# Patient Record
Sex: MALE | Race: WHITE | NOT HISPANIC OR LATINO | Employment: OTHER | URBAN - NONMETROPOLITAN AREA
[De-identification: names, ages, dates, MRNs, and addresses within clinical notes are randomized per-mention and may not be internally consistent; named-entity substitution may affect disease eponyms.]

---

## 2022-01-01 ENCOUNTER — APPOINTMENT (OUTPATIENT)
Dept: GENERAL RADIOLOGY | Facility: HOSPITAL | Age: 57
End: 2022-01-01

## 2022-01-01 ENCOUNTER — HOSPITAL ENCOUNTER (INPATIENT)
Facility: HOSPITAL | Age: 57
LOS: 11 days | End: 2022-01-16
Attending: STUDENT IN AN ORGANIZED HEALTH CARE EDUCATION/TRAINING PROGRAM | Admitting: HOSPITALIST

## 2022-01-01 ENCOUNTER — APPOINTMENT (OUTPATIENT)
Dept: CT IMAGING | Facility: HOSPITAL | Age: 57
End: 2022-01-01

## 2022-01-01 ENCOUNTER — APPOINTMENT (OUTPATIENT)
Dept: ULTRASOUND IMAGING | Facility: HOSPITAL | Age: 57
End: 2022-01-01

## 2022-01-01 ENCOUNTER — APPOINTMENT (OUTPATIENT)
Dept: CARDIOLOGY | Facility: HOSPITAL | Age: 57
End: 2022-01-01

## 2022-01-01 VITALS
SYSTOLIC BLOOD PRESSURE: 56 MMHG | WEIGHT: 175 LBS | HEIGHT: 75 IN | TEMPERATURE: 98.3 F | HEART RATE: 71 BPM | BODY MASS INDEX: 21.76 KG/M2 | RESPIRATION RATE: 26 BRPM | OXYGEN SATURATION: 54 % | DIASTOLIC BLOOD PRESSURE: 32 MMHG

## 2022-01-01 DIAGNOSIS — F10.931 ALCOHOL WITHDRAWAL DELIRIUM: ICD-10-CM

## 2022-01-01 DIAGNOSIS — S00.83XA CONTUSION OF FACE, INITIAL ENCOUNTER: Primary | ICD-10-CM

## 2022-01-01 LAB
A-A DO2: 113.3 MMHG (ref 0–300)
A-A DO2: 286.4 MMHG (ref 0–300)
A-A DO2: 32.5 MMHG (ref 0–300)
A-A DO2: 323.7 MMHG (ref 0–300)
A-A DO2: 39.6 MMHG (ref 0–300)
A-A DO2: 52.6 MMHG (ref 0–300)
A-A DO2: 61.1 MMHG (ref 0–300)
A-A DO2: 64.5 MMHG (ref 0–300)
A-A DO2: 69.9 MMHG (ref 0–300)
ALBUMIN SERPL-MCNC: 1.14 G/DL (ref 3.5–5.2)
ALBUMIN SERPL-MCNC: 2.4 G/DL (ref 3.5–5.2)
ALBUMIN SERPL-MCNC: 3.52 G/DL (ref 3.5–5.2)
ALBUMIN/GLOB SERPL: 0.3 G/DL
ALBUMIN/GLOB SERPL: 0.6 G/DL
ALBUMIN/GLOB SERPL: 0.7 G/DL
ALP SERPL-CCNC: 198 U/L (ref 39–117)
ALP SERPL-CCNC: 288 U/L (ref 39–117)
ALP SERPL-CCNC: 599 U/L (ref 39–117)
ALT SERPL W P-5'-P-CCNC: 102 U/L (ref 1–41)
ALT SERPL W P-5'-P-CCNC: 23 U/L (ref 1–41)
ALT SERPL W P-5'-P-CCNC: 66 U/L (ref 1–41)
AMMONIA BLD-SCNC: 28 UMOL/L (ref 16–60)
AMPHET+METHAMPHET UR QL: NEGATIVE
AMPHETAMINES UR QL: NEGATIVE
ANION GAP SERPL CALCULATED.3IONS-SCNC: 10.2 MMOL/L (ref 5–15)
ANION GAP SERPL CALCULATED.3IONS-SCNC: 11.1 MMOL/L (ref 5–15)
ANION GAP SERPL CALCULATED.3IONS-SCNC: 11.3 MMOL/L (ref 5–15)
ANION GAP SERPL CALCULATED.3IONS-SCNC: 11.3 MMOL/L (ref 5–15)
ANION GAP SERPL CALCULATED.3IONS-SCNC: 11.4 MMOL/L (ref 5–15)
ANION GAP SERPL CALCULATED.3IONS-SCNC: 11.6 MMOL/L (ref 5–15)
ANION GAP SERPL CALCULATED.3IONS-SCNC: 11.9 MMOL/L (ref 5–15)
ANION GAP SERPL CALCULATED.3IONS-SCNC: 12.1 MMOL/L (ref 5–15)
ANION GAP SERPL CALCULATED.3IONS-SCNC: 12.1 MMOL/L (ref 5–15)
ANION GAP SERPL CALCULATED.3IONS-SCNC: 12.4 MMOL/L (ref 5–15)
ANION GAP SERPL CALCULATED.3IONS-SCNC: 12.5 MMOL/L (ref 5–15)
ANION GAP SERPL CALCULATED.3IONS-SCNC: 12.6 MMOL/L (ref 5–15)
ANION GAP SERPL CALCULATED.3IONS-SCNC: 12.8 MMOL/L (ref 5–15)
ANION GAP SERPL CALCULATED.3IONS-SCNC: 12.9 MMOL/L (ref 5–15)
ANION GAP SERPL CALCULATED.3IONS-SCNC: 13.3 MMOL/L (ref 5–15)
ANION GAP SERPL CALCULATED.3IONS-SCNC: 13.3 MMOL/L (ref 5–15)
ANION GAP SERPL CALCULATED.3IONS-SCNC: 13.4 MMOL/L (ref 5–15)
ANION GAP SERPL CALCULATED.3IONS-SCNC: 13.6 MMOL/L (ref 5–15)
ANION GAP SERPL CALCULATED.3IONS-SCNC: 13.7 MMOL/L (ref 5–15)
ANION GAP SERPL CALCULATED.3IONS-SCNC: 13.7 MMOL/L (ref 5–15)
ANION GAP SERPL CALCULATED.3IONS-SCNC: 13.8 MMOL/L (ref 5–15)
ANION GAP SERPL CALCULATED.3IONS-SCNC: 14 MMOL/L (ref 5–15)
ANION GAP SERPL CALCULATED.3IONS-SCNC: 14.2 MMOL/L (ref 5–15)
ANION GAP SERPL CALCULATED.3IONS-SCNC: 14.6 MMOL/L (ref 5–15)
ANION GAP SERPL CALCULATED.3IONS-SCNC: 16.6 MMOL/L (ref 5–15)
ANION GAP SERPL CALCULATED.3IONS-SCNC: 22.7 MMOL/L (ref 5–15)
ANION GAP SERPL CALCULATED.3IONS-SCNC: 26.1 MMOL/L (ref 5–15)
ANION GAP SERPL CALCULATED.3IONS-SCNC: 28.7 MMOL/L (ref 5–15)
ANION GAP SERPL CALCULATED.3IONS-SCNC: 33 MMOL/L (ref 5–15)
ANISOCYTOSIS BLD QL: ABNORMAL
APAP SERPL-MCNC: <5 MCG/ML (ref 0–30)
APTT PPP: 28.5 SECONDS (ref 25.5–35.4)
ARTERIAL PATENCY WRIST A: ABNORMAL
ARTERIAL PATENCY WRIST A: POSITIVE
ARTERIAL PATENCY WRIST A: POSITIVE
AST SERPL-CCNC: 147 U/L (ref 1–40)
AST SERPL-CCNC: 48 U/L (ref 1–40)
AST SERPL-CCNC: 85 U/L (ref 1–40)
ATMOSPHERIC PRESS: 725 MMHG
ATMOSPHERIC PRESS: 725 MMHG
ATMOSPHERIC PRESS: 726 MMHG
ATMOSPHERIC PRESS: 728 MMHG
ATMOSPHERIC PRESS: 731 MMHG
ATMOSPHERIC PRESS: 733 MMHG
ATMOSPHERIC PRESS: 734 MMHG
ATMOSPHERIC PRESS: 737 MMHG
ATMOSPHERIC PRESS: 738 MMHG
BACTERIA BLD CULT: ABNORMAL
BACTERIA BLD CULT: ABNORMAL
BACTERIA ID TEST ISLT QL CULT: ABNORMAL
BACTERIA ID TEST ISLT QL CULT: ABNORMAL
BACTERIA SPEC AEROBE CULT: ABNORMAL
BACTERIA SPEC AEROBE CULT: NORMAL
BACTERIA SPEC AEROBE CULT: NORMAL
BACTERIA SPEC RESP CULT: NO GROWTH
BACTERIA UR QL AUTO: ABNORMAL /HPF
BACTERIA UR QL AUTO: ABNORMAL /HPF
BARBITURATES UR QL SCN: NEGATIVE
BASE EXCESS BLDA CALC-SCNC: -0.2 MMOL/L (ref 0–2)
BASE EXCESS BLDA CALC-SCNC: -2.9 MMOL/L (ref 0–2)
BASE EXCESS BLDA CALC-SCNC: -5.9 MMOL/L (ref 0–2)
BASE EXCESS BLDA CALC-SCNC: -6.4 MMOL/L (ref 0–2)
BASE EXCESS BLDA CALC-SCNC: -6.9 MMOL/L (ref 0–2)
BASE EXCESS BLDA CALC-SCNC: -7 MMOL/L (ref 0–2)
BASE EXCESS BLDA CALC-SCNC: -7.2 MMOL/L (ref 0–2)
BASE EXCESS BLDA CALC-SCNC: -8.1 MMOL/L (ref 0–2)
BASE EXCESS BLDA CALC-SCNC: -9.9 MMOL/L (ref 0–2)
BASOPHILS # BLD AUTO: 0.03 10*3/MM3 (ref 0–0.2)
BASOPHILS # BLD AUTO: 0.03 10*3/MM3 (ref 0–0.2)
BASOPHILS # BLD AUTO: 0.05 10*3/MM3 (ref 0–0.2)
BASOPHILS # BLD AUTO: 0.08 10*3/MM3 (ref 0–0.2)
BASOPHILS # BLD AUTO: 0.08 10*3/MM3 (ref 0–0.2)
BASOPHILS # BLD AUTO: 0.1 10*3/MM3 (ref 0–0.2)
BASOPHILS # BLD MANUAL: 0.11 10*3/MM3 (ref 0–0.2)
BASOPHILS # BLD MANUAL: 0.11 10*3/MM3 (ref 0–0.2)
BASOPHILS NFR BLD AUTO: 0.3 % (ref 0–1.5)
BASOPHILS NFR BLD AUTO: 0.3 % (ref 0–1.5)
BASOPHILS NFR BLD AUTO: 0.6 % (ref 0–1.5)
BASOPHILS NFR BLD AUTO: 0.6 % (ref 0–1.5)
BASOPHILS NFR BLD AUTO: 0.7 % (ref 0–1.5)
BASOPHILS NFR BLD AUTO: 0.8 % (ref 0–1.5)
BASOPHILS NFR BLD MANUAL: 1 % (ref 0–1.5)
BASOPHILS NFR BLD MANUAL: 1 % (ref 0–1.5)
BDY SITE: ABNORMAL
BENZODIAZ UR QL SCN: NEGATIVE
BH CV ECHO MEAS - % IVS THICK: 1.3 %
BH CV ECHO MEAS - % LVPW THICK: 6.2 %
BH CV ECHO MEAS - ACS: 1.9 CM
BH CV ECHO MEAS - AO MAX PG: 7.7 MMHG
BH CV ECHO MEAS - AO MEAN PG: 4 MMHG
BH CV ECHO MEAS - AO ROOT AREA (BSA CORRECTED): 1.6
BH CV ECHO MEAS - AO ROOT AREA: 8.6 CM^2
BH CV ECHO MEAS - AO ROOT DIAM: 3.3 CM
BH CV ECHO MEAS - AO V2 MAX: 139 CM/SEC
BH CV ECHO MEAS - AO V2 MEAN: 91.7 CM/SEC
BH CV ECHO MEAS - AO V2 VTI: 28.6 CM
BH CV ECHO MEAS - BSA(HAYCOCK): 2 M^2
BH CV ECHO MEAS - BSA: 2.1 M^2
BH CV ECHO MEAS - BZI_BMI: 21.4 KILOGRAMS/M^2
BH CV ECHO MEAS - BZI_METRIC_HEIGHT: 190.5 CM
BH CV ECHO MEAS - BZI_METRIC_WEIGHT: 77.6 KG
BH CV ECHO MEAS - EDV(CUBED): 76.8 ML
BH CV ECHO MEAS - EDV(MOD-SP4): 66.8 ML
BH CV ECHO MEAS - EDV(TEICH): 80.8 ML
BH CV ECHO MEAS - EF(CUBED): 76.8 %
BH CV ECHO MEAS - EF(MOD-SP4): 64.4 %
BH CV ECHO MEAS - EF(TEICH): 69.3 %
BH CV ECHO MEAS - ESV(CUBED): 17.8 ML
BH CV ECHO MEAS - ESV(MOD-SP4): 23.8 ML
BH CV ECHO MEAS - ESV(TEICH): 24.8 ML
BH CV ECHO MEAS - FS: 38.6 %
BH CV ECHO MEAS - IVS/LVPW: 1
BH CV ECHO MEAS - IVSD: 1.1 CM
BH CV ECHO MEAS - IVSS: 1.2 CM
BH CV ECHO MEAS - LA DIMENSION: 3.5 CM
BH CV ECHO MEAS - LA/AO: 1
BH CV ECHO MEAS - LV DIASTOLIC VOL/BSA (35-75): 32.5 ML/M^2
BH CV ECHO MEAS - LV MASS(C)D: 165.7 GRAMS
BH CV ECHO MEAS - LV MASS(C)DI: 80.7 GRAMS/M^2
BH CV ECHO MEAS - LV MASS(C)S: 86.7 GRAMS
BH CV ECHO MEAS - LV MASS(C)SI: 42.2 GRAMS/M^2
BH CV ECHO MEAS - LV SYSTOLIC VOL/BSA (12-30): 11.6 ML/M^2
BH CV ECHO MEAS - LVIDD: 4.3 CM
BH CV ECHO MEAS - LVIDS: 2.6 CM
BH CV ECHO MEAS - LVLD AP4: 6.2 CM
BH CV ECHO MEAS - LVLS AP4: 5 CM
BH CV ECHO MEAS - LVOT AREA (M): 3.1 CM^2
BH CV ECHO MEAS - LVOT AREA: 3.1 CM^2
BH CV ECHO MEAS - LVOT DIAM: 2 CM
BH CV ECHO MEAS - LVPWD: 1.1 CM
BH CV ECHO MEAS - LVPWS: 1.2 CM
BH CV ECHO MEAS - MV A MAX VEL: 121 CM/SEC
BH CV ECHO MEAS - MV E MAX VEL: 82 CM/SEC
BH CV ECHO MEAS - MV E/A: 0.68
BH CV ECHO MEAS - PA ACC TIME: 0.14 SEC
BH CV ECHO MEAS - PA PR(ACCEL): 17.4 MMHG
BH CV ECHO MEAS - RAP SYSTOLE: 10 MMHG
BH CV ECHO MEAS - RVSP: 42.5 MMHG
BH CV ECHO MEAS - SI(AO): 119.1 ML/M^2
BH CV ECHO MEAS - SI(CUBED): 28.7 ML/M^2
BH CV ECHO MEAS - SI(MOD-SP4): 20.9 ML/M^2
BH CV ECHO MEAS - SI(TEICH): 27.3 ML/M^2
BH CV ECHO MEAS - SV(AO): 244.6 ML
BH CV ECHO MEAS - SV(CUBED): 59 ML
BH CV ECHO MEAS - SV(MOD-SP4): 43 ML
BH CV ECHO MEAS - SV(TEICH): 56 ML
BH CV ECHO MEAS - TR MAX VEL: 285 CM/SEC
BILIRUB SERPL-MCNC: 0.3 MG/DL (ref 0–1.2)
BILIRUB SERPL-MCNC: 0.5 MG/DL (ref 0–1.2)
BILIRUB SERPL-MCNC: 0.8 MG/DL (ref 0–1.2)
BILIRUB UR QL STRIP: NEGATIVE
BILIRUB UR QL STRIP: NEGATIVE
BLOOD CULTURE ID, PCR 3: ABNORMAL
BLOOD CULTURE ID, PCR 4: ABNORMAL
BODY TEMPERATURE: 0 C
BOTTLE TYPE: ABNORMAL
BUN SERPL-MCNC: 105 MG/DL (ref 6–20)
BUN SERPL-MCNC: 119 MG/DL (ref 6–20)
BUN SERPL-MCNC: 22 MG/DL (ref 6–20)
BUN SERPL-MCNC: 25 MG/DL (ref 6–20)
BUN SERPL-MCNC: 27 MG/DL (ref 6–20)
BUN SERPL-MCNC: 29 MG/DL (ref 6–20)
BUN SERPL-MCNC: 30 MG/DL (ref 6–20)
BUN SERPL-MCNC: 33 MG/DL (ref 6–20)
BUN SERPL-MCNC: 34 MG/DL (ref 6–20)
BUN SERPL-MCNC: 37 MG/DL (ref 6–20)
BUN SERPL-MCNC: 40 MG/DL (ref 6–20)
BUN SERPL-MCNC: 42 MG/DL (ref 6–20)
BUN SERPL-MCNC: 47 MG/DL (ref 6–20)
BUN SERPL-MCNC: 50 MG/DL (ref 6–20)
BUN SERPL-MCNC: 54 MG/DL (ref 6–20)
BUN SERPL-MCNC: 55 MG/DL (ref 6–20)
BUN SERPL-MCNC: 59 MG/DL (ref 6–20)
BUN SERPL-MCNC: 65 MG/DL (ref 6–20)
BUN SERPL-MCNC: 67 MG/DL (ref 6–20)
BUN SERPL-MCNC: 70 MG/DL (ref 6–20)
BUN SERPL-MCNC: 73 MG/DL (ref 6–20)
BUN SERPL-MCNC: 78 MG/DL (ref 6–20)
BUN SERPL-MCNC: 80 MG/DL (ref 6–20)
BUN SERPL-MCNC: 83 MG/DL (ref 6–20)
BUN SERPL-MCNC: 83 MG/DL (ref 6–20)
BUN SERPL-MCNC: 89 MG/DL (ref 6–20)
BUN SERPL-MCNC: 91 MG/DL (ref 6–20)
BUN/CREAT SERPL: 10.1 (ref 7–25)
BUN/CREAT SERPL: 10.2 (ref 7–25)
BUN/CREAT SERPL: 10.3 (ref 7–25)
BUN/CREAT SERPL: 10.5 (ref 7–25)
BUN/CREAT SERPL: 10.5 (ref 7–25)
BUN/CREAT SERPL: 10.7 (ref 7–25)
BUN/CREAT SERPL: 11 (ref 7–25)
BUN/CREAT SERPL: 11.5 (ref 7–25)
BUN/CREAT SERPL: 11.6 (ref 7–25)
BUN/CREAT SERPL: 11.8 (ref 7–25)
BUN/CREAT SERPL: 11.8 (ref 7–25)
BUN/CREAT SERPL: 11.9 (ref 7–25)
BUN/CREAT SERPL: 12 (ref 7–25)
BUN/CREAT SERPL: 12.3 (ref 7–25)
BUN/CREAT SERPL: 12.6 (ref 7–25)
BUN/CREAT SERPL: 12.7 (ref 7–25)
BUN/CREAT SERPL: 12.8 (ref 7–25)
BUN/CREAT SERPL: 13.6 (ref 7–25)
BUN/CREAT SERPL: 14.1 (ref 7–25)
BUN/CREAT SERPL: 15.2 (ref 7–25)
BUN/CREAT SERPL: 18 (ref 7–25)
BUN/CREAT SERPL: 20 (ref 7–25)
BUN/CREAT SERPL: 20.9 (ref 7–25)
BUN/CREAT SERPL: 23.7 (ref 7–25)
BUN/CREAT SERPL: 24.1 (ref 7–25)
BUPRENORPHINE SERPL-MCNC: NEGATIVE NG/ML
CALCIUM SPEC-SCNC: 6.8 MG/DL (ref 8.6–10.5)
CALCIUM SPEC-SCNC: 6.9 MG/DL (ref 8.6–10.5)
CALCIUM SPEC-SCNC: 7 MG/DL (ref 8.6–10.5)
CALCIUM SPEC-SCNC: 7.1 MG/DL (ref 8.6–10.5)
CALCIUM SPEC-SCNC: 7.2 MG/DL (ref 8.6–10.5)
CALCIUM SPEC-SCNC: 7.2 MG/DL (ref 8.6–10.5)
CALCIUM SPEC-SCNC: 7.3 MG/DL (ref 8.6–10.5)
CALCIUM SPEC-SCNC: 7.4 MG/DL (ref 8.6–10.5)
CALCIUM SPEC-SCNC: 7.6 MG/DL (ref 8.6–10.5)
CALCIUM SPEC-SCNC: 7.7 MG/DL (ref 8.6–10.5)
CALCIUM SPEC-SCNC: 7.8 MG/DL (ref 8.6–10.5)
CALCIUM SPEC-SCNC: 7.9 MG/DL (ref 8.6–10.5)
CALCIUM SPEC-SCNC: 8 MG/DL (ref 8.6–10.5)
CALCIUM SPEC-SCNC: 8.4 MG/DL (ref 8.6–10.5)
CALCIUM SPEC-SCNC: 8.6 MG/DL (ref 8.6–10.5)
CALCIUM SPEC-SCNC: 8.6 MG/DL (ref 8.6–10.5)
CALCIUM SPEC-SCNC: 9.8 MG/DL (ref 8.6–10.5)
CANNABINOIDS SERPL QL: NEGATIVE
CHLORIDE SERPL-SCNC: 107 MMOL/L (ref 98–107)
CHLORIDE SERPL-SCNC: 112 MMOL/L (ref 98–107)
CHLORIDE SERPL-SCNC: 112 MMOL/L (ref 98–107)
CHLORIDE SERPL-SCNC: 113 MMOL/L (ref 98–107)
CHLORIDE SERPL-SCNC: 114 MMOL/L (ref 98–107)
CHLORIDE SERPL-SCNC: 114 MMOL/L (ref 98–107)
CHLORIDE SERPL-SCNC: 115 MMOL/L (ref 98–107)
CHLORIDE SERPL-SCNC: 115 MMOL/L (ref 98–107)
CHLORIDE SERPL-SCNC: 116 MMOL/L (ref 98–107)
CHLORIDE SERPL-SCNC: 118 MMOL/L (ref 98–107)
CHLORIDE SERPL-SCNC: 119 MMOL/L (ref 98–107)
CHLORIDE SERPL-SCNC: 122 MMOL/L (ref 98–107)
CHLORIDE SERPL-SCNC: 123 MMOL/L (ref 98–107)
CHLORIDE SERPL-SCNC: 123 MMOL/L (ref 98–107)
CHLORIDE SERPL-SCNC: 124 MMOL/L (ref 98–107)
CHLORIDE SERPL-SCNC: 124 MMOL/L (ref 98–107)
CHLORIDE SERPL-SCNC: 125 MMOL/L (ref 98–107)
CHLORIDE SERPL-SCNC: 128 MMOL/L (ref 98–107)
CHLORIDE SERPL-SCNC: 129 MMOL/L (ref 98–107)
CHLORIDE SERPL-SCNC: 130 MMOL/L (ref 98–107)
CHLORIDE SERPL-SCNC: 131 MMOL/L (ref 98–107)
CHLORIDE SERPL-SCNC: 85 MMOL/L (ref 98–107)
CHLORIDE SERPL-SCNC: 96 MMOL/L (ref 98–107)
CK SERPL-CCNC: 1269 U/L (ref 20–200)
CK SERPL-CCNC: 131 U/L (ref 20–200)
CK SERPL-CCNC: 548 U/L (ref 20–200)
CK SERPL-CCNC: 994 U/L (ref 20–200)
CLARITY UR: ABNORMAL
CLARITY UR: CLEAR
CO2 BLDA-SCNC: 15.3 MMOL/L (ref 22–33)
CO2 BLDA-SCNC: 17.5 MMOL/L (ref 22–33)
CO2 BLDA-SCNC: 17.9 MMOL/L (ref 22–33)
CO2 BLDA-SCNC: 18.7 MMOL/L (ref 22–33)
CO2 BLDA-SCNC: 19.2 MMOL/L (ref 22–33)
CO2 BLDA-SCNC: 19.2 MMOL/L (ref 22–33)
CO2 BLDA-SCNC: 19.8 MMOL/L (ref 22–33)
CO2 BLDA-SCNC: 23.3 MMOL/L (ref 22–33)
CO2 BLDA-SCNC: 23.5 MMOL/L (ref 22–33)
CO2 SERPL-SCNC: 12.3 MMOL/L (ref 22–29)
CO2 SERPL-SCNC: 12.3 MMOL/L (ref 22–29)
CO2 SERPL-SCNC: 12.9 MMOL/L (ref 22–29)
CO2 SERPL-SCNC: 13.4 MMOL/L (ref 22–29)
CO2 SERPL-SCNC: 14.3 MMOL/L (ref 22–29)
CO2 SERPL-SCNC: 14.4 MMOL/L (ref 22–29)
CO2 SERPL-SCNC: 14.8 MMOL/L (ref 22–29)
CO2 SERPL-SCNC: 15.5 MMOL/L (ref 22–29)
CO2 SERPL-SCNC: 15.6 MMOL/L (ref 22–29)
CO2 SERPL-SCNC: 15.7 MMOL/L (ref 22–29)
CO2 SERPL-SCNC: 15.7 MMOL/L (ref 22–29)
CO2 SERPL-SCNC: 16 MMOL/L (ref 22–29)
CO2 SERPL-SCNC: 16.1 MMOL/L (ref 22–29)
CO2 SERPL-SCNC: 16.2 MMOL/L (ref 22–29)
CO2 SERPL-SCNC: 16.3 MMOL/L (ref 22–29)
CO2 SERPL-SCNC: 16.4 MMOL/L (ref 22–29)
CO2 SERPL-SCNC: 16.6 MMOL/L (ref 22–29)
CO2 SERPL-SCNC: 16.7 MMOL/L (ref 22–29)
CO2 SERPL-SCNC: 17.4 MMOL/L (ref 22–29)
CO2 SERPL-SCNC: 17.6 MMOL/L (ref 22–29)
CO2 SERPL-SCNC: 17.8 MMOL/L (ref 22–29)
CO2 SERPL-SCNC: 18 MMOL/L (ref 22–29)
CO2 SERPL-SCNC: 18.9 MMOL/L (ref 22–29)
CO2 SERPL-SCNC: 20.1 MMOL/L (ref 22–29)
CO2 SERPL-SCNC: 20.2 MMOL/L (ref 22–29)
CO2 SERPL-SCNC: 20.7 MMOL/L (ref 22–29)
CO2 SERPL-SCNC: 20.9 MMOL/L (ref 22–29)
CO2 SERPL-SCNC: 21.4 MMOL/L (ref 22–29)
CO2 SERPL-SCNC: 22.9 MMOL/L (ref 22–29)
COCAINE UR QL: NEGATIVE
COHGB MFR BLD: 0.9 % (ref 0–5)
COHGB MFR BLD: 1 % (ref 0–5)
COHGB MFR BLD: 1 % (ref 0–5)
COHGB MFR BLD: 1.1 % (ref 0–5)
COHGB MFR BLD: 1.1 % (ref 0–5)
COHGB MFR BLD: 1.2 % (ref 0–5)
COHGB MFR BLD: 1.3 % (ref 0–5)
COLOR UR: YELLOW
COLOR UR: YELLOW
CREAT SERPL-MCNC: 1.14 MG/DL (ref 0.76–1.27)
CREAT SERPL-MCNC: 1.22 MG/DL (ref 0.76–1.27)
CREAT SERPL-MCNC: 1.35 MG/DL (ref 0.76–1.27)
CREAT SERPL-MCNC: 1.37 MG/DL (ref 0.76–1.27)
CREAT SERPL-MCNC: 1.39 MG/DL (ref 0.76–1.27)
CREAT SERPL-MCNC: 1.64 MG/DL (ref 0.76–1.27)
CREAT SERPL-MCNC: 1.92 MG/DL (ref 0.76–1.27)
CREAT SERPL-MCNC: 2.2 MG/DL (ref 0.76–1.27)
CREAT SERPL-MCNC: 2.69 MG/DL (ref 0.76–1.27)
CREAT SERPL-MCNC: 3.01 MG/DL (ref 0.76–1.27)
CREAT SERPL-MCNC: 3.62 MG/DL (ref 0.76–1.27)
CREAT SERPL-MCNC: 4.01 MG/DL (ref 0.76–1.27)
CREAT SERPL-MCNC: 4.41 MG/DL (ref 0.76–1.27)
CREAT SERPL-MCNC: 4.96 MG/DL (ref 0.76–1.27)
CREAT SERPL-MCNC: 5.22 MG/DL (ref 0.76–1.27)
CREAT SERPL-MCNC: 5.37 MG/DL (ref 0.76–1.27)
CREAT SERPL-MCNC: 5.64 MG/DL (ref 0.76–1.27)
CREAT SERPL-MCNC: 5.92 MG/DL (ref 0.76–1.27)
CREAT SERPL-MCNC: 5.92 MG/DL (ref 0.76–1.27)
CREAT SERPL-MCNC: 6.09 MG/DL (ref 0.76–1.27)
CREAT SERPL-MCNC: 6.36 MG/DL (ref 0.76–1.27)
CREAT SERPL-MCNC: 6.7 MG/DL (ref 0.76–1.27)
CREAT SERPL-MCNC: 6.8 MG/DL (ref 0.76–1.27)
CREAT SERPL-MCNC: 7.05 MG/DL (ref 0.76–1.27)
CREAT SERPL-MCNC: 7.23 MG/DL (ref 0.76–1.27)
CREAT SERPL-MCNC: 7.56 MG/DL (ref 0.76–1.27)
CREAT SERPL-MCNC: 7.7 MG/DL (ref 0.76–1.27)
CREAT SERPL-MCNC: 8.3 MG/DL (ref 0.76–1.27)
CREAT SERPL-MCNC: 9.33 MG/DL (ref 0.76–1.27)
CRP SERPL-MCNC: 25.26 MG/DL (ref 0–0.5)
CRP SERPL-MCNC: 44.76 MG/DL (ref 0–0.5)
CRP SERPL-MCNC: 5.3 MG/DL (ref 0–0.5)
CRP SERPL-MCNC: 54.03 MG/DL (ref 0–0.5)
CRP SERPL-MCNC: 55.78 MG/DL (ref 0–0.5)
D-LACTATE SERPL-SCNC: 1.7 MMOL/L (ref 0.5–2)
D-LACTATE SERPL-SCNC: 2.2 MMOL/L (ref 0.5–2)
D-LACTATE SERPL-SCNC: 2.8 MMOL/L (ref 0.5–2)
DEPRECATED RDW RBC AUTO: 47.7 FL (ref 37–54)
DEPRECATED RDW RBC AUTO: 48.4 FL (ref 37–54)
DEPRECATED RDW RBC AUTO: 49.2 FL (ref 37–54)
DEPRECATED RDW RBC AUTO: 51.3 FL (ref 37–54)
DEPRECATED RDW RBC AUTO: 52.1 FL (ref 37–54)
DEPRECATED RDW RBC AUTO: 53.2 FL (ref 37–54)
DEPRECATED RDW RBC AUTO: 55.2 FL (ref 37–54)
DEPRECATED RDW RBC AUTO: 55.3 FL (ref 37–54)
DEPRECATED RDW RBC AUTO: 57.1 FL (ref 37–54)
DEPRECATED RDW RBC AUTO: 58.3 FL (ref 37–54)
DEPRECATED RDW RBC AUTO: 58.9 FL (ref 37–54)
DEPRECATED RDW RBC AUTO: 61.1 FL (ref 37–54)
DEPRECATED RDW RBC AUTO: 62.1 FL (ref 37–54)
DOHLE BODIES: PRESENT
DOHLE BODIES: PRESENT
EOSINOPHIL # BLD AUTO: 0 10*3/MM3 (ref 0–0.4)
EOSINOPHIL # BLD AUTO: 0 10*3/MM3 (ref 0–0.4)
EOSINOPHIL # BLD AUTO: 0.1 10*3/MM3 (ref 0–0.4)
EOSINOPHIL # BLD AUTO: 0.11 10*3/MM3 (ref 0–0.4)
EOSINOPHIL # BLD AUTO: 0.12 10*3/MM3 (ref 0–0.4)
EOSINOPHIL # BLD AUTO: 0.17 10*3/MM3 (ref 0–0.4)
EOSINOPHIL # BLD MANUAL: 0.13 10*3/MM3 (ref 0–0.4)
EOSINOPHIL # BLD MANUAL: 0.17 10*3/MM3 (ref 0–0.4)
EOSINOPHIL # BLD MANUAL: 0.3 10*3/MM3 (ref 0–0.4)
EOSINOPHIL # BLD MANUAL: 0.32 10*3/MM3 (ref 0–0.4)
EOSINOPHIL # BLD MANUAL: 0.34 10*3/MM3 (ref 0–0.4)
EOSINOPHIL # BLD MANUAL: 0.37 10*3/MM3 (ref 0–0.4)
EOSINOPHIL NFR BLD AUTO: 0 % (ref 0.3–6.2)
EOSINOPHIL NFR BLD AUTO: 0 % (ref 0.3–6.2)
EOSINOPHIL NFR BLD AUTO: 0.9 % (ref 0.3–6.2)
EOSINOPHIL NFR BLD AUTO: 0.9 % (ref 0.3–6.2)
EOSINOPHIL NFR BLD AUTO: 1.3 % (ref 0.3–6.2)
EOSINOPHIL NFR BLD AUTO: 1.3 % (ref 0.3–6.2)
EOSINOPHIL NFR BLD MANUAL: 2 % (ref 0.3–6.2)
EOSINOPHIL NFR BLD MANUAL: 2 % (ref 0.3–6.2)
EOSINOPHIL NFR BLD MANUAL: 3 % (ref 0.3–6.2)
EOSINOPHIL NFR BLD MANUAL: 4 % (ref 0.3–6.2)
ERYTHROCYTE [DISTWIDTH] IN BLOOD BY AUTOMATED COUNT: 13.4 % (ref 12.3–15.4)
ERYTHROCYTE [DISTWIDTH] IN BLOOD BY AUTOMATED COUNT: 13.6 % (ref 12.3–15.4)
ERYTHROCYTE [DISTWIDTH] IN BLOOD BY AUTOMATED COUNT: 13.8 % (ref 12.3–15.4)
ERYTHROCYTE [DISTWIDTH] IN BLOOD BY AUTOMATED COUNT: 14.2 % (ref 12.3–15.4)
ERYTHROCYTE [DISTWIDTH] IN BLOOD BY AUTOMATED COUNT: 14.6 % (ref 12.3–15.4)
ERYTHROCYTE [DISTWIDTH] IN BLOOD BY AUTOMATED COUNT: 14.8 % (ref 12.3–15.4)
ERYTHROCYTE [DISTWIDTH] IN BLOOD BY AUTOMATED COUNT: 15.4 % (ref 12.3–15.4)
ERYTHROCYTE [DISTWIDTH] IN BLOOD BY AUTOMATED COUNT: 15.7 % (ref 12.3–15.4)
ERYTHROCYTE [DISTWIDTH] IN BLOOD BY AUTOMATED COUNT: 15.7 % (ref 12.3–15.4)
ERYTHROCYTE [DISTWIDTH] IN BLOOD BY AUTOMATED COUNT: 15.9 % (ref 12.3–15.4)
ERYTHROCYTE [DISTWIDTH] IN BLOOD BY AUTOMATED COUNT: 15.9 % (ref 12.3–15.4)
ERYTHROCYTE [DISTWIDTH] IN BLOOD BY AUTOMATED COUNT: 16.1 % (ref 12.3–15.4)
ERYTHROCYTE [DISTWIDTH] IN BLOOD BY AUTOMATED COUNT: 16.5 % (ref 12.3–15.4)
ETHANOL BLD-MCNC: <10 MG/DL (ref 0–10)
ETHANOL UR QL: <0.01 %
FLUAV SUBTYP SPEC NAA+PROBE: NOT DETECTED
FLUBV RNA ISLT QL NAA+PROBE: NOT DETECTED
GFR SERPL CREATININE-BSD FRML MDRD: 10 ML/MIN/1.73
GFR SERPL CREATININE-BSD FRML MDRD: 11 ML/MIN/1.73
GFR SERPL CREATININE-BSD FRML MDRD: 11 ML/MIN/1.73
GFR SERPL CREATININE-BSD FRML MDRD: 12 ML/MIN/1.73
GFR SERPL CREATININE-BSD FRML MDRD: 14 ML/MIN/1.73
GFR SERPL CREATININE-BSD FRML MDRD: 15 ML/MIN/1.73
GFR SERPL CREATININE-BSD FRML MDRD: 17 ML/MIN/1.73
GFR SERPL CREATININE-BSD FRML MDRD: 22 ML/MIN/1.73
GFR SERPL CREATININE-BSD FRML MDRD: 24 ML/MIN/1.73
GFR SERPL CREATININE-BSD FRML MDRD: 31 ML/MIN/1.73
GFR SERPL CREATININE-BSD FRML MDRD: 36 ML/MIN/1.73
GFR SERPL CREATININE-BSD FRML MDRD: 43 ML/MIN/1.73
GFR SERPL CREATININE-BSD FRML MDRD: 52 ML/MIN/1.73
GFR SERPL CREATININE-BSD FRML MDRD: 53 ML/MIN/1.73
GFR SERPL CREATININE-BSD FRML MDRD: 54 ML/MIN/1.73
GFR SERPL CREATININE-BSD FRML MDRD: 6 ML/MIN/1.73
GFR SERPL CREATININE-BSD FRML MDRD: 61 ML/MIN/1.73
GFR SERPL CREATININE-BSD FRML MDRD: 66 ML/MIN/1.73
GFR SERPL CREATININE-BSD FRML MDRD: 7 ML/MIN/1.73
GFR SERPL CREATININE-BSD FRML MDRD: 8 ML/MIN/1.73
GFR SERPL CREATININE-BSD FRML MDRD: 9 ML/MIN/1.73
GFR SERPL CREATININE-BSD FRML MDRD: 9 ML/MIN/1.73
GFR SERPL CREATININE-BSD FRML MDRD: ABNORMAL ML/MIN/{1.73_M2}
GIANT PLATELETS: ABNORMAL
GLOBULIN UR ELPH-MCNC: 4.3 GM/DL
GLOBULIN UR ELPH-MCNC: 4.5 GM/DL
GLOBULIN UR ELPH-MCNC: 4.9 GM/DL
GLUCOSE BLDC GLUCOMTR-MCNC: 101 MG/DL (ref 70–130)
GLUCOSE BLDC GLUCOMTR-MCNC: 103 MG/DL (ref 70–130)
GLUCOSE BLDC GLUCOMTR-MCNC: 121 MG/DL (ref 70–130)
GLUCOSE BLDC GLUCOMTR-MCNC: 126 MG/DL (ref 70–130)
GLUCOSE BLDC GLUCOMTR-MCNC: 127 MG/DL (ref 70–130)
GLUCOSE BLDC GLUCOMTR-MCNC: 127 MG/DL (ref 70–130)
GLUCOSE BLDC GLUCOMTR-MCNC: 133 MG/DL (ref 70–130)
GLUCOSE BLDC GLUCOMTR-MCNC: 144 MG/DL (ref 70–130)
GLUCOSE BLDC GLUCOMTR-MCNC: 145 MG/DL (ref 70–130)
GLUCOSE BLDC GLUCOMTR-MCNC: 147 MG/DL (ref 70–130)
GLUCOSE BLDC GLUCOMTR-MCNC: 149 MG/DL (ref 70–130)
GLUCOSE BLDC GLUCOMTR-MCNC: 154 MG/DL (ref 70–130)
GLUCOSE BLDC GLUCOMTR-MCNC: 156 MG/DL (ref 70–130)
GLUCOSE BLDC GLUCOMTR-MCNC: 159 MG/DL (ref 70–130)
GLUCOSE BLDC GLUCOMTR-MCNC: 164 MG/DL (ref 70–130)
GLUCOSE BLDC GLUCOMTR-MCNC: 165 MG/DL (ref 70–130)
GLUCOSE BLDC GLUCOMTR-MCNC: 174 MG/DL (ref 70–130)
GLUCOSE BLDC GLUCOMTR-MCNC: 184 MG/DL (ref 70–130)
GLUCOSE BLDC GLUCOMTR-MCNC: 185 MG/DL (ref 70–130)
GLUCOSE BLDC GLUCOMTR-MCNC: 190 MG/DL (ref 70–130)
GLUCOSE BLDC GLUCOMTR-MCNC: 194 MG/DL (ref 70–130)
GLUCOSE BLDC GLUCOMTR-MCNC: 195 MG/DL (ref 70–130)
GLUCOSE BLDC GLUCOMTR-MCNC: 197 MG/DL (ref 70–130)
GLUCOSE BLDC GLUCOMTR-MCNC: 197 MG/DL (ref 70–130)
GLUCOSE BLDC GLUCOMTR-MCNC: 198 MG/DL (ref 70–130)
GLUCOSE BLDC GLUCOMTR-MCNC: 200 MG/DL (ref 70–130)
GLUCOSE BLDC GLUCOMTR-MCNC: 202 MG/DL (ref 70–130)
GLUCOSE BLDC GLUCOMTR-MCNC: 208 MG/DL (ref 70–130)
GLUCOSE BLDC GLUCOMTR-MCNC: 211 MG/DL (ref 70–130)
GLUCOSE BLDC GLUCOMTR-MCNC: 211 MG/DL (ref 70–130)
GLUCOSE BLDC GLUCOMTR-MCNC: 213 MG/DL (ref 70–130)
GLUCOSE BLDC GLUCOMTR-MCNC: 217 MG/DL (ref 70–130)
GLUCOSE BLDC GLUCOMTR-MCNC: 220 MG/DL (ref 70–130)
GLUCOSE BLDC GLUCOMTR-MCNC: 225 MG/DL (ref 70–130)
GLUCOSE BLDC GLUCOMTR-MCNC: 230 MG/DL (ref 70–130)
GLUCOSE BLDC GLUCOMTR-MCNC: 231 MG/DL (ref 70–130)
GLUCOSE BLDC GLUCOMTR-MCNC: 232 MG/DL (ref 70–130)
GLUCOSE BLDC GLUCOMTR-MCNC: 233 MG/DL (ref 70–130)
GLUCOSE BLDC GLUCOMTR-MCNC: 234 MG/DL (ref 70–130)
GLUCOSE BLDC GLUCOMTR-MCNC: 242 MG/DL (ref 70–130)
GLUCOSE BLDC GLUCOMTR-MCNC: 246 MG/DL (ref 70–130)
GLUCOSE BLDC GLUCOMTR-MCNC: 248 MG/DL (ref 70–130)
GLUCOSE BLDC GLUCOMTR-MCNC: 256 MG/DL (ref 70–130)
GLUCOSE BLDC GLUCOMTR-MCNC: 268 MG/DL (ref 70–130)
GLUCOSE BLDC GLUCOMTR-MCNC: 268 MG/DL (ref 70–130)
GLUCOSE BLDC GLUCOMTR-MCNC: 310 MG/DL (ref 70–130)
GLUCOSE BLDC GLUCOMTR-MCNC: 334 MG/DL (ref 70–130)
GLUCOSE SERPL-MCNC: 107 MG/DL (ref 65–99)
GLUCOSE SERPL-MCNC: 109 MG/DL (ref 65–99)
GLUCOSE SERPL-MCNC: 118 MG/DL (ref 65–99)
GLUCOSE SERPL-MCNC: 138 MG/DL (ref 65–99)
GLUCOSE SERPL-MCNC: 138 MG/DL (ref 65–99)
GLUCOSE SERPL-MCNC: 139 MG/DL (ref 65–99)
GLUCOSE SERPL-MCNC: 143 MG/DL (ref 65–99)
GLUCOSE SERPL-MCNC: 148 MG/DL (ref 65–99)
GLUCOSE SERPL-MCNC: 149 MG/DL (ref 65–99)
GLUCOSE SERPL-MCNC: 153 MG/DL (ref 65–99)
GLUCOSE SERPL-MCNC: 163 MG/DL (ref 65–99)
GLUCOSE SERPL-MCNC: 164 MG/DL (ref 65–99)
GLUCOSE SERPL-MCNC: 169 MG/DL (ref 65–99)
GLUCOSE SERPL-MCNC: 193 MG/DL (ref 65–99)
GLUCOSE SERPL-MCNC: 197 MG/DL (ref 65–99)
GLUCOSE SERPL-MCNC: 200 MG/DL (ref 65–99)
GLUCOSE SERPL-MCNC: 202 MG/DL (ref 65–99)
GLUCOSE SERPL-MCNC: 210 MG/DL (ref 65–99)
GLUCOSE SERPL-MCNC: 215 MG/DL (ref 65–99)
GLUCOSE SERPL-MCNC: 221 MG/DL (ref 65–99)
GLUCOSE SERPL-MCNC: 223 MG/DL (ref 65–99)
GLUCOSE SERPL-MCNC: 232 MG/DL (ref 65–99)
GLUCOSE SERPL-MCNC: 241 MG/DL (ref 65–99)
GLUCOSE SERPL-MCNC: 257 MG/DL (ref 65–99)
GLUCOSE SERPL-MCNC: 262 MG/DL (ref 65–99)
GLUCOSE SERPL-MCNC: 265 MG/DL (ref 65–99)
GLUCOSE SERPL-MCNC: 292 MG/DL (ref 65–99)
GLUCOSE SERPL-MCNC: 313 MG/DL (ref 65–99)
GLUCOSE SERPL-MCNC: 337 MG/DL (ref 65–99)
GLUCOSE UR STRIP-MCNC: ABNORMAL MG/DL
GLUCOSE UR STRIP-MCNC: NEGATIVE MG/DL
GRAM STN SPEC: ABNORMAL
GRAM STN SPEC: NORMAL
GRAN CASTS URNS QL MICRO: ABNORMAL /LPF
HAV IGM SERPL QL IA: NORMAL
HBA1C MFR BLD: 7.6 % (ref 4.8–5.6)
HBV CORE IGM SERPL QL IA: NORMAL
HBV SURFACE AG SERPL QL IA: NORMAL
HCO3 BLDA-SCNC: 14.6 MMOL/L (ref 20–26)
HCO3 BLDA-SCNC: 16.7 MMOL/L (ref 20–26)
HCO3 BLDA-SCNC: 16.7 MMOL/L (ref 20–26)
HCO3 BLDA-SCNC: 17.8 MMOL/L (ref 20–26)
HCO3 BLDA-SCNC: 18.1 MMOL/L (ref 20–26)
HCO3 BLDA-SCNC: 18.3 MMOL/L (ref 20–26)
HCO3 BLDA-SCNC: 18.6 MMOL/L (ref 20–26)
HCO3 BLDA-SCNC: 22.1 MMOL/L (ref 20–26)
HCO3 BLDA-SCNC: 22.6 MMOL/L (ref 20–26)
HCT VFR BLD AUTO: 24.4 % (ref 37.5–51)
HCT VFR BLD AUTO: 27.2 % (ref 37.5–51)
HCT VFR BLD AUTO: 28 % (ref 37.5–51)
HCT VFR BLD AUTO: 28.1 % (ref 37.5–51)
HCT VFR BLD AUTO: 28.3 % (ref 37.5–51)
HCT VFR BLD AUTO: 29.2 % (ref 37.5–51)
HCT VFR BLD AUTO: 31 % (ref 37.5–51)
HCT VFR BLD AUTO: 31.4 % (ref 37.5–51)
HCT VFR BLD AUTO: 31.5 % (ref 37.5–51)
HCT VFR BLD AUTO: 32.2 % (ref 37.5–51)
HCT VFR BLD AUTO: 32.8 % (ref 37.5–51)
HCT VFR BLD AUTO: 33.4 % (ref 37.5–51)
HCT VFR BLD AUTO: 38.3 % (ref 37.5–51)
HCT VFR BLD CALC: 25.1 % (ref 38–51)
HCT VFR BLD CALC: 26.8 % (ref 38–51)
HCT VFR BLD CALC: 28.4 % (ref 38–51)
HCT VFR BLD CALC: 28.8 % (ref 38–51)
HCT VFR BLD CALC: 29.9 % (ref 38–51)
HCT VFR BLD CALC: 30.1 % (ref 38–51)
HCT VFR BLD CALC: 31.6 % (ref 38–51)
HCT VFR BLD CALC: 31.9 % (ref 38–51)
HCT VFR BLD CALC: 33.9 % (ref 38–51)
HCV AB SER DONR QL: NORMAL
HGB BLD-MCNC: 10 G/DL (ref 13–17.7)
HGB BLD-MCNC: 10.1 G/DL (ref 13–17.7)
HGB BLD-MCNC: 10.5 G/DL (ref 13–17.7)
HGB BLD-MCNC: 10.6 G/DL (ref 13–17.7)
HGB BLD-MCNC: 10.7 G/DL (ref 13–17.7)
HGB BLD-MCNC: 12.7 G/DL (ref 13–17.7)
HGB BLD-MCNC: 7.8 G/DL (ref 13–17.7)
HGB BLD-MCNC: 8.6 G/DL (ref 13–17.7)
HGB BLD-MCNC: 8.6 G/DL (ref 13–17.7)
HGB BLD-MCNC: 8.8 G/DL (ref 13–17.7)
HGB BLD-MCNC: 8.9 G/DL (ref 13–17.7)
HGB BLD-MCNC: 9 G/DL (ref 13–17.7)
HGB BLD-MCNC: 9.3 G/DL (ref 13–17.7)
HGB BLDA-MCNC: 10.3 G/DL (ref 14–18)
HGB BLDA-MCNC: 10.4 G/DL (ref 14–18)
HGB BLDA-MCNC: 11.1 G/DL (ref 14–18)
HGB BLDA-MCNC: 8.2 G/DL (ref 14–18)
HGB BLDA-MCNC: 8.8 G/DL (ref 14–18)
HGB BLDA-MCNC: 9.3 G/DL (ref 14–18)
HGB BLDA-MCNC: 9.4 G/DL (ref 14–18)
HGB BLDA-MCNC: 9.8 G/DL (ref 14–18)
HGB BLDA-MCNC: 9.8 G/DL (ref 14–18)
HGB UR QL STRIP.AUTO: ABNORMAL
HGB UR QL STRIP.AUTO: ABNORMAL
HYALINE CASTS UR QL AUTO: ABNORMAL /LPF
HYALINE CASTS UR QL AUTO: ABNORMAL /LPF
HYPOCHROMIA BLD QL: ABNORMAL
IMM GRANULOCYTES # BLD AUTO: 0.09 10*3/MM3 (ref 0–0.05)
IMM GRANULOCYTES # BLD AUTO: 0.18 10*3/MM3 (ref 0–0.05)
IMM GRANULOCYTES # BLD AUTO: 0.23 10*3/MM3 (ref 0–0.05)
IMM GRANULOCYTES # BLD AUTO: 0.32 10*3/MM3 (ref 0–0.05)
IMM GRANULOCYTES # BLD AUTO: 0.38 10*3/MM3 (ref 0–0.05)
IMM GRANULOCYTES # BLD AUTO: 0.44 10*3/MM3 (ref 0–0.05)
IMM GRANULOCYTES NFR BLD AUTO: 0.8 % (ref 0–0.5)
IMM GRANULOCYTES NFR BLD AUTO: 2 % (ref 0–0.5)
IMM GRANULOCYTES NFR BLD AUTO: 2.3 % (ref 0–0.5)
IMM GRANULOCYTES NFR BLD AUTO: 2.5 % (ref 0–0.5)
IMM GRANULOCYTES NFR BLD AUTO: 3.2 % (ref 0–0.5)
IMM GRANULOCYTES NFR BLD AUTO: 3.8 % (ref 0–0.5)
INHALED O2 CONCENTRATION: 21 %
INHALED O2 CONCENTRATION: 28 %
INHALED O2 CONCENTRATION: 28 %
INHALED O2 CONCENTRATION: 30 %
INHALED O2 CONCENTRATION: 30 %
INHALED O2 CONCENTRATION: 35 %
INHALED O2 CONCENTRATION: 35 %
INHALED O2 CONCENTRATION: 60 %
INHALED O2 CONCENTRATION: 60 %
INR PPP: 0.96 (ref 0.9–1.1)
ISOLATED FROM: ABNORMAL
KETONES UR QL STRIP: ABNORMAL
KETONES UR QL STRIP: NEGATIVE
LEUKOCYTE ESTERASE UR QL STRIP.AUTO: NEGATIVE
LEUKOCYTE ESTERASE UR QL STRIP.AUTO: NEGATIVE
LIPASE SERPL-CCNC: 30 U/L (ref 13–60)
LV EF 2D ECHO EST: 60 %
LYMPHOCYTES # BLD AUTO: 0.43 10*3/MM3 (ref 0.7–3.1)
LYMPHOCYTES # BLD AUTO: 0.69 10*3/MM3 (ref 0.7–3.1)
LYMPHOCYTES # BLD AUTO: 1.04 10*3/MM3 (ref 0.7–3.1)
LYMPHOCYTES # BLD AUTO: 1.39 10*3/MM3 (ref 0.7–3.1)
LYMPHOCYTES # BLD AUTO: 1.4 10*3/MM3 (ref 0.7–3.1)
LYMPHOCYTES # BLD AUTO: 1.92 10*3/MM3 (ref 0.7–3.1)
LYMPHOCYTES # BLD MANUAL: 0.57 10*3/MM3 (ref 0.7–3.1)
LYMPHOCYTES # BLD MANUAL: 0.66 10*3/MM3 (ref 0.7–3.1)
LYMPHOCYTES # BLD MANUAL: 0.68 10*3/MM3 (ref 0.7–3.1)
LYMPHOCYTES # BLD MANUAL: 1.29 10*3/MM3 (ref 0.7–3.1)
LYMPHOCYTES # BLD MANUAL: 1.31 10*3/MM3 (ref 0.7–3.1)
LYMPHOCYTES # BLD MANUAL: 1.35 10*3/MM3 (ref 0.7–3.1)
LYMPHOCYTES # BLD MANUAL: 1.82 10*3/MM3 (ref 0.7–3.1)
LYMPHOCYTES NFR BLD AUTO: 11.5 % (ref 19.6–45.3)
LYMPHOCYTES NFR BLD AUTO: 11.7 % (ref 19.6–45.3)
LYMPHOCYTES NFR BLD AUTO: 11.9 % (ref 19.6–45.3)
LYMPHOCYTES NFR BLD AUTO: 14.9 % (ref 19.6–45.3)
LYMPHOCYTES NFR BLD AUTO: 4 % (ref 19.6–45.3)
LYMPHOCYTES NFR BLD AUTO: 7 % (ref 19.6–45.3)
LYMPHOCYTES NFR BLD MANUAL: 1 % (ref 5–12)
LYMPHOCYTES NFR BLD MANUAL: 13 % (ref 5–12)
LYMPHOCYTES NFR BLD MANUAL: 13 % (ref 5–12)
LYMPHOCYTES NFR BLD MANUAL: 6 % (ref 5–12)
LYMPHOCYTES NFR BLD MANUAL: 7 % (ref 5–12)
LYMPHOCYTES NFR BLD MANUAL: 7 % (ref 5–12)
LYMPHOCYTES NFR BLD MANUAL: 8 % (ref 5–12)
Lab: ABNORMAL
MACROCYTES BLD QL SMEAR: ABNORMAL
MACROCYTES BLD QL SMEAR: NORMAL
MAGNESIUM SERPL-MCNC: 1.7 MG/DL (ref 1.6–2.6)
MAGNESIUM SERPL-MCNC: 1.8 MG/DL (ref 1.6–2.6)
MAGNESIUM SERPL-MCNC: 1.9 MG/DL (ref 1.6–2.6)
MAGNESIUM SERPL-MCNC: 1.9 MG/DL (ref 1.6–2.6)
MAGNESIUM SERPL-MCNC: 2.1 MG/DL (ref 1.6–2.6)
MAGNESIUM SERPL-MCNC: 2.3 MG/DL (ref 1.6–2.6)
MAGNESIUM SERPL-MCNC: 2.3 MG/DL (ref 1.6–2.6)
MAGNESIUM SERPL-MCNC: 2.4 MG/DL (ref 1.6–2.6)
MAGNESIUM SERPL-MCNC: 2.6 MG/DL (ref 1.6–2.6)
MAGNESIUM SERPL-MCNC: 2.8 MG/DL (ref 1.6–2.6)
MCH RBC QN AUTO: 30.6 PG (ref 26.6–33)
MCH RBC QN AUTO: 30.8 PG (ref 26.6–33)
MCH RBC QN AUTO: 31 PG (ref 26.6–33)
MCH RBC QN AUTO: 31 PG (ref 26.6–33)
MCH RBC QN AUTO: 31.1 PG (ref 26.6–33)
MCH RBC QN AUTO: 31.2 PG (ref 26.6–33)
MCH RBC QN AUTO: 31.2 PG (ref 26.6–33)
MCH RBC QN AUTO: 31.3 PG (ref 26.6–33)
MCH RBC QN AUTO: 31.3 PG (ref 26.6–33)
MCH RBC QN AUTO: 31.4 PG (ref 26.6–33)
MCH RBC QN AUTO: 31.4 PG (ref 26.6–33)
MCH RBC QN AUTO: 31.7 PG (ref 26.6–33)
MCH RBC QN AUTO: 31.8 PG (ref 26.6–33)
MCHC RBC AUTO-ENTMCNC: 30 G/DL (ref 31.5–35.7)
MCHC RBC AUTO-ENTMCNC: 30.6 G/DL (ref 31.5–35.7)
MCHC RBC AUTO-ENTMCNC: 30.8 G/DL (ref 31.5–35.7)
MCHC RBC AUTO-ENTMCNC: 31.4 G/DL (ref 31.5–35.7)
MCHC RBC AUTO-ENTMCNC: 31.4 G/DL (ref 31.5–35.7)
MCHC RBC AUTO-ENTMCNC: 31.6 G/DL (ref 31.5–35.7)
MCHC RBC AUTO-ENTMCNC: 31.8 G/DL (ref 31.5–35.7)
MCHC RBC AUTO-ENTMCNC: 32 G/DL (ref 31.5–35.7)
MCHC RBC AUTO-ENTMCNC: 32.1 G/DL (ref 31.5–35.7)
MCHC RBC AUTO-ENTMCNC: 32.9 G/DL (ref 31.5–35.7)
MCHC RBC AUTO-ENTMCNC: 33.2 G/DL (ref 31.5–35.7)
MCV RBC AUTO: 101.4 FL (ref 79–97)
MCV RBC AUTO: 102.6 FL (ref 79–97)
MCV RBC AUTO: 103.3 FL (ref 79–97)
MCV RBC AUTO: 95 FL (ref 79–97)
MCV RBC AUTO: 95.7 FL (ref 79–97)
MCV RBC AUTO: 96 FL (ref 79–97)
MCV RBC AUTO: 96.8 FL (ref 79–97)
MCV RBC AUTO: 97.3 FL (ref 79–97)
MCV RBC AUTO: 97.5 FL (ref 79–97)
MCV RBC AUTO: 98.7 FL (ref 79–97)
MCV RBC AUTO: 98.7 FL (ref 79–97)
MCV RBC AUTO: 98.9 FL (ref 79–97)
MCV RBC AUTO: 99.3 FL (ref 79–97)
METAMYELOCYTES NFR BLD MANUAL: 1 % (ref 0–0)
METAMYELOCYTES NFR BLD MANUAL: 2 % (ref 0–0)
METAMYELOCYTES NFR BLD MANUAL: 3 % (ref 0–0)
METHADONE UR QL SCN: NEGATIVE
METHGB BLD QL: 0.1 % (ref 0–3)
METHGB BLD QL: 0.2 % (ref 0–3)
METHGB BLD QL: 0.3 % (ref 0–3)
METHGB BLD QL: 0.4 % (ref 0–3)
METHGB BLD QL: 0.5 % (ref 0–3)
METHGB BLD QL: 0.6 % (ref 0–3)
METHGB BLD QL: <-0.1 % (ref 0–3)
MODALITY: ABNORMAL
MONOCYTES # BLD AUTO: 0.7 10*3/MM3 (ref 0.1–0.9)
MONOCYTES # BLD AUTO: 0.71 10*3/MM3 (ref 0.1–0.9)
MONOCYTES # BLD AUTO: 0.71 10*3/MM3 (ref 0.1–0.9)
MONOCYTES # BLD AUTO: 0.78 10*3/MM3 (ref 0.1–0.9)
MONOCYTES # BLD AUTO: 0.84 10*3/MM3 (ref 0.1–0.9)
MONOCYTES # BLD AUTO: 1.04 10*3/MM3 (ref 0.1–0.9)
MONOCYTES # BLD: 0.11 10*3/MM3 (ref 0.1–0.9)
MONOCYTES # BLD: 0.39 10*3/MM3 (ref 0.1–0.9)
MONOCYTES # BLD: 0.65 10*3/MM3 (ref 0.1–0.9)
MONOCYTES # BLD: 0.79 10*3/MM3 (ref 0.1–0.9)
MONOCYTES # BLD: 0.81 10*3/MM3 (ref 0.1–0.9)
MONOCYTES # BLD: 0.82 10*3/MM3 (ref 0.1–0.9)
MONOCYTES # BLD: 1.1 10*3/MM3 (ref 0.1–0.9)
MONOCYTES NFR BLD AUTO: 10.5 % (ref 5–12)
MONOCYTES NFR BLD AUTO: 5.5 % (ref 5–12)
MONOCYTES NFR BLD AUTO: 5.9 % (ref 5–12)
MONOCYTES NFR BLD AUTO: 6.7 % (ref 5–12)
MONOCYTES NFR BLD AUTO: 7.7 % (ref 5–12)
MONOCYTES NFR BLD AUTO: 7.9 % (ref 5–12)
MRSA DNA SPEC QL NAA+PROBE: NEGATIVE
NEUTROPHILS # BLD AUTO: 4.77 10*3/MM3 (ref 1.7–7)
NEUTROPHILS # BLD AUTO: 5.53 10*3/MM3 (ref 1.7–7)
NEUTROPHILS # BLD AUTO: 6.51 10*3/MM3 (ref 1.7–7)
NEUTROPHILS # BLD AUTO: 6.92 10*3/MM3 (ref 1.7–7)
NEUTROPHILS # BLD AUTO: 7.46 10*3/MM3 (ref 1.7–7)
NEUTROPHILS # BLD AUTO: 8.24 10*3/MM3 (ref 1.7–7)
NEUTROPHILS # BLD AUTO: 8.33 10*3/MM3 (ref 1.7–7)
NEUTROPHILS NFR BLD AUTO: 6.98 10*3/MM3 (ref 1.7–7)
NEUTROPHILS NFR BLD AUTO: 7.93 10*3/MM3 (ref 1.7–7)
NEUTROPHILS NFR BLD AUTO: 75.2 % (ref 42.7–76)
NEUTROPHILS NFR BLD AUTO: 76 % (ref 42.7–76)
NEUTROPHILS NFR BLD AUTO: 76.9 % (ref 42.7–76)
NEUTROPHILS NFR BLD AUTO: 77.5 % (ref 42.7–76)
NEUTROPHILS NFR BLD AUTO: 79.9 % (ref 42.7–76)
NEUTROPHILS NFR BLD AUTO: 8.89 10*3/MM3 (ref 1.7–7)
NEUTROPHILS NFR BLD AUTO: 87 % (ref 42.7–76)
NEUTROPHILS NFR BLD AUTO: 9.26 10*3/MM3 (ref 1.7–7)
NEUTROPHILS NFR BLD AUTO: 9.27 10*3/MM3 (ref 1.7–7)
NEUTROPHILS NFR BLD AUTO: 9.68 10*3/MM3 (ref 1.7–7)
NEUTROPHILS NFR BLD MANUAL: 58 % (ref 42.7–76)
NEUTROPHILS NFR BLD MANUAL: 61 % (ref 42.7–76)
NEUTROPHILS NFR BLD MANUAL: 62 % (ref 42.7–76)
NEUTROPHILS NFR BLD MANUAL: 64 % (ref 42.7–76)
NEUTROPHILS NFR BLD MANUAL: 65 % (ref 42.7–76)
NEUTROPHILS NFR BLD MANUAL: 65 % (ref 42.7–76)
NEUTROPHILS NFR BLD MANUAL: 73 % (ref 42.7–76)
NEUTS BAND NFR BLD MANUAL: 10 % (ref 0–5)
NEUTS BAND NFR BLD MANUAL: 11 % (ref 0–5)
NEUTS BAND NFR BLD MANUAL: 12 % (ref 0–5)
NEUTS BAND NFR BLD MANUAL: 13 % (ref 0–5)
NEUTS BAND NFR BLD MANUAL: 15 % (ref 0–5)
NEUTS BAND NFR BLD MANUAL: 19 % (ref 0–5)
NEUTS BAND NFR BLD MANUAL: 9 % (ref 0–5)
NEUTS VAC BLD QL SMEAR: ABNORMAL
NEUTS VAC BLD QL SMEAR: ABNORMAL
NITRITE UR QL STRIP: NEGATIVE
NITRITE UR QL STRIP: NEGATIVE
NOTE: ABNORMAL
NOTIFIED BY: ABNORMAL
NOTIFIED WHO: ABNORMAL
NRBC BLD AUTO-RTO: 0 /100 WBC (ref 0–0.2)
NRBC BLD AUTO-RTO: 0.3 /100 WBC (ref 0–0.2)
NRBC BLD AUTO-RTO: 0.4 /100 WBC (ref 0–0.2)
OPIATES UR QL: NEGATIVE
OSMOLALITY SERPL: 347 MOSM/KG (ref 275–300)
OXYCODONE UR QL SCN: NEGATIVE
OXYHGB MFR BLDV: 89.9 % (ref 94–99)
OXYHGB MFR BLDV: 94.9 % (ref 94–99)
OXYHGB MFR BLDV: 95.1 % (ref 94–99)
OXYHGB MFR BLDV: 96 % (ref 94–99)
OXYHGB MFR BLDV: 96 % (ref 94–99)
OXYHGB MFR BLDV: 96.9 % (ref 94–99)
OXYHGB MFR BLDV: 97.4 % (ref 94–99)
OXYHGB MFR BLDV: 97.6 % (ref 94–99)
OXYHGB MFR BLDV: 98.1 % (ref 94–99)
PCO2 BLDA: 22.1 MM HG (ref 35–45)
PCO2 BLDA: 26 MM HG (ref 35–45)
PCO2 BLDA: 29.6 MM HG (ref 35–45)
PCO2 BLDA: 29.9 MM HG (ref 35–45)
PCO2 BLDA: 30.6 MM HG (ref 35–45)
PCO2 BLDA: 34 MM HG (ref 35–45)
PCO2 BLDA: 37.9 MM HG (ref 35–45)
PCO2 BLDA: 38.4 MM HG (ref 35–45)
PCO2 BLDA: 39.1 MM HG (ref 35–45)
PCO2 TEMP ADJ BLD: ABNORMAL MM[HG]
PCP UR QL SCN: NEGATIVE
PEEP RESPIRATORY: 5 CM[H2O]
PEEP RESPIRATORY: 7.5 CM[H2O]
PEEP RESPIRATORY: 7.5 CM[H2O]
PH BLDA: 7.24 PH UNITS (ref 7.35–7.45)
PH BLDA: 7.3 PH UNITS (ref 7.35–7.45)
PH BLDA: 7.33 PH UNITS (ref 7.35–7.45)
PH BLDA: 7.37 PH UNITS (ref 7.35–7.45)
PH BLDA: 7.38 PH UNITS (ref 7.35–7.45)
PH BLDA: 7.39 PH UNITS (ref 7.35–7.45)
PH BLDA: 7.42 PH UNITS (ref 7.35–7.45)
PH BLDA: 7.43 PH UNITS (ref 7.35–7.45)
PH BLDA: 7.49 PH UNITS (ref 7.35–7.45)
PH UR STRIP.AUTO: 5.5 [PH] (ref 5–8)
PH UR STRIP.AUTO: 5.5 [PH] (ref 5–8)
PH, TEMP CORRECTED: ABNORMAL
PHOSPHATE SERPL-MCNC: 0.7 MG/DL (ref 2.5–4.5)
PHOSPHATE SERPL-MCNC: 1.1 MG/DL (ref 2.5–4.5)
PHOSPHATE SERPL-MCNC: 1.7 MG/DL (ref 2.5–4.5)
PHOSPHATE SERPL-MCNC: 2.9 MG/DL (ref 2.5–4.5)
PHOSPHATE SERPL-MCNC: 3.2 MG/DL (ref 2.5–4.5)
PHOSPHATE SERPL-MCNC: 4.2 MG/DL (ref 2.5–4.5)
PHOSPHATE SERPL-MCNC: 4.6 MG/DL (ref 2.5–4.5)
PHOSPHATE SERPL-MCNC: 4.7 MG/DL (ref 2.5–4.5)
PHOSPHATE SERPL-MCNC: 4.9 MG/DL (ref 2.5–4.5)
PHOSPHATE SERPL-MCNC: 5.1 MG/DL (ref 2.5–4.5)
PHOSPHATE SERPL-MCNC: 5.4 MG/DL (ref 2.5–4.5)
PHOSPHATE SERPL-MCNC: 5.5 MG/DL (ref 2.5–4.5)
PLAT MORPH BLD: NORMAL
PLATELET # BLD AUTO: 108 10*3/MM3 (ref 140–450)
PLATELET # BLD AUTO: 133 10*3/MM3 (ref 140–450)
PLATELET # BLD AUTO: 135 10*3/MM3 (ref 140–450)
PLATELET # BLD AUTO: 148 10*3/MM3 (ref 140–450)
PLATELET # BLD AUTO: 176 10*3/MM3 (ref 140–450)
PLATELET # BLD AUTO: 184 10*3/MM3 (ref 140–450)
PLATELET # BLD AUTO: 224 10*3/MM3 (ref 140–450)
PLATELET # BLD AUTO: 258 10*3/MM3 (ref 140–450)
PLATELET # BLD AUTO: 274 10*3/MM3 (ref 140–450)
PLATELET # BLD AUTO: 82 10*3/MM3 (ref 140–450)
PLATELET # BLD AUTO: 86 10*3/MM3 (ref 140–450)
PLATELET # BLD AUTO: 86 10*3/MM3 (ref 140–450)
PLATELET # BLD AUTO: 91 10*3/MM3 (ref 140–450)
PMV BLD AUTO: 10 FL (ref 6–12)
PMV BLD AUTO: 10.1 FL (ref 6–12)
PMV BLD AUTO: 9.1 FL (ref 6–12)
PMV BLD AUTO: 9.2 FL (ref 6–12)
PMV BLD AUTO: 9.3 FL (ref 6–12)
PMV BLD AUTO: 9.4 FL (ref 6–12)
PMV BLD AUTO: 9.6 FL (ref 6–12)
PMV BLD AUTO: 9.7 FL (ref 6–12)
PMV BLD AUTO: 9.8 FL (ref 6–12)
PMV BLD AUTO: 9.9 FL (ref 6–12)
PMV BLD AUTO: 9.9 FL (ref 6–12)
PO2 BLDA: 108 MM HG (ref 83–108)
PO2 BLDA: 115 MM HG (ref 83–108)
PO2 BLDA: 129 MM HG (ref 83–108)
PO2 BLDA: 134 MM HG (ref 83–108)
PO2 BLDA: 56.6 MM HG (ref 83–108)
PO2 BLDA: 77.9 MM HG (ref 83–108)
PO2 BLDA: 83.2 MM HG (ref 83–108)
PO2 BLDA: 86.3 MM HG (ref 83–108)
PO2 BLDA: 86.7 MM HG (ref 83–108)
PO2 TEMP ADJ BLD: ABNORMAL MM[HG]
POTASSIUM SERPL-SCNC: 2.9 MMOL/L (ref 3.5–5.2)
POTASSIUM SERPL-SCNC: 3.1 MMOL/L (ref 3.5–5.2)
POTASSIUM SERPL-SCNC: 3.1 MMOL/L (ref 3.5–5.2)
POTASSIUM SERPL-SCNC: 3.2 MMOL/L (ref 3.5–5.2)
POTASSIUM SERPL-SCNC: 3.2 MMOL/L (ref 3.5–5.2)
POTASSIUM SERPL-SCNC: 3.3 MMOL/L (ref 3.5–5.2)
POTASSIUM SERPL-SCNC: 3.4 MMOL/L (ref 3.5–5.2)
POTASSIUM SERPL-SCNC: 3.5 MMOL/L (ref 3.5–5.2)
POTASSIUM SERPL-SCNC: 3.5 MMOL/L (ref 3.5–5.2)
POTASSIUM SERPL-SCNC: 3.6 MMOL/L (ref 3.5–5.2)
POTASSIUM SERPL-SCNC: 3.7 MMOL/L (ref 3.5–5.2)
POTASSIUM SERPL-SCNC: 3.7 MMOL/L (ref 3.5–5.2)
POTASSIUM SERPL-SCNC: 3.8 MMOL/L (ref 3.5–5.2)
POTASSIUM SERPL-SCNC: 4.4 MMOL/L (ref 3.5–5.2)
POTASSIUM SERPL-SCNC: 4.5 MMOL/L (ref 3.5–5.2)
POTASSIUM SERPL-SCNC: 4.6 MMOL/L (ref 3.5–5.2)
POTASSIUM SERPL-SCNC: 4.7 MMOL/L (ref 3.5–5.2)
POTASSIUM SERPL-SCNC: 4.7 MMOL/L (ref 3.5–5.2)
POTASSIUM SERPL-SCNC: 4.8 MMOL/L (ref 3.5–5.2)
POTASSIUM SERPL-SCNC: 4.9 MMOL/L (ref 3.5–5.2)
POTASSIUM SERPL-SCNC: 4.9 MMOL/L (ref 3.5–5.2)
POTASSIUM SERPL-SCNC: 5 MMOL/L (ref 3.5–5.2)
POTASSIUM SERPL-SCNC: 5 MMOL/L (ref 3.5–5.2)
PROPOXYPH UR QL: NEGATIVE
PROT SERPL-MCNC: 5.6 G/DL (ref 6–8.5)
PROT SERPL-MCNC: 6.7 G/DL (ref 6–8.5)
PROT SERPL-MCNC: 8.4 G/DL (ref 6–8.5)
PROT UR QL STRIP: ABNORMAL
PROT UR QL STRIP: ABNORMAL
PROTHROMBIN TIME: 13.1 SECONDS (ref 12.8–14.5)
PSV: 10 CMH2O
PSV: 10 CMH2O
QT INTERVAL: 358 MS
QT INTERVAL: 368 MS
QTC INTERVAL: 479 MS
QTC INTERVAL: 517 MS
RBC # BLD AUTO: 2.55 10*6/MM3 (ref 4.14–5.8)
RBC # BLD AUTO: 2.74 10*6/MM3 (ref 4.14–5.8)
RBC # BLD AUTO: 2.79 10*6/MM3 (ref 4.14–5.8)
RBC # BLD AUTO: 2.83 10*6/MM3 (ref 4.14–5.8)
RBC # BLD AUTO: 2.85 10*6/MM3 (ref 4.14–5.8)
RBC # BLD AUTO: 2.88 10*6/MM3 (ref 4.14–5.8)
RBC # BLD AUTO: 3 10*6/MM3 (ref 4.14–5.8)
RBC # BLD AUTO: 3.18 10*6/MM3 (ref 4.14–5.8)
RBC # BLD AUTO: 3.19 10*6/MM3 (ref 4.14–5.8)
RBC # BLD AUTO: 3.37 10*6/MM3 (ref 4.14–5.8)
RBC # BLD AUTO: 3.39 10*6/MM3 (ref 4.14–5.8)
RBC # BLD AUTO: 3.45 10*6/MM3 (ref 4.14–5.8)
RBC # BLD AUTO: 3.99 10*6/MM3 (ref 4.14–5.8)
RBC # UR STRIP: ABNORMAL /HPF
RBC # UR STRIP: ABNORMAL /HPF
RBC MORPH BLD: NORMAL
RBC MORPH BLD: NORMAL
REF LAB TEST METHOD: ABNORMAL
REF LAB TEST METHOD: ABNORMAL
S AUREUS DNA SPEC QL NAA+PROBE: NEGATIVE
SALICYLATES SERPL-MCNC: <0.3 MG/DL
SAO2 % BLDCOA: 91.5 % (ref 94–99)
SAO2 % BLDCOA: 96.4 % (ref 94–99)
SAO2 % BLDCOA: 96.5 % (ref 94–99)
SAO2 % BLDCOA: 96.9 % (ref 94–99)
SAO2 % BLDCOA: 97.4 % (ref 94–99)
SAO2 % BLDCOA: 98.7 % (ref 94–99)
SAO2 % BLDCOA: 98.9 % (ref 94–99)
SAO2 % BLDCOA: 99.2 % (ref 94–99)
SAO2 % BLDCOA: >99.2 % (ref 94–99)
SARS-COV-2 RNA PNL SPEC NAA+PROBE: NOT DETECTED
SCAN SLIDE: NORMAL
SET MECH RESP RATE: 18
SET MECH RESP RATE: 20
SMALL PLATELETS BLD QL SMEAR: ABNORMAL
SMALL PLATELETS BLD QL SMEAR: ABNORMAL
SODIUM SERPL-SCNC: 136 MMOL/L (ref 136–145)
SODIUM SERPL-SCNC: 137 MMOL/L (ref 136–145)
SODIUM SERPL-SCNC: 141 MMOL/L (ref 136–145)
SODIUM SERPL-SCNC: 142 MMOL/L (ref 136–145)
SODIUM SERPL-SCNC: 142 MMOL/L (ref 136–145)
SODIUM SERPL-SCNC: 143 MMOL/L (ref 136–145)
SODIUM SERPL-SCNC: 144 MMOL/L (ref 136–145)
SODIUM SERPL-SCNC: 145 MMOL/L (ref 136–145)
SODIUM SERPL-SCNC: 145 MMOL/L (ref 136–145)
SODIUM SERPL-SCNC: 146 MMOL/L (ref 136–145)
SODIUM SERPL-SCNC: 147 MMOL/L (ref 136–145)
SODIUM SERPL-SCNC: 147 MMOL/L (ref 136–145)
SODIUM SERPL-SCNC: 151 MMOL/L (ref 136–145)
SODIUM SERPL-SCNC: 151 MMOL/L (ref 136–145)
SODIUM SERPL-SCNC: 153 MMOL/L (ref 136–145)
SODIUM SERPL-SCNC: 154 MMOL/L (ref 136–145)
SODIUM SERPL-SCNC: 155 MMOL/L (ref 136–145)
SODIUM SERPL-SCNC: 155 MMOL/L (ref 136–145)
SODIUM SERPL-SCNC: 156 MMOL/L (ref 136–145)
SODIUM SERPL-SCNC: 160 MMOL/L (ref 136–145)
SODIUM SERPL-SCNC: 163 MMOL/L (ref 136–145)
SODIUM SERPL-SCNC: 164 MMOL/L (ref 136–145)
SODIUM SERPL-SCNC: 166 MMOL/L (ref 136–145)
SP GR UR STRIP: 1.01 (ref 1–1.03)
SP GR UR STRIP: 1.02 (ref 1–1.03)
SQUAMOUS #/AREA URNS HPF: ABNORMAL /HPF
SQUAMOUS #/AREA URNS HPF: ABNORMAL /HPF
TOXIC GRANULATION: ABNORMAL
TRICYCLICS UR QL SCN: NEGATIVE
TROPONIN T SERPL-MCNC: <0.01 NG/ML (ref 0–0.03)
UROBILINOGEN UR QL STRIP: ABNORMAL
UROBILINOGEN UR QL STRIP: ABNORMAL
VANCOMYCIN SERPL-MCNC: 20.2 MCG/ML (ref 5–40)
VANCOMYCIN SERPL-MCNC: 20.5 MCG/ML (ref 5–40)
VANCOMYCIN SERPL-MCNC: 20.7 MCG/ML (ref 5–40)
VANCOMYCIN SERPL-MCNC: 21.6 MCG/ML (ref 5–40)
VANCOMYCIN SERPL-MCNC: 22.1 MCG/ML (ref 5–40)
VANCOMYCIN SERPL-MCNC: 27.2 MCG/ML (ref 5–40)
VANCOMYCIN TROUGH SERPL-MCNC: 11.8 MCG/ML (ref 5–20)
VANCOMYCIN TROUGH SERPL-MCNC: 30.7 MCG/ML (ref 5–20)
VARIANT LYMPHS NFR BLD MANUAL: 10 % (ref 19.6–45.3)
VARIANT LYMPHS NFR BLD MANUAL: 12 % (ref 19.6–45.3)
VARIANT LYMPHS NFR BLD MANUAL: 13 % (ref 19.6–45.3)
VARIANT LYMPHS NFR BLD MANUAL: 14 % (ref 19.6–45.3)
VARIANT LYMPHS NFR BLD MANUAL: 17 % (ref 19.6–45.3)
VARIANT LYMPHS NFR BLD MANUAL: 8 % (ref 19.6–45.3)
VARIANT LYMPHS NFR BLD MANUAL: 9 % (ref 19.6–45.3)
VENTILATOR MODE: ABNORMAL
VT ON VENT VENT: 510 ML
VT ON VENT VENT: 550 ML
WBC # UR STRIP: ABNORMAL /HPF
WBC # UR STRIP: ABNORMAL /HPF
WBC NRBC COR # BLD: 10.08 10*3/MM3 (ref 3.4–10.8)
WBC NRBC COR # BLD: 10.65 10*3/MM3 (ref 3.4–10.8)
WBC NRBC COR # BLD: 10.7 10*3/MM3 (ref 3.4–10.8)
WBC NRBC COR # BLD: 11.26 10*3/MM3 (ref 3.4–10.8)
WBC NRBC COR # BLD: 11.68 10*3/MM3 (ref 3.4–10.8)
WBC NRBC COR # BLD: 11.97 10*3/MM3 (ref 3.4–10.8)
WBC NRBC COR # BLD: 12.88 10*3/MM3 (ref 3.4–10.8)
WBC NRBC COR # BLD: 6.28 10*3/MM3 (ref 3.4–10.8)
WBC NRBC COR # BLD: 6.58 10*3/MM3 (ref 3.4–10.8)
WBC NRBC COR # BLD: 8.46 10*3/MM3 (ref 3.4–10.8)
WBC NRBC COR # BLD: 9.07 10*3/MM3 (ref 3.4–10.8)
WBC NRBC COR # BLD: 9.23 10*3/MM3 (ref 3.4–10.8)
WBC NRBC COR # BLD: 9.92 10*3/MM3 (ref 3.4–10.8)

## 2022-01-01 PROCEDURE — 80202 ASSAY OF VANCOMYCIN: CPT | Performed by: INTERNAL MEDICINE

## 2022-01-01 PROCEDURE — 83735 ASSAY OF MAGNESIUM: CPT | Performed by: HOSPITALIST

## 2022-01-01 PROCEDURE — 85025 COMPLETE CBC W/AUTO DIFF WBC: CPT | Performed by: NURSE PRACTITIONER

## 2022-01-01 PROCEDURE — 80048 BASIC METABOLIC PNL TOTAL CA: CPT | Performed by: HOSPITALIST

## 2022-01-01 PROCEDURE — 71045 X-RAY EXAM CHEST 1 VIEW: CPT

## 2022-01-01 PROCEDURE — 99223 1ST HOSP IP/OBS HIGH 75: CPT | Performed by: HOSPITALIST

## 2022-01-01 PROCEDURE — 82805 BLOOD GASES W/O2 SATURATION: CPT

## 2022-01-01 PROCEDURE — 70450 CT HEAD/BRAIN W/O DYE: CPT | Performed by: RADIOLOGY

## 2022-01-01 PROCEDURE — 94799 UNLISTED PULMONARY SVC/PX: CPT

## 2022-01-01 PROCEDURE — 84100 ASSAY OF PHOSPHORUS: CPT | Performed by: HOSPITALIST

## 2022-01-01 PROCEDURE — 87040 BLOOD CULTURE FOR BACTERIA: CPT | Performed by: HOSPITALIST

## 2022-01-01 PROCEDURE — 71045 X-RAY EXAM CHEST 1 VIEW: CPT | Performed by: RADIOLOGY

## 2022-01-01 PROCEDURE — 25010000002 LORAZEPAM PER 2 MG: Performed by: INTERNAL MEDICINE

## 2022-01-01 PROCEDURE — 25010000002 CEFTRIAXONE PER 250 MG: Performed by: NURSE PRACTITIONER

## 2022-01-01 PROCEDURE — 82962 GLUCOSE BLOOD TEST: CPT

## 2022-01-01 PROCEDURE — 25010000002 HYDRALAZINE PER 20 MG: Performed by: INTERNAL MEDICINE

## 2022-01-01 PROCEDURE — 63710000001 INSULIN DETEMIR PER 5 UNITS: Performed by: HOSPITALIST

## 2022-01-01 PROCEDURE — 25010000002 PROPOFOL 10 MG/ML EMULSION: Performed by: HOSPITALIST

## 2022-01-01 PROCEDURE — 99291 CRITICAL CARE FIRST HOUR: CPT | Performed by: INTERNAL MEDICINE

## 2022-01-01 PROCEDURE — 84132 ASSAY OF SERUM POTASSIUM: CPT | Performed by: HOSPITALIST

## 2022-01-01 PROCEDURE — 82375 ASSAY CARBOXYHB QUANT: CPT

## 2022-01-01 PROCEDURE — 82550 ASSAY OF CK (CPK): CPT | Performed by: HOSPITALIST

## 2022-01-01 PROCEDURE — 80048 BASIC METABOLIC PNL TOTAL CA: CPT | Performed by: INTERNAL MEDICINE

## 2022-01-01 PROCEDURE — 93010 ELECTROCARDIOGRAM REPORT: CPT | Performed by: INTERNAL MEDICINE

## 2022-01-01 PROCEDURE — 70450 CT HEAD/BRAIN W/O DYE: CPT

## 2022-01-01 PROCEDURE — 83050 HGB METHEMOGLOBIN QUAN: CPT

## 2022-01-01 PROCEDURE — 25010000002 FENTANYL CITRATE (PF) 1000 MCG/20ML SOLUTION: Performed by: HOSPITALIST

## 2022-01-01 PROCEDURE — 87636 SARSCOV2 & INF A&B AMP PRB: CPT | Performed by: EMERGENCY MEDICINE

## 2022-01-01 PROCEDURE — 85007 BL SMEAR W/DIFF WBC COUNT: CPT | Performed by: HOSPITALIST

## 2022-01-01 PROCEDURE — 0BC38ZZ EXTIRPATION OF MATTER FROM RIGHT MAIN BRONCHUS, VIA NATURAL OR ARTIFICIAL OPENING ENDOSCOPIC: ICD-10-PCS | Performed by: INTERNAL MEDICINE

## 2022-01-01 PROCEDURE — 82077 ASSAY SPEC XCP UR&BREATH IA: CPT | Performed by: NURSE PRACTITIONER

## 2022-01-01 PROCEDURE — 25010000002 VANCOMYCIN 5 G RECONSTITUTED SOLUTION: Performed by: HOSPITALIST

## 2022-01-01 PROCEDURE — 87150 DNA/RNA AMPLIFIED PROBE: CPT | Performed by: NURSE PRACTITIONER

## 2022-01-01 PROCEDURE — 87077 CULTURE AEROBIC IDENTIFY: CPT | Performed by: NURSE PRACTITIONER

## 2022-01-01 PROCEDURE — 25010000002 CEFTRIAXONE PER 250 MG: Performed by: PHYSICIAN ASSISTANT

## 2022-01-01 PROCEDURE — 85025 COMPLETE CBC W/AUTO DIFF WBC: CPT | Performed by: HOSPITALIST

## 2022-01-01 PROCEDURE — 87205 SMEAR GRAM STAIN: CPT | Performed by: INTERNAL MEDICINE

## 2022-01-01 PROCEDURE — 25010000002 ENOXAPARIN PER 10 MG: Performed by: HOSPITALIST

## 2022-01-01 PROCEDURE — 25010000002 CEFTRIAXONE PER 250 MG: Performed by: INTERNAL MEDICINE

## 2022-01-01 PROCEDURE — 99233 SBSQ HOSP IP/OBS HIGH 50: CPT | Performed by: INTERNAL MEDICINE

## 2022-01-01 PROCEDURE — 25010000002 THIAMINE PER 100 MG: Performed by: HOSPITALIST

## 2022-01-01 PROCEDURE — 99291 CRITICAL CARE FIRST HOUR: CPT | Performed by: HOSPITALIST

## 2022-01-01 PROCEDURE — 36600 WITHDRAWAL OF ARTERIAL BLOOD: CPT

## 2022-01-01 PROCEDURE — 25010000002 PIPERACILLIN SOD-TAZOBACTAM PER 1 G: Performed by: HOSPITALIST

## 2022-01-01 PROCEDURE — 94002 VENT MGMT INPAT INIT DAY: CPT

## 2022-01-01 PROCEDURE — 80306 DRUG TEST PRSMV INSTRMNT: CPT | Performed by: NURSE PRACTITIONER

## 2022-01-01 PROCEDURE — 87040 BLOOD CULTURE FOR BACTERIA: CPT | Performed by: NURSE PRACTITIONER

## 2022-01-01 PROCEDURE — 63710000001 INSULIN ASPART PER 5 UNITS: Performed by: HOSPITALIST

## 2022-01-01 PROCEDURE — 94003 VENT MGMT INPAT SUBQ DAY: CPT

## 2022-01-01 PROCEDURE — 25010000002 THIAMINE PER 100 MG: Performed by: NURSE PRACTITIONER

## 2022-01-01 PROCEDURE — 25010000002 ENOXAPARIN PER 10 MG: Performed by: INTERNAL MEDICINE

## 2022-01-01 PROCEDURE — 31645 BRNCHSC W/THER ASPIR 1ST: CPT | Performed by: INTERNAL MEDICINE

## 2022-01-01 PROCEDURE — 80053 COMPREHEN METABOLIC PANEL: CPT | Performed by: HOSPITALIST

## 2022-01-01 PROCEDURE — 5A12012 PERFORMANCE OF CARDIAC OUTPUT, SINGLE, MANUAL: ICD-10-PCS | Performed by: SURGERY

## 2022-01-01 PROCEDURE — 76775 US EXAM ABDO BACK WALL LIM: CPT | Performed by: RADIOLOGY

## 2022-01-01 PROCEDURE — 86140 C-REACTIVE PROTEIN: CPT | Performed by: HOSPITALIST

## 2022-01-01 PROCEDURE — 83605 ASSAY OF LACTIC ACID: CPT | Performed by: PHYSICIAN ASSISTANT

## 2022-01-01 PROCEDURE — 93306 TTE W/DOPPLER COMPLETE: CPT | Performed by: INTERNAL MEDICINE

## 2022-01-01 PROCEDURE — 87186 SC STD MICRODIL/AGAR DIL: CPT | Performed by: NURSE PRACTITIONER

## 2022-01-01 PROCEDURE — 80202 ASSAY OF VANCOMYCIN: CPT | Performed by: HOSPITALIST

## 2022-01-01 PROCEDURE — 80179 DRUG ASSAY SALICYLATE: CPT | Performed by: NURSE PRACTITIONER

## 2022-01-01 PROCEDURE — 0 MAGNESIUM SULFATE 4 GM/100ML SOLUTION: Performed by: HOSPITALIST

## 2022-01-01 PROCEDURE — 83036 HEMOGLOBIN GLYCOSYLATED A1C: CPT | Performed by: HOSPITALIST

## 2022-01-01 PROCEDURE — 76775 US EXAM ABDO BACK WALL LIM: CPT

## 2022-01-01 PROCEDURE — 25010000002 LORAZEPAM PER 2 MG: Performed by: HOSPITALIST

## 2022-01-01 PROCEDURE — 93005 ELECTROCARDIOGRAM TRACING: CPT | Performed by: HOSPITALIST

## 2022-01-01 PROCEDURE — 85730 THROMBOPLASTIN TIME PARTIAL: CPT | Performed by: NURSE PRACTITIONER

## 2022-01-01 PROCEDURE — 74176 CT ABD & PELVIS W/O CONTRAST: CPT

## 2022-01-01 PROCEDURE — 25010000002 POTASSIUM CHLORIDE PER 2 MEQ OF POTASSIUM: Performed by: HOSPITALIST

## 2022-01-01 PROCEDURE — 86140 C-REACTIVE PROTEIN: CPT | Performed by: NURSE PRACTITIONER

## 2022-01-01 PROCEDURE — 99233 SBSQ HOSP IP/OBS HIGH 50: CPT | Performed by: HOSPITALIST

## 2022-01-01 PROCEDURE — 85610 PROTHROMBIN TIME: CPT | Performed by: NURSE PRACTITIONER

## 2022-01-01 PROCEDURE — 25010000002 LORAZEPAM PER 2 MG: Performed by: EMERGENCY MEDICINE

## 2022-01-01 PROCEDURE — 36556 INSERT NON-TUNNEL CV CATH: CPT | Performed by: SURGERY

## 2022-01-01 PROCEDURE — 99239 HOSP IP/OBS DSCHRG MGMT >30: CPT | Performed by: INTERNAL MEDICINE

## 2022-01-01 PROCEDURE — 83930 ASSAY OF BLOOD OSMOLALITY: CPT | Performed by: INTERNAL MEDICINE

## 2022-01-01 PROCEDURE — 93306 TTE W/DOPPLER COMPLETE: CPT

## 2022-01-01 PROCEDURE — 83735 ASSAY OF MAGNESIUM: CPT | Performed by: NURSE PRACTITIONER

## 2022-01-01 PROCEDURE — 82140 ASSAY OF AMMONIA: CPT | Performed by: INTERNAL MEDICINE

## 2022-01-01 PROCEDURE — 84484 ASSAY OF TROPONIN QUANT: CPT | Performed by: HOSPITALIST

## 2022-01-01 PROCEDURE — 99232 SBSQ HOSP IP/OBS MODERATE 35: CPT | Performed by: SURGERY

## 2022-01-01 PROCEDURE — 87186 SC STD MICRODIL/AGAR DIL: CPT | Performed by: HOSPITALIST

## 2022-01-01 PROCEDURE — 25010000002 FENTANYL CITRATE (PF) 2500 MCG/50ML SOLUTION: Performed by: HOSPITALIST

## 2022-01-01 PROCEDURE — 80202 ASSAY OF VANCOMYCIN: CPT

## 2022-01-01 PROCEDURE — 80074 ACUTE HEPATITIS PANEL: CPT | Performed by: INTERNAL MEDICINE

## 2022-01-01 PROCEDURE — 86140 C-REACTIVE PROTEIN: CPT | Performed by: INTERNAL MEDICINE

## 2022-01-01 PROCEDURE — 5A1955Z RESPIRATORY VENTILATION, GREATER THAN 96 CONSECUTIVE HOURS: ICD-10-PCS | Performed by: EMERGENCY MEDICINE

## 2022-01-01 PROCEDURE — 87147 CULTURE TYPE IMMUNOLOGIC: CPT | Performed by: HOSPITALIST

## 2022-01-01 PROCEDURE — 70486 CT MAXILLOFACIAL W/O DYE: CPT

## 2022-01-01 PROCEDURE — 93005 ELECTROCARDIOGRAM TRACING: CPT | Performed by: NURSE PRACTITIONER

## 2022-01-01 PROCEDURE — 0BC78ZZ EXTIRPATION OF MATTER FROM LEFT MAIN BRONCHUS, VIA NATURAL OR ARTIFICIAL OPENING ENDOSCOPIC: ICD-10-PCS | Performed by: INTERNAL MEDICINE

## 2022-01-01 PROCEDURE — 87070 CULTURE OTHR SPECIMN AEROBIC: CPT | Performed by: INTERNAL MEDICINE

## 2022-01-01 PROCEDURE — 0 POTASSIUM CHLORIDE 10 MEQ/100ML SOLUTION: Performed by: HOSPITALIST

## 2022-01-01 PROCEDURE — 99285 EMERGENCY DEPT VISIT HI MDM: CPT

## 2022-01-01 PROCEDURE — 81001 URINALYSIS AUTO W/SCOPE: CPT | Performed by: INTERNAL MEDICINE

## 2022-01-01 PROCEDURE — 87641 MR-STAPH DNA AMP PROBE: CPT | Performed by: HOSPITALIST

## 2022-01-01 PROCEDURE — 71250 CT THORAX DX C-: CPT

## 2022-01-01 PROCEDURE — 0BH17EZ INSERTION OF ENDOTRACHEAL AIRWAY INTO TRACHEA, VIA NATURAL OR ARTIFICIAL OPENING: ICD-10-PCS | Performed by: EMERGENCY MEDICINE

## 2022-01-01 PROCEDURE — 36415 COLL VENOUS BLD VENIPUNCTURE: CPT

## 2022-01-01 PROCEDURE — 0B9F7ZX DRAINAGE OF RIGHT LOWER LUNG LOBE, VIA NATURAL OR ARTIFICIAL OPENING, DIAGNOSTIC: ICD-10-PCS | Performed by: INTERNAL MEDICINE

## 2022-01-01 PROCEDURE — 81001 URINALYSIS AUTO W/SCOPE: CPT | Performed by: NURSE PRACTITIONER

## 2022-01-01 PROCEDURE — 80053 COMPREHEN METABOLIC PANEL: CPT | Performed by: NURSE PRACTITIONER

## 2022-01-01 PROCEDURE — 80143 DRUG ASSAY ACETAMINOPHEN: CPT | Performed by: NURSE PRACTITIONER

## 2022-01-01 PROCEDURE — 31624 DX BRONCHOSCOPE/LAVAGE: CPT | Performed by: INTERNAL MEDICINE

## 2022-01-01 PROCEDURE — 87640 STAPH A DNA AMP PROBE: CPT | Performed by: HOSPITALIST

## 2022-01-01 PROCEDURE — 87147 CULTURE TYPE IMMUNOLOGIC: CPT | Performed by: NURSE PRACTITIONER

## 2022-01-01 PROCEDURE — 83605 ASSAY OF LACTIC ACID: CPT | Performed by: NURSE PRACTITIONER

## 2022-01-01 PROCEDURE — 83690 ASSAY OF LIPASE: CPT | Performed by: NURSE PRACTITIONER

## 2022-01-01 PROCEDURE — 74018 RADEX ABDOMEN 1 VIEW: CPT

## 2022-01-01 PROCEDURE — 02HV33Z INSERTION OF INFUSION DEVICE INTO SUPERIOR VENA CAVA, PERCUTANEOUS APPROACH: ICD-10-PCS | Performed by: SURGERY

## 2022-01-01 RX ORDER — ACETAMINOPHEN 650 MG/1
650 SUPPOSITORY RECTAL EVERY 4 HOURS PRN
Status: DISCONTINUED | OUTPATIENT
Start: 2022-01-01 | End: 2022-01-01 | Stop reason: HOSPADM

## 2022-01-01 RX ORDER — THIAMINE HYDROCHLORIDE 100 MG/ML
200 INJECTION, SOLUTION INTRAMUSCULAR; INTRAVENOUS DAILY
Status: DISCONTINUED | OUTPATIENT
Start: 2022-01-01 | End: 2022-01-01

## 2022-01-01 RX ORDER — SODIUM CHLORIDE 0.9 % (FLUSH) 0.9 %
10 SYRINGE (ML) INJECTION AS NEEDED
Status: DISCONTINUED | OUTPATIENT
Start: 2022-01-01 | End: 2022-01-01 | Stop reason: HOSPADM

## 2022-01-01 RX ORDER — LORAZEPAM 2 MG/1
4 TABLET ORAL
Status: DISCONTINUED | OUTPATIENT
Start: 2022-01-01 | End: 2022-01-01

## 2022-01-01 RX ORDER — CHLORDIAZEPOXIDE HYDROCHLORIDE 25 MG/1
50 CAPSULE, GELATIN COATED ORAL ONCE
Status: COMPLETED | OUTPATIENT
Start: 2022-01-01 | End: 2022-01-01

## 2022-01-01 RX ORDER — LORAZEPAM 2 MG/1
2 TABLET ORAL
Status: DISCONTINUED | OUTPATIENT
Start: 2022-01-01 | End: 2022-01-01

## 2022-01-01 RX ORDER — LACTULOSE 10 G/15ML
30 SOLUTION ORAL DAILY
Status: DISPENSED | OUTPATIENT
Start: 2022-01-01 | End: 2022-01-01

## 2022-01-01 RX ORDER — HYDROCODONE BITARTRATE AND ACETAMINOPHEN 5; 325 MG/1; MG/1
1 TABLET ORAL EVERY 6 HOURS PRN
Status: DISCONTINUED | OUTPATIENT
Start: 2022-01-01 | End: 2022-01-01

## 2022-01-01 RX ORDER — CLINDAMYCIN PHOSPHATE 600 MG/50ML
600 INJECTION INTRAVENOUS ONCE
Status: COMPLETED | OUTPATIENT
Start: 2022-01-01 | End: 2022-01-01

## 2022-01-01 RX ORDER — DEXTROSE MONOHYDRATE 50 MG/ML
100 INJECTION, SOLUTION INTRAVENOUS CONTINUOUS
Status: DISCONTINUED | OUTPATIENT
Start: 2022-01-01 | End: 2022-01-01

## 2022-01-01 RX ORDER — POTASSIUM CHLORIDE 20 MEQ/1
40 TABLET, EXTENDED RELEASE ORAL ONCE
Status: DISCONTINUED | OUTPATIENT
Start: 2022-01-01 | End: 2022-01-01

## 2022-01-01 RX ORDER — SODIUM CHLORIDE 0.9 % (FLUSH) 0.9 %
10 SYRINGE (ML) INJECTION EVERY 12 HOURS SCHEDULED
Status: DISCONTINUED | OUTPATIENT
Start: 2022-01-01 | End: 2022-01-01

## 2022-01-01 RX ORDER — POTASSIUM CHLORIDE 7.45 MG/ML
10 INJECTION INTRAVENOUS
Status: DISCONTINUED | OUTPATIENT
Start: 2022-01-01 | End: 2022-01-01

## 2022-01-01 RX ORDER — FENTANYL CITRATE 50 UG/ML
50 INJECTION, SOLUTION INTRAMUSCULAR; INTRAVENOUS
Status: DISCONTINUED | OUTPATIENT
Start: 2022-01-01 | End: 2022-01-01

## 2022-01-01 RX ORDER — NICOTINE POLACRILEX 4 MG
15 LOZENGE BUCCAL
Status: DISCONTINUED | OUTPATIENT
Start: 2022-01-01 | End: 2022-01-01 | Stop reason: SDUPTHER

## 2022-01-01 RX ORDER — NOREPINEPHRINE BIT/0.9 % NACL 8 MG/250ML
.02-.3 INFUSION BOTTLE (ML) INTRAVENOUS
Status: DISCONTINUED | OUTPATIENT
Start: 2022-01-01 | End: 2022-01-01

## 2022-01-01 RX ORDER — MAGNESIUM SULFATE HEPTAHYDRATE 40 MG/ML
4 INJECTION, SOLUTION INTRAVENOUS ONCE
Status: COMPLETED | OUTPATIENT
Start: 2022-01-01 | End: 2022-01-01

## 2022-01-01 RX ORDER — PROPOFOL 10 MG/ML
VIAL (ML) INTRAVENOUS
Status: DISCONTINUED
Start: 2022-01-01 | End: 2022-01-01 | Stop reason: HOSPADM

## 2022-01-01 RX ORDER — LACTULOSE 10 G/15ML
30 SOLUTION ORAL DAILY
Status: COMPLETED | OUTPATIENT
Start: 2022-01-01 | End: 2022-01-01

## 2022-01-01 RX ORDER — LORAZEPAM 2 MG/ML
2 INJECTION INTRAMUSCULAR ONCE
Status: COMPLETED | OUTPATIENT
Start: 2022-01-01 | End: 2022-01-01

## 2022-01-01 RX ORDER — POLYETHYLENE GLYCOL 3350 17 G/17G
17 POWDER, FOR SOLUTION ORAL DAILY
Status: DISCONTINUED | OUTPATIENT
Start: 2022-01-01 | End: 2022-01-01

## 2022-01-01 RX ORDER — MAGNESIUM SULFATE HEPTAHYDRATE 40 MG/ML
4 INJECTION, SOLUTION INTRAVENOUS AS NEEDED
Status: DISCONTINUED | OUTPATIENT
Start: 2022-01-01 | End: 2022-01-01

## 2022-01-01 RX ORDER — BISACODYL 10 MG
10 SUPPOSITORY, RECTAL RECTAL DAILY
Status: DISCONTINUED | OUTPATIENT
Start: 2022-01-01 | End: 2022-01-01

## 2022-01-01 RX ORDER — POTASSIUM CHLORIDE 7.45 MG/ML
10 INJECTION INTRAVENOUS
Status: COMPLETED | OUTPATIENT
Start: 2022-01-01 | End: 2022-01-01

## 2022-01-01 RX ORDER — POTASSIUM CHLORIDE 1.5 G/1.77G
40 POWDER, FOR SOLUTION ORAL ONCE
Status: DISCONTINUED | OUTPATIENT
Start: 2022-01-01 | End: 2022-01-01

## 2022-01-01 RX ORDER — BISACODYL 10 MG
10 SUPPOSITORY, RECTAL RECTAL ONCE
Status: COMPLETED | OUTPATIENT
Start: 2022-01-01 | End: 2022-01-01

## 2022-01-01 RX ORDER — DEXTROSE MONOHYDRATE 25 G/50ML
25 INJECTION, SOLUTION INTRAVENOUS
Status: DISCONTINUED | OUTPATIENT
Start: 2022-01-01 | End: 2022-01-01 | Stop reason: HOSPADM

## 2022-01-01 RX ORDER — LORAZEPAM 2 MG/ML
2 INJECTION INTRAMUSCULAR
Status: DISCONTINUED | OUTPATIENT
Start: 2022-01-01 | End: 2022-01-01

## 2022-01-01 RX ORDER — SODIUM CHLORIDE 0.9 % (FLUSH) 0.9 %
10 SYRINGE (ML) INJECTION AS NEEDED
Status: DISCONTINUED | OUTPATIENT
Start: 2022-01-01 | End: 2022-01-01

## 2022-01-01 RX ORDER — LORAZEPAM 1 MG/1
1 TABLET ORAL EVERY 8 HOURS
Status: DISCONTINUED | OUTPATIENT
Start: 2022-01-01 | End: 2022-01-01

## 2022-01-01 RX ORDER — LORAZEPAM 2 MG/ML
1 INJECTION INTRAMUSCULAR
Status: DISCONTINUED | OUTPATIENT
Start: 2022-01-01 | End: 2022-01-01

## 2022-01-01 RX ORDER — SODIUM CHLORIDE 9 MG/ML
125 INJECTION, SOLUTION INTRAVENOUS CONTINUOUS
Status: DISCONTINUED | OUTPATIENT
Start: 2022-01-01 | End: 2022-01-01

## 2022-01-01 RX ORDER — DEXTROSE, SODIUM CHLORIDE, AND POTASSIUM CHLORIDE 5; .2; .15 G/100ML; G/100ML; G/100ML
100 INJECTION INTRAVENOUS CONTINUOUS
Status: DISCONTINUED | OUTPATIENT
Start: 2022-01-01 | End: 2022-01-01

## 2022-01-01 RX ORDER — PANTOPRAZOLE SODIUM 40 MG/10ML
40 INJECTION, POWDER, LYOPHILIZED, FOR SOLUTION INTRAVENOUS
Status: DISCONTINUED | OUTPATIENT
Start: 2022-01-01 | End: 2022-01-01

## 2022-01-01 RX ORDER — MIDODRINE HYDROCHLORIDE 2.5 MG/1
10 TABLET ORAL
Status: DISCONTINUED | OUTPATIENT
Start: 2022-01-01 | End: 2022-01-01

## 2022-01-01 RX ORDER — POTASSIUM CHLORIDE 1.5 G/1.77G
40 POWDER, FOR SOLUTION ORAL ONCE
Status: COMPLETED | OUTPATIENT
Start: 2022-01-01 | End: 2022-01-01

## 2022-01-01 RX ORDER — LORAZEPAM 2 MG/ML
1 INJECTION INTRAMUSCULAR EVERY 4 HOURS
Status: DISCONTINUED | OUTPATIENT
Start: 2022-01-01 | End: 2022-01-01 | Stop reason: HOSPADM

## 2022-01-01 RX ORDER — LORAZEPAM 2 MG/ML
4 INJECTION INTRAMUSCULAR
Status: DISCONTINUED | OUTPATIENT
Start: 2022-01-01 | End: 2022-01-01

## 2022-01-01 RX ORDER — LORAZEPAM 1 MG/1
1 TABLET ORAL
Status: DISCONTINUED | OUTPATIENT
Start: 2022-01-01 | End: 2022-01-01

## 2022-01-01 RX ORDER — DIPHENOXYLATE HYDROCHLORIDE AND ATROPINE SULFATE 2.5; .025 MG/1; MG/1
1 TABLET ORAL DAILY
Status: DISCONTINUED | OUTPATIENT
Start: 2022-01-01 | End: 2022-01-01

## 2022-01-01 RX ORDER — DEXTROSE MONOHYDRATE 25 G/50ML
25 INJECTION, SOLUTION INTRAVENOUS
Status: DISCONTINUED | OUTPATIENT
Start: 2022-01-01 | End: 2022-01-01 | Stop reason: SDUPTHER

## 2022-01-01 RX ORDER — POTASSIUM CHLORIDE 20 MEQ/1
40 TABLET, EXTENDED RELEASE ORAL AS NEEDED
Status: DISCONTINUED | OUTPATIENT
Start: 2022-01-01 | End: 2022-01-01

## 2022-01-01 RX ORDER — MAGNESIUM SULFATE HEPTAHYDRATE 40 MG/ML
2 INJECTION, SOLUTION INTRAVENOUS AS NEEDED
Status: DISCONTINUED | OUTPATIENT
Start: 2022-01-01 | End: 2022-01-01

## 2022-01-01 RX ORDER — FENTANYL CITRATE/PF 100MCG/2ML
SYRINGE (ML) INTRAVENOUS
Status: DISCONTINUED | OUTPATIENT
Start: 2022-01-01 | End: 2022-01-01

## 2022-01-01 RX ORDER — CHLORHEXIDINE GLUCONATE 0.12 MG/ML
15 RINSE ORAL EVERY 12 HOURS SCHEDULED
Status: DISCONTINUED | OUTPATIENT
Start: 2022-01-01 | End: 2022-01-01 | Stop reason: HOSPADM

## 2022-01-01 RX ORDER — HYDRALAZINE HYDROCHLORIDE 20 MG/ML
10 INJECTION INTRAMUSCULAR; INTRAVENOUS EVERY 6 HOURS PRN
Status: DISCONTINUED | OUTPATIENT
Start: 2022-01-01 | End: 2022-01-01

## 2022-01-01 RX ORDER — POTASSIUM CHLORIDE 1.5 G/1.77G
40 POWDER, FOR SOLUTION ORAL EVERY 4 HOURS
Status: COMPLETED | OUTPATIENT
Start: 2022-01-01 | End: 2022-01-01

## 2022-01-01 RX ORDER — LORAZEPAM 2 MG/ML
1 INJECTION INTRAMUSCULAR ONCE
Status: COMPLETED | OUTPATIENT
Start: 2022-01-01 | End: 2022-01-01

## 2022-01-01 RX ORDER — NICOTINE POLACRILEX 4 MG
15 LOZENGE BUCCAL
Status: DISCONTINUED | OUTPATIENT
Start: 2022-01-01 | End: 2022-01-01 | Stop reason: HOSPADM

## 2022-01-01 RX ORDER — POTASSIUM CHLORIDE 1.5 G/1.77G
40 POWDER, FOR SOLUTION ORAL AS NEEDED
Status: DISCONTINUED | OUTPATIENT
Start: 2022-01-01 | End: 2022-01-01

## 2022-01-01 RX ORDER — FOLIC ACID 1 MG/1
1 TABLET ORAL DAILY
Status: DISCONTINUED | OUTPATIENT
Start: 2022-01-01 | End: 2022-01-01

## 2022-01-01 RX ADMIN — LORAZEPAM 2 MG: 2 INJECTION INTRAMUSCULAR; INTRAVENOUS at 20:49

## 2022-01-01 RX ADMIN — INSULIN DETEMIR 15 UNITS: 100 INJECTION, SOLUTION SUBCUTANEOUS at 14:09

## 2022-01-01 RX ADMIN — INSULIN ASPART 3 UNITS: 100 INJECTION, SOLUTION INTRAVENOUS; SUBCUTANEOUS at 07:29

## 2022-01-01 RX ADMIN — POTASSIUM CHLORIDE 10 MEQ: 7.46 INJECTION, SOLUTION INTRAVENOUS at 11:39

## 2022-01-01 RX ADMIN — INSULIN DETEMIR 15 UNITS: 100 INJECTION, SOLUTION SUBCUTANEOUS at 08:51

## 2022-01-01 RX ADMIN — SODIUM CHLORIDE, PRESERVATIVE FREE 10 ML: 5 INJECTION INTRAVENOUS at 08:40

## 2022-01-01 RX ADMIN — POTASSIUM PHOSPHATE, MONOBASIC AND POTASSIUM PHOSPHATE, DIBASIC 45 MMOL: 224; 236 INJECTION, SOLUTION, CONCENTRATE INTRAVENOUS at 03:11

## 2022-01-01 RX ADMIN — SODIUM CHLORIDE, PRESERVATIVE FREE 10 ML: 5 INJECTION INTRAVENOUS at 20:00

## 2022-01-01 RX ADMIN — Medication 1 TABLET: at 08:40

## 2022-01-01 RX ADMIN — VANCOMYCIN HYDROCHLORIDE 1500 MG: 5 INJECTION, POWDER, LYOPHILIZED, FOR SOLUTION INTRAVENOUS at 14:17

## 2022-01-01 RX ADMIN — SODIUM BICARBONATE 75 ML/HR: 84 INJECTION, SOLUTION INTRAVENOUS at 14:54

## 2022-01-01 RX ADMIN — PIPERACILLIN SODIUM AND TAZOBACTAM SODIUM 3.38 G: 3; .375 INJECTION, POWDER, LYOPHILIZED, FOR SOLUTION INTRAVENOUS at 13:36

## 2022-01-01 RX ADMIN — Medication 1 TABLET: at 09:27

## 2022-01-01 RX ADMIN — FOLIC ACID 1 MG: 5 INJECTION, SOLUTION INTRAMUSCULAR; INTRAVENOUS; SUBCUTANEOUS at 08:31

## 2022-01-01 RX ADMIN — SODIUM CHLORIDE, PRESERVATIVE FREE 10 ML: 5 INJECTION INTRAVENOUS at 09:03

## 2022-01-01 RX ADMIN — FENTANYL CITRATE: 50 INJECTION, SOLUTION INTRAMUSCULAR; INTRAVENOUS at 18:08

## 2022-01-01 RX ADMIN — SODIUM CHLORIDE, PRESERVATIVE FREE 10 ML: 5 INJECTION INTRAVENOUS at 20:45

## 2022-01-01 RX ADMIN — LORAZEPAM 2 MG: 2 INJECTION INTRAMUSCULAR; INTRAVENOUS at 13:01

## 2022-01-01 RX ADMIN — POTASSIUM CHLORIDE 40 MEQ: 1.5 POWDER, FOR SOLUTION ORAL at 05:30

## 2022-01-01 RX ADMIN — LORAZEPAM 2 MG: 2 INJECTION INTRAMUSCULAR; INTRAVENOUS at 22:23

## 2022-01-01 RX ADMIN — METRONIDAZOLE 500 MG: 500 INJECTION, SOLUTION INTRAVENOUS at 22:30

## 2022-01-01 RX ADMIN — VANCOMYCIN HYDROCHLORIDE 1500 MG: 5 INJECTION, POWDER, LYOPHILIZED, FOR SOLUTION INTRAVENOUS at 16:25

## 2022-01-01 RX ADMIN — PROPOFOL 25 MCG/KG/MIN: 10 INJECTION, EMULSION INTRAVENOUS at 22:41

## 2022-01-01 RX ADMIN — THIAMINE HYDROCHLORIDE 200 MG: 100 INJECTION, SOLUTION INTRAMUSCULAR; INTRAVENOUS at 08:13

## 2022-01-01 RX ADMIN — LORAZEPAM 2 MG: 2 INJECTION, SOLUTION INTRAMUSCULAR; INTRAVENOUS at 18:31

## 2022-01-01 RX ADMIN — ASCORBIC ACID, VITAMIN A PALMITATE, CHOLECALCIFEROL, THIAMINE HYDROCHLORIDE, RIBOFLAVIN-5 PHOSPHATE SODIUM, PYRIDOXINE HYDROCHLORIDE, NIACINAMIDE, DEXPANTHENOL, ALPHA-TOCOPHEROL ACETATE, VITAMIN K1, FOLIC ACID, BIOTIN, CYANOCOBALAMIN: 200; 3300; 200; 6; 3.6; 6; 40; 15; 10; 150; 600; 60; 5 INJECTION, SOLUTION INTRAVENOUS at 08:32

## 2022-01-01 RX ADMIN — NOREPINEPHRINE BITARTRATE 0.02 MCG/KG/MIN: 1 INJECTION, SOLUTION, CONCENTRATE INTRAVENOUS at 11:23

## 2022-01-01 RX ADMIN — ASCORBIC ACID, VITAMIN A PALMITATE, CHOLECALCIFEROL, THIAMINE HYDROCHLORIDE, RIBOFLAVIN-5 PHOSPHATE SODIUM, PYRIDOXINE HYDROCHLORIDE, NIACINAMIDE, DEXPANTHENOL, ALPHA-TOCOPHEROL ACETATE, VITAMIN K1, FOLIC ACID, BIOTIN, CYANOCOBALAMIN: 200; 3300; 200; 6; 3.6; 6; 40; 15; 10; 150; 600; 60; 5 INJECTION, SOLUTION INTRAVENOUS at 08:37

## 2022-01-01 RX ADMIN — THIAMINE HYDROCHLORIDE 200 MG: 100 INJECTION, SOLUTION INTRAMUSCULAR; INTRAVENOUS at 08:37

## 2022-01-01 RX ADMIN — SODIUM CHLORIDE, PRESERVATIVE FREE 10 ML: 5 INJECTION INTRAVENOUS at 08:05

## 2022-01-01 RX ADMIN — DEXTROSE MONOHYDRATE 100 ML/HR: 50 INJECTION, SOLUTION INTRAVENOUS at 11:06

## 2022-01-01 RX ADMIN — POTASSIUM CHLORIDE: 149 INJECTION, SOLUTION, CONCENTRATE INTRAVENOUS at 16:25

## 2022-01-01 RX ADMIN — POTASSIUM CHLORIDE 40 MEQ: 1.5 POWDER, FOR SOLUTION ORAL at 09:43

## 2022-01-01 RX ADMIN — SODIUM CHLORIDE, PRESERVATIVE FREE 10 ML: 5 INJECTION INTRAVENOUS at 08:53

## 2022-01-01 RX ADMIN — SODIUM CHLORIDE, PRESERVATIVE FREE 10 ML: 5 INJECTION INTRAVENOUS at 08:15

## 2022-01-01 RX ADMIN — POTASSIUM CHLORIDE 10 MEQ: 7.46 INJECTION, SOLUTION INTRAVENOUS at 06:29

## 2022-01-01 RX ADMIN — ENOXAPARIN SODIUM 40 MG: 40 INJECTION SUBCUTANEOUS at 18:00

## 2022-01-01 RX ADMIN — INSULIN ASPART 2 UNITS: 100 INJECTION, SOLUTION INTRAVENOUS; SUBCUTANEOUS at 17:53

## 2022-01-01 RX ADMIN — LORAZEPAM 2 MG: 2 INJECTION INTRAMUSCULAR; INTRAVENOUS at 22:31

## 2022-01-01 RX ADMIN — LORAZEPAM 2 MG: 2 INJECTION, SOLUTION INTRAMUSCULAR; INTRAVENOUS at 12:38

## 2022-01-01 RX ADMIN — SODIUM CHLORIDE, PRESERVATIVE FREE 10 ML: 5 INJECTION INTRAVENOUS at 08:25

## 2022-01-01 RX ADMIN — FOLIC ACID 1 MG: 5 INJECTION, SOLUTION INTRAMUSCULAR; INTRAVENOUS; SUBCUTANEOUS at 09:51

## 2022-01-01 RX ADMIN — INSULIN ASPART 3 UNITS: 100 INJECTION, SOLUTION INTRAVENOUS; SUBCUTANEOUS at 08:36

## 2022-01-01 RX ADMIN — CHLORHEXIDINE GLUCONATE 15 ML: 1.2 RINSE ORAL at 08:07

## 2022-01-01 RX ADMIN — FOLIC ACID 1 MG: 5 INJECTION, SOLUTION INTRAMUSCULAR; INTRAVENOUS; SUBCUTANEOUS at 09:56

## 2022-01-01 RX ADMIN — FOLIC ACID 1 MG: 1 TABLET ORAL at 08:55

## 2022-01-01 RX ADMIN — SODIUM CHLORIDE 125 ML/HR: 9 INJECTION, SOLUTION INTRAVENOUS at 02:34

## 2022-01-01 RX ADMIN — CHLORHEXIDINE GLUCONATE 15 ML: 1.2 RINSE ORAL at 09:43

## 2022-01-01 RX ADMIN — CHLORDIAZEPOXIDE HYDROCHLORIDE 50 MG: 25 CAPSULE ORAL at 22:32

## 2022-01-01 RX ADMIN — THIAMINE HYDROCHLORIDE 200 MG: 100 INJECTION, SOLUTION INTRAMUSCULAR; INTRAVENOUS at 09:38

## 2022-01-01 RX ADMIN — PIPERACILLIN SODIUM AND TAZOBACTAM SODIUM 3.38 G: 3; .375 INJECTION, POWDER, LYOPHILIZED, FOR SOLUTION INTRAVENOUS at 20:05

## 2022-01-01 RX ADMIN — BISACODYL 10 MG: 10 SUPPOSITORY RECTAL at 09:41

## 2022-01-01 RX ADMIN — FENTANYL CITRATE: 50 INJECTION, SOLUTION INTRAMUSCULAR; INTRAVENOUS at 03:38

## 2022-01-01 RX ADMIN — INSULIN DETEMIR 15 UNITS: 100 INJECTION, SOLUTION SUBCUTANEOUS at 09:44

## 2022-01-01 RX ADMIN — INSULIN ASPART 2 UNITS: 100 INJECTION, SOLUTION INTRAVENOUS; SUBCUTANEOUS at 08:39

## 2022-01-01 RX ADMIN — INSULIN DETEMIR 15 UNITS: 100 INJECTION, SOLUTION SUBCUTANEOUS at 08:55

## 2022-01-01 RX ADMIN — CHLORHEXIDINE GLUCONATE 15 ML: 1.2 RINSE ORAL at 09:29

## 2022-01-01 RX ADMIN — PROPOFOL 35 MCG/KG/MIN: 10 INJECTION, EMULSION INTRAVENOUS at 21:16

## 2022-01-01 RX ADMIN — LORAZEPAM 2 MG: 2 INJECTION, SOLUTION INTRAMUSCULAR; INTRAVENOUS at 01:52

## 2022-01-01 RX ADMIN — SODIUM CHLORIDE, PRESERVATIVE FREE 10 ML: 5 INJECTION INTRAVENOUS at 21:16

## 2022-01-01 RX ADMIN — SODIUM CHLORIDE, PRESERVATIVE FREE 10 ML: 5 INJECTION INTRAVENOUS at 08:36

## 2022-01-01 RX ADMIN — FENTANYL CITRATE: 50 INJECTION, SOLUTION INTRAMUSCULAR; INTRAVENOUS at 13:16

## 2022-01-01 RX ADMIN — LORAZEPAM 2 MG: 2 INJECTION, SOLUTION INTRAMUSCULAR; INTRAVENOUS at 16:38

## 2022-01-01 RX ADMIN — POTASSIUM CHLORIDE 10 MEQ: 7.46 INJECTION, SOLUTION INTRAVENOUS at 05:36

## 2022-01-01 RX ADMIN — FOLIC ACID 1 MG: 5 INJECTION, SOLUTION INTRAMUSCULAR; INTRAVENOUS; SUBCUTANEOUS at 08:00

## 2022-01-01 RX ADMIN — LORAZEPAM 2 MG: 2 INJECTION INTRAMUSCULAR; INTRAVENOUS at 23:20

## 2022-01-01 RX ADMIN — SODIUM CHLORIDE, PRESERVATIVE FREE 10 ML: 5 INJECTION INTRAVENOUS at 20:26

## 2022-01-01 RX ADMIN — POLYETHYLENE GLYCOL 3350 17 G: 17 POWDER, FOR SOLUTION ORAL at 09:42

## 2022-01-01 RX ADMIN — LORAZEPAM 2 MG: 2 INJECTION INTRAMUSCULAR; INTRAVENOUS at 07:30

## 2022-01-01 RX ADMIN — METRONIDAZOLE 500 MG: 500 INJECTION, SOLUTION INTRAVENOUS at 14:46

## 2022-01-01 RX ADMIN — Medication 100 MG: at 12:44

## 2022-01-01 RX ADMIN — Medication 100 MG: at 11:13

## 2022-01-01 RX ADMIN — SODIUM CHLORIDE, PRESERVATIVE FREE 10 ML: 5 INJECTION INTRAVENOUS at 09:43

## 2022-01-01 RX ADMIN — POTASSIUM CHLORIDE 40 MEQ: 1.5 POWDER, FOR SOLUTION ORAL at 20:25

## 2022-01-01 RX ADMIN — SODIUM CHLORIDE 2 G: 9 INJECTION, SOLUTION INTRAVENOUS at 14:26

## 2022-01-01 RX ADMIN — METRONIDAZOLE 500 MG: 500 INJECTION, SOLUTION INTRAVENOUS at 06:28

## 2022-01-01 RX ADMIN — SODIUM CHLORIDE, PRESERVATIVE FREE 10 ML: 5 INJECTION INTRAVENOUS at 20:56

## 2022-01-01 RX ADMIN — SODIUM CHLORIDE 2 G: 9 INJECTION, SOLUTION INTRAVENOUS at 15:23

## 2022-01-01 RX ADMIN — INSULIN ASPART 2 UNITS: 100 INJECTION, SOLUTION INTRAVENOUS; SUBCUTANEOUS at 17:13

## 2022-01-01 RX ADMIN — POTASSIUM CHLORIDE 10 MEQ: 7.46 INJECTION, SOLUTION INTRAVENOUS at 10:26

## 2022-01-01 RX ADMIN — LORAZEPAM 2 MG: 2 INJECTION INTRAMUSCULAR; INTRAVENOUS at 10:04

## 2022-01-01 RX ADMIN — POTASSIUM CHLORIDE 10 MEQ: 7.46 INJECTION, SOLUTION INTRAVENOUS at 12:43

## 2022-01-01 RX ADMIN — INSULIN DETEMIR 15 UNITS: 100 INJECTION, SOLUTION SUBCUTANEOUS at 09:27

## 2022-01-01 RX ADMIN — METRONIDAZOLE 500 MG: 500 INJECTION, SOLUTION INTRAVENOUS at 06:06

## 2022-01-01 RX ADMIN — SODIUM CHLORIDE, PRESERVATIVE FREE 10 ML: 5 INJECTION INTRAVENOUS at 20:39

## 2022-01-01 RX ADMIN — SODIUM CHLORIDE, PRESERVATIVE FREE 10 ML: 5 INJECTION INTRAVENOUS at 09:02

## 2022-01-01 RX ADMIN — SODIUM CHLORIDE, PRESERVATIVE FREE 10 ML: 5 INJECTION INTRAVENOUS at 20:44

## 2022-01-01 RX ADMIN — SODIUM CHLORIDE, PRESERVATIVE FREE 10 ML: 5 INJECTION INTRAVENOUS at 21:17

## 2022-01-01 RX ADMIN — ENOXAPARIN SODIUM 40 MG: 40 INJECTION SUBCUTANEOUS at 17:45

## 2022-01-01 RX ADMIN — INSULIN ASPART 3 UNITS: 100 INJECTION, SOLUTION INTRAVENOUS; SUBCUTANEOUS at 23:13

## 2022-01-01 RX ADMIN — PANTOPRAZOLE SODIUM 40 MG: 40 INJECTION, POWDER, FOR SOLUTION INTRAVENOUS at 06:08

## 2022-01-01 RX ADMIN — HYDRALAZINE HYDROCHLORIDE 10 MG: 20 INJECTION INTRAMUSCULAR; INTRAVENOUS at 21:26

## 2022-01-01 RX ADMIN — SODIUM CHLORIDE 2 G: 9 INJECTION, SOLUTION INTRAVENOUS at 12:59

## 2022-01-01 RX ADMIN — INSULIN ASPART 4 UNITS: 100 INJECTION, SOLUTION INTRAVENOUS; SUBCUTANEOUS at 17:59

## 2022-01-01 RX ADMIN — LORAZEPAM 1 MG: 2 INJECTION INTRAMUSCULAR; INTRAVENOUS at 06:24

## 2022-01-01 RX ADMIN — THIAMINE HYDROCHLORIDE 200 MG: 100 INJECTION, SOLUTION INTRAMUSCULAR; INTRAVENOUS at 09:52

## 2022-01-01 RX ADMIN — METRONIDAZOLE 500 MG: 500 INJECTION, SOLUTION INTRAVENOUS at 15:34

## 2022-01-01 RX ADMIN — INSULIN ASPART 4 UNITS: 100 INJECTION, SOLUTION INTRAVENOUS; SUBCUTANEOUS at 12:44

## 2022-01-01 RX ADMIN — ASCORBIC ACID, VITAMIN A PALMITATE, CHOLECALCIFEROL, THIAMINE HYDROCHLORIDE, RIBOFLAVIN-5 PHOSPHATE SODIUM, PYRIDOXINE HYDROCHLORIDE, NIACINAMIDE, DEXPANTHENOL, ALPHA-TOCOPHEROL ACETATE, VITAMIN K1, FOLIC ACID, BIOTIN, CYANOCOBALAMIN: 200; 3300; 200; 6; 3.6; 6; 40; 15; 10; 150; 600; 60; 5 INJECTION, SOLUTION INTRAVENOUS at 08:03

## 2022-01-01 RX ADMIN — POTASSIUM CHLORIDE 10 MEQ: 7.46 INJECTION, SOLUTION INTRAVENOUS at 14:42

## 2022-01-01 RX ADMIN — LORAZEPAM 1 MG: 2 INJECTION INTRAMUSCULAR; INTRAVENOUS at 00:12

## 2022-01-01 RX ADMIN — POTASSIUM CHLORIDE 10 MEQ: 7.46 INJECTION, SOLUTION INTRAVENOUS at 09:01

## 2022-01-01 RX ADMIN — METRONIDAZOLE 500 MG: 500 INJECTION, SOLUTION INTRAVENOUS at 06:14

## 2022-01-01 RX ADMIN — SODIUM CHLORIDE 2 G: 9 INJECTION, SOLUTION INTRAVENOUS at 14:33

## 2022-01-01 RX ADMIN — SODIUM CHLORIDE, PRESERVATIVE FREE 10 ML: 5 INJECTION INTRAVENOUS at 08:37

## 2022-01-01 RX ADMIN — LORAZEPAM 2 MG: 2 INJECTION INTRAMUSCULAR; INTRAVENOUS at 05:17

## 2022-01-01 RX ADMIN — CHLORHEXIDINE GLUCONATE 15 ML: 1.2 RINSE ORAL at 20:44

## 2022-01-01 RX ADMIN — CLINDAMYCIN IN 5 PERCENT DEXTROSE 600 MG: 12 INJECTION, SOLUTION INTRAVENOUS at 09:53

## 2022-01-01 RX ADMIN — FOLIC ACID 1 MG: 1 TABLET ORAL at 11:13

## 2022-01-01 RX ADMIN — POTASSIUM CHLORIDE 10 MEQ: 7.46 INJECTION, SOLUTION INTRAVENOUS at 07:30

## 2022-01-01 RX ADMIN — INSULIN ASPART 3 UNITS: 100 INJECTION, SOLUTION INTRAVENOUS; SUBCUTANEOUS at 18:08

## 2022-01-01 RX ADMIN — METRONIDAZOLE 500 MG: 500 INJECTION, SOLUTION INTRAVENOUS at 06:04

## 2022-01-01 RX ADMIN — PROPOFOL 20 MCG/KG/MIN: 10 INJECTION, EMULSION INTRAVENOUS at 15:01

## 2022-01-01 RX ADMIN — SODIUM CHLORIDE, PRESERVATIVE FREE 10 ML: 5 INJECTION INTRAVENOUS at 20:49

## 2022-01-01 RX ADMIN — LACTULOSE 30 G: 20 SOLUTION ORAL at 09:42

## 2022-01-01 RX ADMIN — POTASSIUM PHOSPHATE, MONOBASIC AND POTASSIUM PHOSPHATE, DIBASIC 45 MMOL: 224; 236 INJECTION, SOLUTION, CONCENTRATE INTRAVENOUS at 04:38

## 2022-01-01 RX ADMIN — LORAZEPAM 2 MG: 2 INJECTION, SOLUTION INTRAMUSCULAR; INTRAVENOUS at 01:57

## 2022-01-01 RX ADMIN — INSULIN ASPART 3 UNITS: 100 INJECTION, SOLUTION INTRAVENOUS; SUBCUTANEOUS at 00:21

## 2022-01-01 RX ADMIN — INSULIN DETEMIR 15 UNITS: 100 INJECTION, SOLUTION SUBCUTANEOUS at 08:42

## 2022-01-01 RX ADMIN — ENOXAPARIN SODIUM 40 MG: 40 INJECTION SUBCUTANEOUS at 19:29

## 2022-01-01 RX ADMIN — FOLIC ACID 1 MG: 1 TABLET ORAL at 09:27

## 2022-01-01 RX ADMIN — SODIUM CHLORIDE, PRESERVATIVE FREE 10 ML: 5 INJECTION INTRAVENOUS at 21:10

## 2022-01-01 RX ADMIN — METRONIDAZOLE 500 MG: 500 INJECTION, SOLUTION INTRAVENOUS at 22:40

## 2022-01-01 RX ADMIN — PIPERACILLIN SODIUM AND TAZOBACTAM SODIUM 3.38 G: 3; .375 INJECTION, POWDER, LYOPHILIZED, FOR SOLUTION INTRAVENOUS at 20:01

## 2022-01-01 RX ADMIN — ASCORBIC ACID, VITAMIN A PALMITATE, CHOLECALCIFEROL, THIAMINE HYDROCHLORIDE, RIBOFLAVIN-5 PHOSPHATE SODIUM, PYRIDOXINE HYDROCHLORIDE, NIACINAMIDE, DEXPANTHENOL, ALPHA-TOCOPHEROL ACETATE, VITAMIN K1, FOLIC ACID, BIOTIN, CYANOCOBALAMIN: 200; 3300; 200; 6; 3.6; 6; 40; 15; 10; 150; 600; 60; 5 INJECTION, SOLUTION INTRAVENOUS at 09:52

## 2022-01-01 RX ADMIN — SODIUM CHLORIDE 2 G: 9 INJECTION, SOLUTION INTRAVENOUS at 14:46

## 2022-01-01 RX ADMIN — CHLORHEXIDINE GLUCONATE 15 ML: 1.2 RINSE ORAL at 20:26

## 2022-01-01 RX ADMIN — LORAZEPAM 1 MG: 2 INJECTION INTRAMUSCULAR; INTRAVENOUS at 11:17

## 2022-01-01 RX ADMIN — CHLORHEXIDINE GLUCONATE 15 ML: 1.2 RINSE ORAL at 20:49

## 2022-01-01 RX ADMIN — SODIUM CHLORIDE, PRESERVATIVE FREE 10 ML: 5 INJECTION INTRAVENOUS at 08:54

## 2022-01-01 RX ADMIN — POLYETHYLENE GLYCOL 3350 17 G: 17 POWDER, FOR SOLUTION ORAL at 08:25

## 2022-01-01 RX ADMIN — CHLORHEXIDINE GLUCONATE 15 ML: 1.2 RINSE ORAL at 08:10

## 2022-01-01 RX ADMIN — METRONIDAZOLE 500 MG: 500 INJECTION, SOLUTION INTRAVENOUS at 15:23

## 2022-01-01 RX ADMIN — CHLORHEXIDINE GLUCONATE 15 ML: 1.2 RINSE ORAL at 08:39

## 2022-01-01 RX ADMIN — CARBIDOPA AND LEVODOPA 10 MG: 50; 200 TABLET, EXTENDED RELEASE ORAL at 06:30

## 2022-01-01 RX ADMIN — ENOXAPARIN SODIUM 40 MG: 40 INJECTION SUBCUTANEOUS at 17:55

## 2022-01-01 RX ADMIN — POTASSIUM CHLORIDE 10 MEQ: 7.46 INJECTION, SOLUTION INTRAVENOUS at 23:24

## 2022-01-01 RX ADMIN — INSULIN ASPART 3 UNITS: 100 INJECTION, SOLUTION INTRAVENOUS; SUBCUTANEOUS at 06:08

## 2022-01-01 RX ADMIN — CHLORHEXIDINE GLUCONATE 15 ML: 1.2 RINSE ORAL at 09:06

## 2022-01-01 RX ADMIN — BISACODYL 10 MG: 10 SUPPOSITORY RECTAL at 09:52

## 2022-01-01 RX ADMIN — FOLIC ACID 1 MG: 5 INJECTION, SOLUTION INTRAMUSCULAR; INTRAVENOUS; SUBCUTANEOUS at 08:36

## 2022-01-01 RX ADMIN — SODIUM CHLORIDE, PRESERVATIVE FREE 10 ML: 5 INJECTION INTRAVENOUS at 08:03

## 2022-01-01 RX ADMIN — POTASSIUM CHLORIDE 10 MEQ: 7.46 INJECTION, SOLUTION INTRAVENOUS at 21:12

## 2022-01-01 RX ADMIN — SODIUM CHLORIDE, PRESERVATIVE FREE 10 ML: 5 INJECTION INTRAVENOUS at 20:50

## 2022-01-01 RX ADMIN — HYDROCODONE BITARTRATE AND ACETAMINOPHEN 1 TABLET: 5; 325 TABLET ORAL at 05:20

## 2022-01-01 RX ADMIN — LORAZEPAM 2 MG: 2 INJECTION INTRAMUSCULAR; INTRAVENOUS at 06:40

## 2022-01-01 RX ADMIN — PROPOFOL 25 MCG/KG/MIN: 10 INJECTION, EMULSION INTRAVENOUS at 03:37

## 2022-01-01 RX ADMIN — CHLORHEXIDINE GLUCONATE 15 ML: 1.2 RINSE ORAL at 01:41

## 2022-01-01 RX ADMIN — METRONIDAZOLE 500 MG: 500 INJECTION, SOLUTION INTRAVENOUS at 06:22

## 2022-01-01 RX ADMIN — SODIUM CHLORIDE 2 G: 9 INJECTION, SOLUTION INTRAVENOUS at 10:51

## 2022-01-01 RX ADMIN — HYDRALAZINE HYDROCHLORIDE 10 MG: 20 INJECTION INTRAMUSCULAR; INTRAVENOUS at 09:27

## 2022-01-01 RX ADMIN — METRONIDAZOLE 500 MG: 500 INJECTION, SOLUTION INTRAVENOUS at 14:21

## 2022-01-01 RX ADMIN — INSULIN ASPART 5 UNITS: 100 INJECTION, SOLUTION INTRAVENOUS; SUBCUTANEOUS at 00:05

## 2022-01-01 RX ADMIN — INSULIN ASPART 3 UNITS: 100 INJECTION, SOLUTION INTRAVENOUS; SUBCUTANEOUS at 17:55

## 2022-01-01 RX ADMIN — PANTOPRAZOLE SODIUM 40 MG: 40 INJECTION, POWDER, FOR SOLUTION INTRAVENOUS at 06:04

## 2022-01-01 RX ADMIN — POTASSIUM CHLORIDE 10 MEQ: 7.46 INJECTION, SOLUTION INTRAVENOUS at 22:19

## 2022-01-01 RX ADMIN — Medication 1 TABLET: at 11:13

## 2022-01-01 RX ADMIN — INSULIN ASPART 2 UNITS: 100 INJECTION, SOLUTION INTRAVENOUS; SUBCUTANEOUS at 17:55

## 2022-01-01 RX ADMIN — INSULIN ASPART 3 UNITS: 100 INJECTION, SOLUTION INTRAVENOUS; SUBCUTANEOUS at 00:13

## 2022-01-01 RX ADMIN — CHLORHEXIDINE GLUCONATE 15 ML: 1.2 RINSE ORAL at 20:57

## 2022-01-01 RX ADMIN — HYDRALAZINE HYDROCHLORIDE 10 MG: 20 INJECTION INTRAMUSCULAR; INTRAVENOUS at 06:09

## 2022-01-01 RX ADMIN — PANTOPRAZOLE SODIUM 40 MG: 40 INJECTION, POWDER, FOR SOLUTION INTRAVENOUS at 05:46

## 2022-01-01 RX ADMIN — FENTANYL CITRATE: 50 INJECTION, SOLUTION INTRAMUSCULAR; INTRAVENOUS at 16:35

## 2022-01-01 RX ADMIN — Medication: at 12:40

## 2022-01-01 RX ADMIN — METRONIDAZOLE 500 MG: 500 INJECTION, SOLUTION INTRAVENOUS at 01:16

## 2022-01-01 RX ADMIN — LORAZEPAM 1 MG: 2 INJECTION, SOLUTION INTRAMUSCULAR; INTRAVENOUS at 00:25

## 2022-01-01 RX ADMIN — PANTOPRAZOLE SODIUM 40 MG: 40 INJECTION, POWDER, FOR SOLUTION INTRAVENOUS at 05:20

## 2022-01-01 RX ADMIN — SODIUM CHLORIDE, PRESERVATIVE FREE 10 ML: 5 INJECTION INTRAVENOUS at 08:14

## 2022-01-01 RX ADMIN — VANCOMYCIN HYDROCHLORIDE 1500 MG: 5 INJECTION, POWDER, LYOPHILIZED, FOR SOLUTION INTRAVENOUS at 15:34

## 2022-01-01 RX ADMIN — PROPOFOL 25 MCG/KG/MIN: 10 INJECTION, EMULSION INTRAVENOUS at 14:46

## 2022-01-01 RX ADMIN — INSULIN ASPART 2 UNITS: 100 INJECTION, SOLUTION INTRAVENOUS; SUBCUTANEOUS at 20:15

## 2022-01-01 RX ADMIN — INSULIN ASPART 3 UNITS: 100 INJECTION, SOLUTION INTRAVENOUS; SUBCUTANEOUS at 11:08

## 2022-01-01 RX ADMIN — DEXTROSE MONOHYDRATE 100 ML/HR: 50 INJECTION, SOLUTION INTRAVENOUS at 17:36

## 2022-01-01 RX ADMIN — SODIUM CHLORIDE 2 G: 9 INJECTION, SOLUTION INTRAVENOUS at 15:26

## 2022-01-01 RX ADMIN — INSULIN ASPART 3 UNITS: 100 INJECTION, SOLUTION INTRAVENOUS; SUBCUTANEOUS at 05:20

## 2022-01-01 RX ADMIN — METRONIDAZOLE 500 MG: 500 INJECTION, SOLUTION INTRAVENOUS at 14:54

## 2022-01-01 RX ADMIN — SODIUM CHLORIDE 125 ML/HR: 9 INJECTION, SOLUTION INTRAVENOUS at 08:24

## 2022-01-01 RX ADMIN — SODIUM CHLORIDE 1000 ML: 9 INJECTION, SOLUTION INTRAVENOUS at 22:31

## 2022-01-01 RX ADMIN — HYDROCODONE BITARTRATE AND ACETAMINOPHEN 1 TABLET: 5; 325 TABLET ORAL at 21:44

## 2022-01-01 RX ADMIN — INSULIN ASPART 5 UNITS: 100 INJECTION, SOLUTION INTRAVENOUS; SUBCUTANEOUS at 05:46

## 2022-01-01 RX ADMIN — LORAZEPAM 2 MG: 2 INJECTION INTRAMUSCULAR; INTRAVENOUS at 12:46

## 2022-01-01 RX ADMIN — DEXTROSE MONOHYDRATE 75 ML/HR: 50 INJECTION, SOLUTION INTRAVENOUS at 20:45

## 2022-01-01 RX ADMIN — Medication 100 MG: at 11:40

## 2022-01-01 RX ADMIN — HYDRALAZINE HYDROCHLORIDE 10 MG: 20 INJECTION INTRAMUSCULAR; INTRAVENOUS at 21:10

## 2022-01-01 RX ADMIN — CHLORHEXIDINE GLUCONATE 15 ML: 1.2 RINSE ORAL at 21:10

## 2022-01-01 RX ADMIN — POTASSIUM CHLORIDE 40 MEQ: 1.5 POWDER, FOR SOLUTION ORAL at 01:40

## 2022-01-01 RX ADMIN — INSULIN DETEMIR 15 UNITS: 100 INJECTION, SOLUTION SUBCUTANEOUS at 08:36

## 2022-01-01 RX ADMIN — BISACODYL 10 MG: 10 SUPPOSITORY RECTAL at 08:25

## 2022-01-01 RX ADMIN — FOLIC ACID 1 MG: 5 INJECTION, SOLUTION INTRAMUSCULAR; INTRAVENOUS; SUBCUTANEOUS at 08:53

## 2022-01-01 RX ADMIN — INSULIN DETEMIR 15 UNITS: 100 INJECTION, SOLUTION SUBCUTANEOUS at 08:07

## 2022-01-01 RX ADMIN — LORAZEPAM 2 MG: 2 INJECTION, SOLUTION INTRAMUSCULAR; INTRAVENOUS at 14:09

## 2022-01-01 RX ADMIN — PROPOFOL 20 MCG/KG/MIN: 10 INJECTION, EMULSION INTRAVENOUS at 07:44

## 2022-01-01 RX ADMIN — DEXTROSE MONOHYDRATE 100 ML/HR: 50 INJECTION, SOLUTION INTRAVENOUS at 03:21

## 2022-01-01 RX ADMIN — Medication 100 MG: at 08:52

## 2022-01-01 RX ADMIN — Medication 1 TABLET: at 08:52

## 2022-01-01 RX ADMIN — LORAZEPAM 2 MG: 2 INJECTION INTRAMUSCULAR; INTRAVENOUS at 16:40

## 2022-01-01 RX ADMIN — CHLORHEXIDINE GLUCONATE 15 ML: 1.2 RINSE ORAL at 08:52

## 2022-01-01 RX ADMIN — SODIUM CHLORIDE, PRESERVATIVE FREE 10 ML: 5 INJECTION INTRAVENOUS at 09:29

## 2022-01-01 RX ADMIN — Medication 100 MG: at 08:40

## 2022-01-01 RX ADMIN — CARBIDOPA AND LEVODOPA 10 MG: 50; 200 TABLET, EXTENDED RELEASE ORAL at 08:40

## 2022-01-01 RX ADMIN — SODIUM CHLORIDE 125 ML/HR: 9 INJECTION, SOLUTION INTRAVENOUS at 03:26

## 2022-01-01 RX ADMIN — SODIUM CHLORIDE, PRESERVATIVE FREE 10 ML: 5 INJECTION INTRAVENOUS at 20:40

## 2022-01-01 RX ADMIN — CHLORDIAZEPOXIDE HYDROCHLORIDE 50 MG: 25 CAPSULE ORAL at 02:17

## 2022-01-01 RX ADMIN — LORAZEPAM 2 MG: 2 INJECTION INTRAMUSCULAR; INTRAVENOUS at 08:24

## 2022-01-01 RX ADMIN — Medication 1 TABLET: at 08:07

## 2022-01-01 RX ADMIN — PROPOFOL 20 MCG/KG/MIN: 10 INJECTION, EMULSION INTRAVENOUS at 01:20

## 2022-01-01 RX ADMIN — INSULIN DETEMIR 15 UNITS: 100 INJECTION, SOLUTION SUBCUTANEOUS at 08:10

## 2022-01-01 RX ADMIN — Medication 1 TABLET: at 08:55

## 2022-01-01 RX ADMIN — LORAZEPAM 2 MG: 2 INJECTION INTRAMUSCULAR; INTRAVENOUS at 21:24

## 2022-01-01 RX ADMIN — ENOXAPARIN SODIUM 40 MG: 40 INJECTION SUBCUTANEOUS at 17:59

## 2022-01-01 RX ADMIN — ENOXAPARIN SODIUM 30 MG: 30 INJECTION SUBCUTANEOUS at 18:16

## 2022-01-01 RX ADMIN — SODIUM CHLORIDE 2 G: 9 INJECTION, SOLUTION INTRAVENOUS at 14:54

## 2022-01-01 RX ADMIN — POTASSIUM CHLORIDE 10 MEQ: 7.46 INJECTION, SOLUTION INTRAVENOUS at 14:08

## 2022-01-01 RX ADMIN — POTASSIUM CHLORIDE 10 MEQ: 7.46 INJECTION, SOLUTION INTRAVENOUS at 15:38

## 2022-01-01 RX ADMIN — PIPERACILLIN SODIUM AND TAZOBACTAM SODIUM 3.38 G: 3; .375 INJECTION, POWDER, LYOPHILIZED, FOR SOLUTION INTRAVENOUS at 12:41

## 2022-01-01 RX ADMIN — INSULIN ASPART 3 UNITS: 100 INJECTION, SOLUTION INTRAVENOUS; SUBCUTANEOUS at 17:58

## 2022-01-01 RX ADMIN — FOLIC ACID 1 MG: 1 TABLET ORAL at 08:07

## 2022-01-01 RX ADMIN — PIPERACILLIN SODIUM AND TAZOBACTAM SODIUM 3.38 G: 3; .375 INJECTION, POWDER, LYOPHILIZED, FOR SOLUTION INTRAVENOUS at 03:26

## 2022-01-01 RX ADMIN — THIAMINE HYDROCHLORIDE 200 MG: 100 INJECTION, SOLUTION INTRAMUSCULAR; INTRAVENOUS at 08:01

## 2022-01-01 RX ADMIN — PIPERACILLIN SODIUM AND TAZOBACTAM SODIUM 3.38 G: 3; .375 INJECTION, POWDER, LYOPHILIZED, FOR SOLUTION INTRAVENOUS at 03:22

## 2022-01-01 RX ADMIN — PANTOPRAZOLE SODIUM 40 MG: 40 INJECTION, POWDER, FOR SOLUTION INTRAVENOUS at 05:35

## 2022-01-01 RX ADMIN — NOREPINEPHRINE BITARTRATE 0.04 MCG/KG/MIN: 1 INJECTION, SOLUTION, CONCENTRATE INTRAVENOUS at 04:44

## 2022-01-01 RX ADMIN — MAGNESIUM SULFATE HEPTAHYDRATE 4 G: 40 INJECTION, SOLUTION INTRAVENOUS at 03:22

## 2022-01-01 RX ADMIN — VANCOMYCIN HYDROCHLORIDE 1500 MG: 5 INJECTION, POWDER, LYOPHILIZED, FOR SOLUTION INTRAVENOUS at 13:01

## 2022-01-01 RX ADMIN — CHLORHEXIDINE GLUCONATE 15 ML: 1.2 RINSE ORAL at 08:55

## 2022-01-01 RX ADMIN — Medication 100 MG: at 08:55

## 2022-01-01 RX ADMIN — POTASSIUM PHOSPHATE, MONOBASIC AND POTASSIUM PHOSPHATE, DIBASIC 30 MMOL: 224; 236 INJECTION, SOLUTION, CONCENTRATE INTRAVENOUS at 12:42

## 2022-01-01 RX ADMIN — INSULIN ASPART 3 UNITS: 100 INJECTION, SOLUTION INTRAVENOUS; SUBCUTANEOUS at 14:17

## 2022-01-01 RX ADMIN — CHLORHEXIDINE GLUCONATE 15 ML: 1.2 RINSE ORAL at 21:20

## 2022-01-01 RX ADMIN — PANTOPRAZOLE SODIUM 40 MG: 40 INJECTION, POWDER, FOR SOLUTION INTRAVENOUS at 14:16

## 2022-01-01 RX ADMIN — PIPERACILLIN SODIUM AND TAZOBACTAM SODIUM 3.38 G: 3; .375 INJECTION, POWDER, LYOPHILIZED, FOR SOLUTION INTRAVENOUS at 03:11

## 2022-01-01 RX ADMIN — ACETAMINOPHEN 650 MG: 650 SUPPOSITORY RECTAL at 10:14

## 2022-01-01 RX ADMIN — LORAZEPAM 2 MG: 2 INJECTION INTRAMUSCULAR; INTRAVENOUS at 20:00

## 2022-01-01 RX ADMIN — PIPERACILLIN SODIUM AND TAZOBACTAM SODIUM 3.38 G: 3; .375 INJECTION, POWDER, LYOPHILIZED, FOR SOLUTION INTRAVENOUS at 20:50

## 2022-01-01 RX ADMIN — CARBIDOPA AND LEVODOPA 10 MG: 50; 200 TABLET, EXTENDED RELEASE ORAL at 17:44

## 2022-01-01 RX ADMIN — FOLIC ACID 1 MG: 1 TABLET ORAL at 08:52

## 2022-01-01 RX ADMIN — FOLIC ACID 1 MG: 1 TABLET ORAL at 08:40

## 2022-01-01 RX ADMIN — ENOXAPARIN SODIUM 40 MG: 40 INJECTION SUBCUTANEOUS at 17:22

## 2022-01-01 RX ADMIN — POTASSIUM CHLORIDE 10 MEQ: 7.46 INJECTION, SOLUTION INTRAVENOUS at 20:01

## 2022-01-01 RX ADMIN — INSULIN ASPART 3 UNITS: 100 INJECTION, SOLUTION INTRAVENOUS; SUBCUTANEOUS at 11:40

## 2022-01-01 RX ADMIN — DEXTROSE MONOHYDRATE 100 ML/HR: 50 INJECTION, SOLUTION INTRAVENOUS at 04:22

## 2022-01-01 RX ADMIN — CHLORHEXIDINE GLUCONATE 15 ML: 1.2 RINSE ORAL at 20:40

## 2022-01-01 RX ADMIN — LORAZEPAM 2 MG: 2 INJECTION INTRAMUSCULAR; INTRAVENOUS at 17:19

## 2022-01-01 RX ADMIN — INSULIN ASPART 2 UNITS: 100 INJECTION, SOLUTION INTRAVENOUS; SUBCUTANEOUS at 00:29

## 2022-01-01 RX ADMIN — SODIUM CHLORIDE, PRESERVATIVE FREE 10 ML: 5 INJECTION INTRAVENOUS at 09:27

## 2022-01-01 RX ADMIN — METRONIDAZOLE 500 MG: 500 INJECTION, SOLUTION INTRAVENOUS at 22:27

## 2022-01-01 RX ADMIN — THIAMINE HYDROCHLORIDE 200 MG: 100 INJECTION, SOLUTION INTRAMUSCULAR; INTRAVENOUS at 08:29

## 2022-01-01 RX ADMIN — MAGNESIUM SULFATE HEPTAHYDRATE 4 G: 40 INJECTION, SOLUTION INTRAVENOUS at 05:36

## 2022-01-01 RX ADMIN — INSULIN ASPART 3 UNITS: 100 INJECTION, SOLUTION INTRAVENOUS; SUBCUTANEOUS at 12:51

## 2022-01-01 RX ADMIN — INSULIN ASPART 4 UNITS: 100 INJECTION, SOLUTION INTRAVENOUS; SUBCUTANEOUS at 08:34

## 2022-01-01 RX ADMIN — PIPERACILLIN SODIUM AND TAZOBACTAM SODIUM 3.38 G: 3; .375 INJECTION, POWDER, LYOPHILIZED, FOR SOLUTION INTRAVENOUS at 12:46

## 2022-01-01 RX ADMIN — LORAZEPAM 1 MG: 2 INJECTION INTRAMUSCULAR; INTRAVENOUS at 21:20

## 2022-01-01 RX ADMIN — PANTOPRAZOLE SODIUM 40 MG: 40 INJECTION, POWDER, FOR SOLUTION INTRAVENOUS at 06:28

## 2022-01-01 RX ADMIN — LORAZEPAM 1 MG: 2 INJECTION INTRAMUSCULAR; INTRAVENOUS at 04:10

## 2022-01-01 RX ADMIN — INSULIN ASPART 3 UNITS: 100 INJECTION, SOLUTION INTRAVENOUS; SUBCUTANEOUS at 17:46

## 2022-01-01 RX ADMIN — SODIUM CHLORIDE 1000 ML: 9 INJECTION, SOLUTION INTRAVENOUS at 00:25

## 2022-01-01 RX ADMIN — PANTOPRAZOLE SODIUM 40 MG: 40 INJECTION, POWDER, FOR SOLUTION INTRAVENOUS at 05:22

## 2022-01-01 RX ADMIN — SODIUM CHLORIDE, PRESERVATIVE FREE 10 ML: 5 INJECTION INTRAVENOUS at 20:05

## 2022-01-01 RX ADMIN — METRONIDAZOLE 500 MG: 500 INJECTION, SOLUTION INTRAVENOUS at 23:07

## 2022-01-01 RX ADMIN — ASCORBIC ACID, VITAMIN A PALMITATE, CHOLECALCIFEROL, THIAMINE HYDROCHLORIDE, RIBOFLAVIN-5 PHOSPHATE SODIUM, PYRIDOXINE HYDROCHLORIDE, NIACINAMIDE, DEXPANTHENOL, ALPHA-TOCOPHEROL ACETATE, VITAMIN K1, FOLIC ACID, BIOTIN, CYANOCOBALAMIN: 200; 3300; 200; 6; 3.6; 6; 40; 15; 10; 150; 600; 60; 5 INJECTION, SOLUTION INTRAVENOUS at 09:38

## 2022-01-01 RX ADMIN — THIAMINE HYDROCHLORIDE 1000 ML/HR: 100 INJECTION, SOLUTION INTRAMUSCULAR; INTRAVENOUS at 19:10

## 2022-01-01 RX ADMIN — INSULIN ASPART 2 UNITS: 100 INJECTION, SOLUTION INTRAVENOUS; SUBCUTANEOUS at 05:22

## 2022-01-01 RX ADMIN — LORAZEPAM 2 MG: 2 INJECTION INTRAMUSCULAR; INTRAVENOUS at 07:24

## 2022-01-01 RX ADMIN — SODIUM CHLORIDE 2 G: 9 INJECTION, SOLUTION INTRAVENOUS at 14:16

## 2022-01-01 RX ADMIN — PROPOFOL 45 MCG/KG/MIN: 10 INJECTION, EMULSION INTRAVENOUS at 16:24

## 2022-01-04 NOTE — ED PROVIDER NOTES
Subjective     Fall  Mechanism of injury: fall    Injury location:  Face and head/neck  Head/neck injury location:  Head  Facial injury location:  Face  Incident location:  Home  Fall:     Fall occurred:  Tripped    Impact surface:  Hard floor    Point of impact:  Unable to specify  Suspicion of alcohol use: yes    Suspicion of drug use: no    Prior to arrival data:     Loss of consciousness: no    Associated symptoms: vomiting    Associated symptoms: no abdominal pain, no blindness, no difficulty breathing, no hearing loss and no nausea    Risk factors: no anticoagulation therapy, no beta blocker therapy, no CHF, no diabetes, no kidney disease, no past MI and not pregnant    Risk factors comment:  Alcoholism      Review of Systems   Constitutional: Negative.    HENT: Negative.  Negative for hearing loss.    Eyes: Negative.  Negative for blindness.   Respiratory: Negative.    Cardiovascular: Negative.    Gastrointestinal: Positive for vomiting. Negative for abdominal pain and nausea.   Endocrine: Negative.    Genitourinary: Negative.    Musculoskeletal: Negative.    Skin: Negative.    Allergic/Immunologic: Negative.    Neurological: Negative.    Hematological: Negative.    Psychiatric/Behavioral: Negative.        Past Medical History:   Diagnosis Date   • Alcohol abuse    • Alcoholism (HCC)        No Known Allergies    Past Surgical History:   Procedure Laterality Date   • WISDOM TOOTH EXTRACTION         History reviewed. No pertinent family history.    Social History     Socioeconomic History   • Marital status: Single   Substance and Sexual Activity   • Alcohol use: Yes     Alcohol/week: 12.0 standard drinks     Types: 12 Shots of liquor per week   • Drug use: Yes     Types: Other     Comment: ETOH   • Sexual activity: Defer           Objective   Physical Exam  Vitals and nursing note reviewed.   Constitutional:       Appearance: He is well-developed.   HENT:      Head: Normocephalic.      Right Ear: External ear  normal.      Left Ear: External ear normal.   Eyes:      Conjunctiva/sclera: Conjunctivae normal.      Pupils: Pupils are equal, round, and reactive to light.   Cardiovascular:      Rate and Rhythm: Normal rate and regular rhythm.      Heart sounds: Normal heart sounds.   Pulmonary:      Effort: Pulmonary effort is normal.      Breath sounds: Normal breath sounds.   Abdominal:      General: Bowel sounds are normal.      Palpations: Abdomen is soft.   Musculoskeletal:         General: Normal range of motion.      Cervical back: Normal range of motion and neck supple.   Skin:     General: Skin is warm and dry.      Capillary Refill: Capillary refill takes less than 2 seconds.   Neurological:      Mental Status: He is alert and oriented to person, place, and time.   Psychiatric:         Behavior: Behavior normal.         Thought Content: Thought content normal.         Procedures           ED Course  ED Course as of 01/06/22 0731   Tue Jan 04, 2022   1820 EKG noted sinus tachycardia.  190 bpm.  .  QRS 74.  QTc 517.  Possible anterior infarct of indeterminate age.  No acute ST elevation. [SF]   2139 Intake has spoken with patient as he requests help with alcohol. Report they are awaiting 2nd lactic acid and when back they will speak with psychiatrist   [DELIO]   2202 Endorsed to Hema Leavitt PA-C [DELIO]   2227 Patient was evaluated by intake services. Patient appears to be going through alcohol withdrawal symptoms. Patient has not received any medication other than a banana bag. Patient was started on Librium and Ativan. Pending intake evaluation [RB]   Wed Jan 05, 2022   0133 CT facial bones rad interpreted:  IMPRESSION:  No evidence of facial bone fracture. However, significant periapical lucency seen on the lower left and the mandible, correlate with visual inspection of the mouth, this is not related to trauma. [RB]   0136 Patient appears to be in active withdrawal of alcohol.  Patient is confused disoriented  moderate tremors.  Called intake nurse to discuss with Dr. Shirley treatment regiment to best facilitate recovery. [RB]   0153 Dr. Shirley suggested go ahead and give the patient another 50 of Librium and another 2 of Ativan.  Contacted Dr. Angeles for possible admission. [RB]   0243 Discussed with Dr. Angeles who is agreeable to place patient in the unit for treatment of alcohol withdrawal. [RB]   0423 CT chest rad interpreted:  Negative CT of the chest.    [RB]   0423 CT abd pelvis rad interpreted:  Diffuse fatty change of liver. Otherwise negative CT abdomen pelvis [RB]      ED Course User Index  [DELIO] Filipe Leavitt APRN  [RB] Hema Leavitt II, PA  [SF] Priyank Rodriguez DO                Electronically signed by ANI Lackey, 01/04/22, 10:02 PM EST.                                   MDM  Number of Diagnoses or Management Options  Alcohol withdrawal delirium (HCC): new and requires workup  Contusion of face, initial encounter: new and requires workup     Amount and/or Complexity of Data Reviewed  Clinical lab tests: ordered and reviewed  Tests in the radiology section of CPT®: ordered and reviewed  Discuss the patient with other providers: yes    Risk of Complications, Morbidity, and/or Mortality  Presenting problems: moderate  Diagnostic procedures: moderate  Management options: moderate    Patient Progress  Patient progress: stable      Final diagnoses:   Contusion of face, initial encounter   Alcohol withdrawal delirium (HCC)       ED Disposition  ED Disposition     ED Disposition Condition Comment    Decision to Admit  Level of Care: Critical Care [6]   Diagnosis: Alcohol withdrawal (HCC) [291.81.ICD-9-CM]   Admitting Physician: LAMAR ANGELES [[id number]]   Attending Physician: LAMAR NAGELES [[id number]]   Certification: I Certify That Inpatient Hospital Services Are Medically Necessary For Greater Than 2 Midnights            No follow-up provider specified.       Medication List      No  changes were made to your prescriptions during this visit.          Hema Leavitt II, PA  01/05/22 0157       Hema Leavitt II, PA  01/06/22 2285

## 2022-01-05 PROBLEM — F10.939 ALCOHOL WITHDRAWAL: Status: ACTIVE | Noted: 2022-01-01

## 2022-01-05 PROBLEM — S00.83XA CONTUSION OF FACE: Status: ACTIVE | Noted: 2022-01-01

## 2022-01-05 NOTE — NURSING NOTE
Reassessed patient. Patients current ciwa 21. ED provider aware. Medication administered per ED nurse. Will reassess in 30 minutes for improvement.

## 2022-01-05 NOTE — PLAN OF CARE
Goal Outcome Evaluation:  Plan of Care Reviewed With: patient           Outcome Summary: VSS. RA. Disoriented times 4.  Pt. resting comfortably. No signs of distress at this time.

## 2022-01-05 NOTE — PLAN OF CARE
Goal Outcome Evaluation:  Plan of Care Reviewed With: patient        Progress: no change  Outcome Summary: VSS, on RA, CIWA 11-15 every Check this shift, ativan given 4x. Resting quietly at this time.WCTM.

## 2022-01-05 NOTE — PROGRESS NOTES
Patient seen and examined, patient is very drowsy, unable to follow commands, eyes are closed tremors noted, attempted to examine the mouth with tongue depressor failed, thorough exam physically noted patient is multiple bruises and contusions especially both lower extremity worse on the left, there is also ulcers of the bony prominence probably from trauma, there is cellulitis of the right leg, hematoma of the left foot, pedal pulses strong bilateral.  Review of CT scan of the abdomen revealed constipation, CT of head?  Lucency of the left mandible, failed to examine physically due to patient's unable to cooperate, there is no facial symmetry.  There is no joint deformity of the mandibles there is no hypermobility or abnormal movement/mobility on exam.    -Acute alcohol withdrawal with delirium tremens  -Alcohol abuse  -Mild thrombocytopenia secondary to alcohol abuse  -Sepsis present admission source likely the cellulitis lower extremity  -Mild rhabdomyolysis likely from trauma  -Multiple abrasion contusion and hematoma from fall  -Diabetes with A1c of more than 7, with hyperglycemia, new diagnosis  -Acute hypokalemia hypomagnesemia and hypophosphatemia  -QTC prolongation    Follow-up cultures, will start antibiotic to include coverage for cellulitis, will hold off for vancomycin, will check MRSA screening first and depending on results will adjust antibiotic, supportive care, aspiration precaution, address constipation.  Fall precaution, benzodiazepines per CIWA protocol, Precedex if needed.  P.o. for now hydration.  High-dose thiamine, folate and multivitamins.

## 2022-01-05 NOTE — ED NOTES
Called intake and talked with MICHAEL Sandoval. She states that they will be over to eval patient.      Candida Sloan RN  01/04/22 2011

## 2022-01-05 NOTE — ED NOTES
Patient dad, Jose Armando Reich, called requesting update.   He left phone number of [phone]     Candida Sloan RN  01/04/22 2006

## 2022-01-05 NOTE — NURSING NOTE
Patient is requesting detox from ETOH. He reports drinking at least 12 shots of vodka a day for the last 30 years with his last drink being yesterday 1/3/21. He reports he fell around 6 am while intoxicated and wasn't able to get up. It was reported his father found him covered in urine and called EMS. Per patient he has been drinking nonstop for the last month. He denies si, hi, and avh. He reports a hx of previous detox admissions around 20 years ago. He denies any other substance use. He rates anxiety 7/10 and depression 3/10. ciwa 13.

## 2022-01-05 NOTE — PROGRESS NOTES
Kinetics :   Vancomycin  Day 1    The patient has been evaluated for vancomycin therapy for sepsis - bacteremia.  We will load the patient with vancomycin 1500mg x 1 and follow with 1500mg q 24 hrs to maximize the auc/trough level projections and monitor with you.        The zosyn was started as 3.375 gm q 8 hrs as the extended infusion protocol.

## 2022-01-05 NOTE — NURSING NOTE
MASD noted to bilat gluteals with areas of excoriation.  Will treat with Z-Guard and leave open to air.    Dry, scab covered abrasions to bilat elbows.  [place] wound red and blanchable.  No drainage.  Multiple dry, scab covered abrasions to right anterior leg and medial ankle.  Diabetic ulcer to his right lateral ankle.  Callused edges.  No drainage.  No erythema. Will leave these wounds open to air.    PI prevention orders initiated.       01/05/22 1405   Wound 01/05/22 0532 Left posterior elbow Abrasion   Placement Date/Time: 01/05/22 0532   Side: Left  Orientation: posterior  Location: elbow  Primary Wound Type: Abrasion  Additional Comments: scabbed and long in length   Dressing Appearance open to air   Closure None   Base dry; scab   Periwound redness; blanchable   Drainage Amount none   Wound 01/05/22 0533 Right posterior elbow Abrasion   Placement Date/Time: 01/05/22 0533   Side: Right  Orientation: posterior  Location: elbow  Primary Wound Type: Abrasion   Dressing Appearance open to air   Closure None   Base dry; scab   Periwound redness; blanchable   Drainage Amount none   Wound 01/05/22 1400 Right lower leg Abrasion   Placement Date/Time: 01/05/22 1400   Present on Hospital Admission: Yes  Side: Right  Orientation: lower  Location: leg  Primary Wound Type: (c) Abrasion   Wound Image   (see photos under media tab)   Dressing Appearance open to air   Base dry; scab   Periwound intact   Drainage Amount none   Wound 01/05/22 1400 Right lateral ankle Diabetic Ulcer   Placement Date/Time: 01/05/22 1400   Present on Hospital Admission: Yes  Side: Right  Orientation: lateral  Location: ankle  Primary Wound Type: Diabetic Ulcer   Wound Image   (see photo under media tab)   Dressing Appearance open to air   Closure None   Base dry; pink   Periwound dry   Edges callused   Drainage Amount none   Wound 01/05/22 1400 Right medial ankle Abrasion   Placement Date/Time: 01/05/22 1400   Present on Hospital Admission: Yes   Side: Right  Orientation: medial  Location: ankle  Primary Wound Type: Abrasion   Wound Image   (see photo under media tab)   Dressing Appearance open to air   Closure None   Base dry; scab   Periwound intact   Drainage Amount none   Wound 01/05/22 1400 Bilateral gluteal MASD (Moisture associated skin damage)   Placement Date/Time: 01/05/22 1400   Present on Hospital Admission: Yes  Side: Bilateral  Location: gluteal  Primary Wound Type: MASD (Moisture associated skin damage)   Wound Image   (see photo under media tab)   Dressing Appearance open to air   Closure None   Base red   Periwound excoriated   Drainage Amount none

## 2022-01-05 NOTE — CASE MANAGEMENT/SOCIAL WORK
Discharge Planning Assessment  [facility]     Patient Name: Jose Armando Reich  MRN: [id number]  Today's Date: 1/5/2022    Admit Date: 1/4/2022     Discharge Needs Assessment     Row Name 01/05/22 1425       Living Environment    Lives With parent(s)    Name(s) of Who Lives With Patient Jose Armando Reich    Current Living Arrangements home/apartment/condo    Primary Care Provided by self    Provides Primary Care For no one    Family Caregiver if Needed parent(s)    Family Caregiver Names Jose Armando    Quality of Family Relationships helpful; involved; supportive    Able to Return to Prior Arrangements yes       Resource/Environmental Concerns    Resource/Environmental Concerns none       Transition Planning    Patient/Family Anticipates Transition to home with family    Patient/Family Anticipated Services at Transition mental health services    Transportation Anticipated family or friend will provide       Discharge Needs Assessment    Equipment Currently Used at Home none    Concerns to be Addressed substance/tobacco abuse/use    Anticipated Changes Related to Illness none    Equipment Needed After Discharge none    Current Discharge Risk substance use/abuse               Discharge Plan     Row Name 01/05/22 1428       Plan    Plan SS spoke with pt's father, Jose Armando Reich pt is still disoriented. Pt lives at home with father. Pt does not utilize home health services or DME. Pt does not have a PCP. No POA or living will on file. Pt will likely need psych consult when alert, oriented, and medically stable. SS to follow up with pt regarding alcohol abuse resources, otherwise home at discharge. SS to follow and assist.    Patient/Family in Agreement with Plan yes               Demographic Summary     Row Name 01/05/22 142       General Information    Referral Source nursing    Reason for Consult --  CCU admit              WILLIAM Tyson

## 2022-01-05 NOTE — PAYOR COMM NOTE
"[facility]  NPI: [id number]    Utilization Review   Contact:Meka Reynaga MSN, APRN, NP-C  Phone: [phone]  Fax: [phone]    passport/ Attn: nurse review  Inpatient auth request and notification  REF#[id number]  DX: F10.239, [id number]    Cleo Archer (56 y.o. Male)             Date of Birth Social Security Number Address Home Phone MRN    1965  [address] [place] [id number] [phone] [id number]    Caodaism Marital Status             None Single       Admission Date Admission Type Admitting Provider Attending Provider Department, Room/Bed    22 Emergency Alberto Angeles MD Oculam, Claire Chin, MD [facility] CRITICAL CARE, [facility]/    Discharge Date Discharge Disposition Discharge Destination                         Attending Provider: Hannah Pulido MD    Allergies: No Known Allergies    Isolation: None   Infection: COVID (rule out) (22)   Code Status: CPR   Advance Care Planning Activity    Ht: 190.5 cm (75\")   Wt: 73.9 kg (162 lb 14.4 oz)    Admission Cmt: None   Principal Problem: None                Active Insurance as of 2022     Primary Coverage     Payor Plan Insurance Group Employer/Plan Group    [facility] BY [employer] [facility] BY GM [id number]     Payor Plan Address Payor Plan Phone Number Payor Plan Fax Number Effective Dates    [address]   2021 - None Entered    [place] [id number]       Subscriber Name Subscriber Birth Date Member ID       CLEO ARCHER 1965 [id number]                 Emergency Contacts      (Rel.) Home Phone Work Phone Mobile Phone    CLEO ARCHER (Father) [phone] -- --               History & Physical      Alberto Angeles MD at 22 0601          Hospitalist History and Physical        Patient Identification  Name: Cleo Archer  Age/Sex: 56 y.o. male  :  1965        MRN: [id number]  Visit Number: [id number]  Admit Date: 2022 "   PCP: Provider, No Known        Chief complaint found in the floor after fall, trying to detox from alcohol    History of Present Illness:  Patient is a 56 y.o. male with reported history of alcoholism who was found in the floor by his father on 1/4/2022. He drinks at least 12 shots of vodka daily and has done so for the last 30 years. He reportedly fell around 6 am on 1/3/21 while intoxicated and could not get up on his own. This was the last time he had any alcohol. His father found him covered in urine and called EMS. Patient requested detox from alcohol upon arrival to the ED around 5pm on 1/4/21. Over the course of his ED stay, it became more apparent that he was starting to go through active withdrawal, with tremors, tachycardia and confusion. He was administered both valium and ativan per psychiatry recommendations. His withdrawal symptoms persisted, however, and there was concern he would further deteriorate, therefore he was admitted to the CCU instead of the [facility]. Patient is not speaking during my video interview in the CCU. He will open his eyes to voice but is not following commands. He has an active tremor in his upper extremities.     Review of Systems  Review of Systems   Unable to perform ROS: Mental status change       History  Past Medical History:   Diagnosis Date   • Alcohol abuse    • Alcoholism (HCC)      Past Surgical History:   Procedure Laterality Date   • WISDOM TOOTH EXTRACTION       History reviewed. No pertinent family history.  Social History     Tobacco Use   • Smoking status: Not on file   • Smokeless tobacco: Not on file   Substance Use Topics   • Alcohol use: Yes     Alcohol/week: 12.0 standard drinks     Types: 12 Shots of liquor per week   • Drug use: Yes     Types: Other     Comment: ETOH     No medications prior to admission.     Allergies:  Patient has no known allergies.    Objective     Vital Signs  Temp:  [98.4 °F (36.9 °C)-100.3 °F (37.9 °C)] 100.3 °F (37.9  °C)  Heart Rate:  [103-122] 115  Resp:  [18] 18  BP: (116-165)/() 145/83  Body mass index is 16.87 kg/m².    Physical Exam:  Physical Exam  This physical exam has been personally performed remotely in the unit aided by real-time audio/visual communication tools. CCU nursing staff were present at bedside during this exam and assisted during exam.     Physical Exam:  General: Patient is lethargic, opens eyes to voice but otherwise not following any commands. Has active tremor in upper extremities.   Head: Normocephalic, atraumatic  Eyes: EOMI. Conjunctivae and sclerae normal.  Ears: Ears appear intact with no abnormalities noted.   Neck: Trachea midline. No obvious JVD.  Heart: Tele reveals sinus tachycardia, HR in the 120s.   Lungs: Respirations appear to be regular, even and unlabored with no signs of respiratory distress. No audible wheezing.  Abdomen: No obvious abdominal distension.  MS: Muscle tone appears normal. No gross deformities.  Extremities: No clubbing, cyanosis or edema noted.  Skin: No visible bleeding, bruising, or rash.  Neurologic: Alert and oriented x3. No gross focal deficits.     Results Review:       Lab Results:  Results from last 7 days   Lab Units 01/04/22  1752   WBC 10*3/mm3 10.65   HEMOGLOBIN g/dL 12.7*   PLATELETS 10*3/mm3 91*     Results from last 7 days   Lab Units 01/04/22  1752   CRP mg/dL 55.78*     Results from last 7 days   Lab Units 01/04/22  1752   SODIUM mmol/L 136   POTASSIUM mmol/L 3.3*   CHLORIDE mmol/L 85*   CO2 mmol/L 18.0*   BUN mg/dL 27*   CREATININE mg/dL 1.14   CALCIUM mg/dL 9.8   GLUCOSE mg/dL 292*     Results from last 7 days   Lab Units 01/04/22  1752   MAGNESIUM mg/dL 1.9     No results found for: HGBA1C  Results from last 7 days   Lab Units 01/04/22  1752   BILIRUBIN mg/dL 0.8   ALK PHOS U/L 288*   AST (SGOT) U/L 147*   ALT (SGPT) U/L 102*     Results from last 7 days   Lab Units 01/05/22  0315   CK TOTAL U/L 1,269*   TROPONIN T ng/mL <0.010         Results  from last 7 days   Lab Units 01/04/22  1752   INR  0.96     Results from last 7 days   Lab Units 01/05/22  0315   PH, ARTERIAL pH units 7.427   PO2 ART mm Hg 77.9*   PCO2, ARTERIAL mm Hg 22.1*   HCO3 ART mmol/L 14.6*       I have reviewed the patient's laboratory results.    Imaging:  Imaging Results (Last 72 Hours)     Procedure Component Value Units Date/Time    CT Chest Without Contrast Diagnostic [[id number]] Collected: 01/05/22 0347     Updated: 01/05/22 0349    Narrative:      CT Chest WO    INDICATION:   Sepsis today    TECHNIQUE:   CT of the thorax without IV contrast. Coronal and sagittal reconstructions were obtained.  Radiation dose reduction techniques included automated exposure control or exposure modulation based on body size. Count of known CT and cardiac nuc med studies  performed in previous 12 months: 0.     COMPARISON:   None available.    FINDINGS:  Lungs are clear. Airways are patent. Aorta is normal in size. There is no adenopathy. Thyroid gland appears normal. Bones are unremarkable.      Impression:      Negative CT of the chest.    Signer Name: Jim Stanton MD   Signed: 1/5/2022 3:47 AM   Workstation Name: [email or web address]-    [facility]    CT Abdomen Pelvis Without Contrast [[id number]] Collected: 01/05/22 0346     Updated: 01/05/22 0348    Narrative:      CT Abdomen Pelvis WO    INDICATION:   Sepsis today    TECHNIQUE:   CT of the abdomen and pelvis without IV contrast. Coronal and sagittal reconstructions were obtained.  Radiation dose reduction techniques included automated exposure control or exposure modulation based on body size. Count of known CT and cardiac nuc  med studies performed in previous 12 months: 0.     COMPARISON:   None available.    FINDINGS:  Abdomen: There is diffuse fatty change throughout the liver. The gallbladder is normal. The spleen, pancreas, adrenal glands and kidneys are normal in appearance. Aorta is normal in size. The bowel including the  appendix appears normal.    Pelvis: The bladder, prostate gland are normal. Bones are unremarkable.      Impression:      Diffuse fatty change of liver. Otherwise negative CT abdomen pelvis          Signer Name: Jim Stanton MD   Signed: 1/5/2022 3:46 AM   Workstation Name: [email or web address]    [facility]    CT Head Without Contrast [[id number]] Collected: 01/04/22 1905     Updated: 01/04/22 1907    Narrative:      . PROCEDURE: CT Head WO, CT Max Facial Area WO    INDICATIONS: Closed head injury, fall and facial injury.    COMPARISON: No relevant comparison or correlation studies available at time of dictation.          Radiation dose reduction techniques included automated exposure control or exposure modulation based on body size.   Count of known CT and cardiac nuc med studies performed in previous 12 months: 1.     1.  CT HEAD:    FINDINGS:  CT examination of the brain is performed without IV contrast. Coronal and sagittal reconstructions were obtained.  There is no evidence of acute intracranial hemorrhage, mass effect or midline shift. No intra-axial or extra-axial fluid  collections. The ventricular system is normal.  The calvarium is intact.         Impression:      No acute intracranial trauma      2.  FACIAL CT:    TECHNIQUE:  Routine CT of the facial bones is performed without contrast with direct axial acquisition. Sagittal and coronal reconstructions are performed on separate workstation and reviewed.         FINDINGS:  No facial bone fractures are present. Orbital rims appear intact. Mandible is intact without evidence of temporomandibular dislocation. No definite nasal bone fracture and no soft tissue swelling.    Paranasal sinuses are well aerated and clear. Poor dentition noted with large left periapical lucency, correlate with visual inspection. Dental caries and multiple mild periapical lucencies seen in the maxilla. Large radiopaque density seen lateral to  the left  orbit.      IMPRESSION:  No evidence of facial bone fracture. However, significant periapical lucency seen on the lower left and the mandible, correlate with visual inspection of the mouth, this is not related to trauma.               Signer Name: oRsa Rapp MD   Signed: 1/4/2022 7:05 PM   Workstation Name: [email or web address]    [facility]    CT Facial Bones Without Contrast [[id number]] Collected: 01/04/22 1905     Updated: 01/04/22 1907    Narrative:      . PROCEDURE: CT Head WO, CT Max Facial Area WO    INDICATIONS: Closed head injury, fall and facial injury.    COMPARISON: No relevant comparison or correlation studies available at time of dictation.          Radiation dose reduction techniques included automated exposure control or exposure modulation based on body size.   Count of known CT and cardiac nuc med studies performed in previous 12 months: 1.     1.  CT HEAD:    FINDINGS:  CT examination of the brain is performed without IV contrast. Coronal and sagittal reconstructions were obtained.  There is no evidence of acute intracranial hemorrhage, mass effect or midline shift. No intra-axial or extra-axial fluid  collections. The ventricular system is normal.  The calvarium is intact.         Impression:      No acute intracranial trauma      2.  FACIAL CT:    TECHNIQUE:  Routine CT of the facial bones is performed without contrast with direct axial acquisition. Sagittal and coronal reconstructions are performed on separate workstation and reviewed.         FINDINGS:  No facial bone fractures are present. Orbital rims appear intact. Mandible is intact without evidence of temporomandibular dislocation. No definite nasal bone fracture and no soft tissue swelling.    Paranasal sinuses are well aerated and clear. Poor dentition noted with large left periapical lucency, correlate with visual inspection. Dental caries and multiple mild periapical lucencies seen in the maxilla. Large radiopaque  density seen lateral to  the left orbit.      IMPRESSION:  No evidence of facial bone fracture. However, significant periapical lucency seen on the lower left and the mandible, correlate with visual inspection of the mouth, this is not related to trauma.               Signer Name: Rosa Rapp MD   Signed: 1/4/2022 7:05 PM   Workstation Name: [facility]-    [facility]          I have personally reviewed the patient's radiologic imaging.        EKG:   Sinus tachycardia, , QTc prolonged at 517  Possible Left atrial enlargement  Possible Anterior infarct , age undetermined  Abnormal ECG  No previous ECGs available  Confirmed by Luis A Naik ([id number]) on 1/4/2022 6:46:43 PM    I have personally reviewed the patient's EKG.        Assessment/Plan     - Active alcohol withdrawal: admitted to CCU. CIWA protocol with PRN ativan ordered. If withdrawal symptoms worsen and patient becomes more agitated/combative, will have low threshold to begin precedex drip. Daily IV banana bag ordered. Replace electrolytes as noted below.   - SIRS, with tachycardia, CRP elevation, and lactic acidosis, but no clear source of infection at this time. Tachycardia can be explained by alcohol withdrawal, while lactic acidosis could be from excessive alcohol use. CRP meanwhile could be up in reaction to recent fall, though no bony fractures identified. CT chest/abdomen/pelvis showed no obvious infectious source. There is mention of significant pericapical lucency in the left maxilla and mandible which could be reflective of dental abscess. Recommend direct visualization by day-time hospitalist later this morning during rounds.   - QT prolongation: K+ is low and mag is low-normal; will replace both at this time. Repeat EKG tomorrow morning. Avoid QT prolonging meds in the mean time.   - Elevated anion gap metabolic acidosis with compensatory respiratory alkalosis resulting in balanced pH: suspect metabolic acidosis is  secondary to lactic acidosis and starvation ketosis. Continue to monitor.  - Hyperglycemia without formal diagnosis of diabetes: possibly reactive in nature, but hemoglobin A1c is pending.   - Mild transaminitis: AST>ALT, suggesting related to alcohol abuse. Continue to monitor.   - Mild thrombocytopenia: likely due to marrow suppression from longstanding heavy alcohol use. Continue to monitor for now.   - Mild CK elevation suggesting mild/early rhabdomyolysis: continue IV fluid hydration. Continue to trend CK daily.     DVT Prophylaxis: SCDs; avoid heparin products for now in light of mild thrombocytopenia above.    Estimated Length of Stay >2 midnights    I discussed the patient's findings, assessment and plan with nursing staff in the CCU.    * patient is high risk due to active alcohol withdrawal, SIRS    Alberto Angeles MD  01/05/22  06:01 EST      Electronically signed by Alberto Angeles MD at 01/05/22 0646          Emergency Department Notes      Hema Leavitt II, PA at 01/04/22 1739          Subjective     Fall  Mechanism of injury: fall    Injury location:  Face and head/neck  Head/neck injury location:  Head  Facial injury location:  Face  Incident location:  Home  Fall:     Fall occurred:  Tripped    Impact surface:  Hard floor    Point of impact:  Unable to specify  Suspicion of alcohol use: yes    Suspicion of drug use: no    Prior to arrival data:     Loss of consciousness: no    Associated symptoms: vomiting    Associated symptoms: no abdominal pain, no blindness, no difficulty breathing, no hearing loss and no nausea    Risk factors: no anticoagulation therapy, no beta blocker therapy, no CHF, no diabetes, no kidney disease, no past MI and not pregnant    Risk factors comment:  Alcoholism      Review of Systems   Constitutional: Negative.    HENT: Negative.  Negative for hearing loss.    Eyes: Negative.  Negative for blindness.   Respiratory: Negative.    Cardiovascular: Negative.     Gastrointestinal: Positive for vomiting. Negative for abdominal pain and nausea.   Endocrine: Negative.    Genitourinary: Negative.    Musculoskeletal: Negative.    Skin: Negative.    Allergic/Immunologic: Negative.    Neurological: Negative.    Hematological: Negative.    Psychiatric/Behavioral: Negative.        Past Medical History:   Diagnosis Date   • Alcohol abuse    • Alcoholism (HCC)        No Known Allergies    Past Surgical History:   Procedure Laterality Date   • WISDOM TOOTH EXTRACTION         History reviewed. No pertinent family history.    Social History     Socioeconomic History   • Marital status: Single   Substance and Sexual Activity   • Drug use: Yes     Types: Other     Comment: ETOH           Objective   Physical Exam  Vitals and nursing note reviewed.   Constitutional:       Appearance: He is well-developed.   HENT:      Head: Normocephalic.      Right Ear: External ear normal.      Left Ear: External ear normal.   Eyes:      Conjunctiva/sclera: Conjunctivae normal.      Pupils: Pupils are equal, round, and reactive to light.   Cardiovascular:      Rate and Rhythm: Normal rate and regular rhythm.      Heart sounds: Normal heart sounds.   Pulmonary:      Effort: Pulmonary effort is normal.      Breath sounds: Normal breath sounds.   Abdominal:      General: Bowel sounds are normal.      Palpations: Abdomen is soft.   Musculoskeletal:         General: Normal range of motion.      Cervical back: Normal range of motion and neck supple.   Skin:     General: Skin is warm and dry.      Capillary Refill: Capillary refill takes less than 2 seconds.   Neurological:      Mental Status: He is alert and oriented to person, place, and time.   Psychiatric:         Behavior: Behavior normal.         Thought Content: Thought content normal.         Procedures          ED Course  ED Course as of 01/05/22 0244   Tue Jan 04, 2022   1820 EKG noted sinus tachycardia.  190 bpm.  .  QRS 74.  QTc 517.  Possible  anterior infarct of indeterminate age.  No acute ST elevation. [SF]   2139 Intake has spoken with patient as he requests help with alcohol. Report they are awaiting 2nd lactic acid and when back they will speak with psychiatrist   [DELIO]   2202 Endorsed to Hema Leavitt PA-C [DELIO]   2227 Patient was evaluated by intake services. Patient appears to be going through alcohol withdrawal symptoms. Patient has not received any medication other than a banana bag. Patient was started on Librium and Ativan. Pending intake evaluation [RB]   Wed Jan 05, 2022   0133 CT facial bones rad interpreted:  IMPRESSION:  No evidence of facial bone fracture. However, significant periapical lucency seen on the lower left and the mandible, correlate with visual inspection of the mouth, this is not related to trauma. [RB]   0136 Patient appears to be in active withdrawal of alcohol.  Patient is confused disoriented moderate tremors.  Called intake nurse to discuss with Dr. Shirley treatment regiment to best facilitate recovery. [RB]   0153 Dr. Shirley suggested go ahead and give the patient another 50 of Librium and another 2 of Ativan.  Contacted Dr. Angeles for possible admission. [RB]   0243 Discussed with Dr. Angeles who is agreeable to place patient in the unit for treatment of alcohol withdrawal. [RB]      ED Course User Index  [DELIO] Filipe Leavitt APRN  [RB] Hema Leavitt II, PA  [SF] Priyank Rodriguez DO                Electronically signed by ANI Lackey, 01/04/22, 10:02 PM EST.                                   MDM  Number of Diagnoses or Management Options  Alcohol withdrawal delirium (HCC): new and requires workup  Contusion of face, initial encounter: new and requires workup     Amount and/or Complexity of Data Reviewed  Clinical lab tests: ordered and reviewed  Tests in the radiology section of CPT®: ordered and reviewed  Discuss the patient with other providers: yes    Risk of Complications, Morbidity, and/or  Mortality  Presenting problems: moderate  Diagnostic procedures: moderate  Management options: moderate    Patient Progress  Patient progress: stable      Final diagnoses:   Contusion of face, initial encounter   Alcohol withdrawal delirium (HCC)       ED Disposition  ED Disposition     ED Disposition Condition Comment    Decision to Admit            No follow-up provider specified.       Medication List      No changes were made to your prescriptions during this visit.          Hema Leavitt II, PA  01/05/22 0244      Electronically signed by Hema Leavitt II, PA at 01/05/22 0244     Josh Lincoln PCT at 01/04/22 1834        Pt taken off bedpan and cleaned by tech.     Josh Lincoln PCT  01/04/22 1835      Electronically signed by Josh Lincoln PCT at 01/04/22 1835     Candida Slaon, RN at 01/04/22 2005        Patient dad, Jose Armando Reich, called requesting update.   He left phone number of [phone]     Candida Sloan, RN  01/04/22 2006      Electronically signed by Candida Sloan, RN at 01/04/22 2006     Candida Sloan, RN at 01/04/22 2010        Called intake and talked with MICHAEL Sandoval. She states that they will be over to eval patient.      Candida Sloan, RN  01/04/22 2011      Electronically signed by Candida Sloan, RN at 01/04/22 2011     Scheduled Meds Sorted by Name  for Jose Armando Reich as of 1/3/22 through 1/5/22    1 Day 3 Days 7 Days 10 Days < Today >   Legend:                          Inactive     Active     Other Encounter     Linked                 Medications 01/03/22 01/04/22 01/05/22   bisacodyl (DULCOLAX) suppository 10 mg  Dose: 10 mg  Freq: Once Route: RE  Start: 01/05/22 1000           1000          chlordiazePOXIDE (LIBRIUM) capsule 50 mg  Dose: 50 mg  Freq: Once Route: PO  Start: 01/05/22 0143 End: 01/05/22 0217   Admin Instructions:    Caution: Look alike/sound alike drug alert.           0217          chlordiazePOXIDE (LIBRIUM) capsule 50 mg  Dose: 50  mg  Freq: Once Route: PO  Start: 01/04/22 2227 End: 01/04/22 2232   Admin Instructions:   Hold if Patient Sedated   Caution: Look alike/sound alike drug alert.          2232           clindamycin (CLEOCIN) 600 mg in dextrose 5% 50 mL IVPB (premix)  Dose: 600 mg  Freq: Once Route: IV  Indications Comment: possible dental abscess  Start: 01/05/22 0745   Admin Instructions:   Do Not refrigerate.           0745          folic acid 1 mg in sodium chloride 0.9 % 50 mL IVPB  Dose: 1 mg  Freq: Daily Route: IV  Start: 01/05/22 1000   Admin Instructions:   Protect from light.           1000          insulin aspart (novoLOG) injection 0-7 Units  Dose: 0-7 Units  Freq: 3 Times Daily Before Meals Route: SC  Start: 01/05/22 0845   Admin Instructions:   Correction - Low Dose.  Less than 40 units/day total insulin dose or lean, elderly, renal patients    Blood glucose 150-199 mg/dL - 2 units  Blood glucose 200-249 mg/dL - 3 units  Blood glucose 250-299 mg/dL - 4 units  Blood glucose 300-349 mg/dL - 5 units  Blood glucose 350-400 mg/dL - 6 units  Blood glucose greater than 400 mg/dL - 7 units and call provider             [id number]        lactulose (CHRONULAC) 10 GM/15ML solution 30 g  Dose: 30 g  Freq: Daily Route: PO  Start: 01/05/22 1000 End: 01/06/22 0859   Admin Instructions:   May be mixed with fruit juice, water, or milk.           1000          LORazepam (ATIVAN) injection 1 mg  Dose: 1 mg  Freq: Once Route: IV  Start: 01/05/22 0013 End: 01/05/22 0025   Admin Instructions:    Caution: Look alike/sound alike drug alert. Dilute 1:1 with normal saline.           0025          LORazepam (ATIVAN) injection 2 mg  Dose: 2 mg  Freq: Once Route: IV  Start: 01/05/22 0143 End: 01/05/22 0157   Admin Instructions:    Caution: Look alike/sound alike drug alert. Dilute 1:1 with normal saline.           0157          LORazepam (ATIVAN) injection 2 mg  Dose: 2 mg  Freq: Once Route: IV  Start: 01/04/22 2227 End: 01/04/22 2231   Admin  "Instructions:    Caution: Look alike/sound alike drug alert. Dilute 1:1 with normal saline.          2231           multiple vitamin 10 mL in sodium chloride 0.9 % 1,000 mL IVPB  Freq: Daily Route: IV  Start: 01/05/22 1000   Admin Instructions:   Hold 0.9% sodium chloride infusion while MVI is running.           1000          potassium chloride (K-DUR,KLOR-CON) CR tablet 40 mEq  Dose: 40 mEq  Freq: Once Route: PO  Start: 01/04/22 2014 End: 01/04/22 2016   Admin Instructions:   Do not crush. Take with food.          (2016)   2016-D/C'd        potassium chloride (KLOR-CON) packet 40 mEq  Dose: 40 mEq  Freq: Once Route: PO  Start: 01/04/22 2100   Admin Instructions:   For use with a feeding tube. Mix in at least 4 oz. of liquid.          (2042) [C]           potassium chloride (KLOR-CON) packet 40 mEq  Dose: 40 mEq  Freq: Once Route: PO  Start: 01/04/22 2030 End: 01/04/22 2025   Admin Instructions:   For use with a feeding tube. Mix in at least 4 oz. of liquid.          2025           sodium chloride 0.9 % bolus 1,000 mL  Dose: 1,000 mL  Freq: Once Route: IV  Last Dose: Stopped (01/05/22 0203)  Start: 01/05/22 0013 End: 01/05/22 0203 0025 0203         sodium chloride 0.9 % bolus 1,000 mL  Dose: 1,000 mL  Freq: Once Route: IV  Last Dose: Stopped (01/05/22 0024)  Start: 01/04/22 2154 End: 01/05/22 0024 2231             0024          sodium chloride 0.9 % flush 10 mL  Dose: 10 mL  Freq: Every 12 Hours Scheduled Route: IV  Start: 01/05/22 0900           0900   2100         sodium chloride 0.9 % flush 10 mL  Dose: 10 mL  Freq: Every 12 Hours Scheduled Route: IV  Start: 01/05/22 0900           0900   2100         thiamine (B-1) 100 mg, folic acid 1 mg in sodium chloride 0.9 % 1,000 mL infusion  Dose: 100 mL/hr  Freq: Daily Route: IV  Start: 01/05/22 0900 End: 01/05/22 0755   Admin Instructions:   \"Banana bag\" or \"Rally Pack\".  Use if patient cannot take oral medications.           0755-D/C'd      "   thiamine (B-1) 100 mg, folic acid 1 mg in sodium chloride 0.9 % 1,000 mL infusion  Dose: 1,000 mL/hr  Freq: Once Route: IV  Last Dose: Stopped (01/04/22 2135)  Start: 01/04/22 1830 End: 01/04/22 2135 1910 2135          thiamine (B-1) 200 mg in sodium chloride 0.9 % 100 mL IVPB  Dose: 200 mg  Freq: Daily Route: IV  Start: 01/05/22 1000   Admin Instructions:   Protect from light.           1000          thiamine (B-1) injection 200 mg  Dose: 200 mg  Freq: Daily Route: IM  Start: 01/05/22 0900 End: 01/05/22 0801           0801-D/C'd        Medications 01/03/22 01/04/22 01/05/22           Continuous Meds Sorted by Name  for Jose Armando Reich as of 1/3/22 through 1/5/22  Legend:         Inactive     Active     Other Encounter     Linked                 Medications 01/03/22 01/04/22 01/05/22   sodium chloride 0.9 % infusion  Rate: 125 mL/hr Dose: 125 mL/hr  Freq: Continuous Route: IV  Start: 01/05/22 0845           0845                PRN Meds Sorted by Name  for Jose Armando Reich as of 1/3/22 through 1/5/22  Legend:                          Inactive     Active     Other Encounter     Linked                 Medications 01/03/22 01/04/22 01/05/22   dextrose (D50W) (25 g/50 mL) IV injection 25 g  Dose: 25 g  Freq: Every 15 Minutes PRN Route: IV  PRN Reason: Low Blood Sugar  PRN Comment: Blood Sugar Less Than 70  Start: 01/05/22 0753   Admin Instructions:   Blood sugar less than 70; patient has IV access - Unresponsive, NPO or Unable To Safely Swallow         dextrose (GLUTOSE) oral gel 15 g  Dose: 15 g  Freq: Every 15 Minutes PRN Route: PO  PRN Reason: Low Blood Sugar  PRN Comment: Blood sugar less than 70  Start: 01/05/22 0753   Admin Instructions:   BS<70, Patient Alert, Is not NPO, Can safely swallow.         glucagon (human recombinant) (GLUCAGEN DIAGNOSTIC) injection 1 mg  Dose: 1 mg  Freq: Every 15 Minutes PRN Route: SC  PRN Reason: Low Blood Sugar  PRN Comment: Blood Glucose Less Than 70  Start: 01/05/22  0753   Admin Instructions:   Blood Glucose Less Than 70 - Patient Without IV Access - Unresponsive, NPO or Unable To Safely Swallow         LORazepam (ATIVAN) tablet 1 mg  Dose: 1 mg  Freq: Every 2 Hours PRN Route: PO  PRN Reason: Withdrawal  PRN Comment: For CIWA-Ar 8-10  Start: 01/05/22 0243 End: 01/12/22 0242   Admin Instructions:   Reassess 2 Hours After Administration   Caution: Look alike/sound alike drug alert                     Or  LORazepam (ATIVAN) injection 1 mg  Dose: 1 mg  Freq: Every 2 Hours PRN Route: IV  PRN Reason: Withdrawal  PRN Comment: For CIWA-Ar 8-10  Start: 01/05/22 0243 End: 01/12/22 0242   Admin Instructions:   Reassess 2 Hours After Administration   Caution: Look alike/sound alike drug alert. Dilute 1:1 with normal saline.                     Or  LORazepam (ATIVAN) tablet 2 mg  Dose: 2 mg  Freq: Every 1 Hour PRN Route: PO  PRN Reason: Withdrawal  PRN Comment: For CIWA-Ar 11-15  Start: 01/05/22 0243 End: 01/12/22 0242   Admin Instructions:   Reassess 1 Hour After Administration   Caution: Look alike/sound alike drug alert                     Or  LORazepam (ATIVAN) injection 2 mg  Dose: 2 mg  Freq: Every 1 Hour PRN Route: IV  PRN Reason: Withdrawal  PRN Comment: For CIWA-Ar 11-15  Start: 01/05/22 0243 End: 01/12/22 0242   Admin Instructions:   Reassess 1 Hour After Administration   Caution: Look alike/sound alike drug alert. Dilute 1:1 with normal saline.           0640 [C]          Or  LORazepam (ATIVAN) injection 2 mg  Dose: 2 mg  Freq: Every 15 Minutes PRN Route: IV  PRN Reason: Withdrawal  PRN Comment: For CIWA-Ar Greater Than 15.  Repeat Dose in 15 Minutes if CIWA-Ar Does Not Decrease  Start: 01/05/22 0243 End: 01/12/22 0242   Admin Instructions:   Reassess 15 Minutes After Each Administration.  If CIWA-Ar Remains Greater Than 15 1 Hour After Administration of 2 IV Doses, Administer LORazepam (ATIVAN) 4 mg oral or IV & Reassess Every Hour   Caution: Look alike/sound alike drug alert.  Dilute 1:1 with normal saline.                     Or  LORazepam (ATIVAN) injection 2 mg  Dose: 2 mg  Freq: Every 15 Minutes PRN Route: IM  PRN Reason: Withdrawal  PRN Comment: If Unable to Administer IV - For CIWA-Ar Greater Than 15.  Repeat Dose in 15 Minutes if CIWA-Ar Does Not Decrease  Start: 01/05/22 0243 End: 01/12/22 0242   Admin Instructions:   Reassess 15 Minutes After Each Administration.  If CIWA-Ar Remains Greater Than 15 1 Hour After Administration of 2 IV Doses, Administer LORazepam (ATIVAN) 4 mg oral or IV & Reassess Every Hour Caution: Look alike/sound alike drug alert. Dilute 1:1 with normal saline.                     Or  LORazepam (ATIVAN) tablet 4 mg  Dose: 4 mg  Freq: Every 1 Hour PRN Route: PO  PRN Reason: Withdrawal  PRN Comment: For CIWA-Ar Greater Than 15 After 2 Doses of 2 mg IV/IM.  Repeat Dose in 1 Hour if CIWA-Ar Does Not Decrease  Start: 01/05/22 0243 End: 01/12/22 0242   Admin Instructions:   Reassess 1 Hour After Administratio Caution: Look alike/sound alike drug alert                     Or  LORazepam (ATIVAN) injection 4 mg  Dose: 4 mg  Freq: Every 1 Hour PRN Route: IV  PRN Reason: Withdrawal  PRN Comment: If Unable to Administer IV - For CIWA-Ar Greater Than 15 After 2 Doses of 2 mg IV/IM.  Repeat Dose in 1 Hour if CIWA-Ar Does Not Decrease  Start: 01/05/22 0243 End: 01/12/22 0242   Admin Instructions:   Reassess 1 Hour After Administration   Caution: Look alike/sound alike drug alert. Dilute 1:1 with normal saline.                     Magnesium Sulfate 2 gram Bolus, followed by 8 gram infusion (total Mg dose 10 grams)- Mg less than or equal to 1mg/dL  Dose: 2 g  Freq: As Needed Route: IV  PRN Comment: See Administration Instructions  Start: 01/05/22 0242   Admin Instructions:   Mg less than or equal to 1mg/dL. Give 2 gm over 30 minutes as bolus, then infuse 2 gm over 2 hours for 4 doses (8 grams) for total dose of 10 grams.  Recheck Mg levels in the AM.         Or  Magnesium  Sulfate 2 gram / 50mL Infusion (GIVE X 3 BAGS TO EQUAL 6GM TOTAL DOSE) - Mg 1.1 - 1.5 mg/dl  Dose: 2 g  Freq: As Needed Route: IV  PRN Comment: See Administration Instructions  Start: 01/05/22 0242   Admin Instructions:   Mg 1.1 -1.5 mg/dL. Infuse 2 grams over 2 hours for 3 doses (for a total Mg dose of 6 grams).  Recheck Mg level in the AM.         Or  Magnesium Sulfate 4 gram infusion- Mg 1.6-1.9 mg/dL  Dose: 4 g  Freq: As Needed Route: IV  PRN Comment: See Administration Instructions  Start: 01/05/22 0242   Admin Instructions:   Mg 1.6-1.9 mg/dL. Recheck Mg level in the AM.         potassium chloride (K-DUR,KLOR-CON) CR tablet 40 mEq  Dose: 40 mEq  Freq: As Needed Route: PO  PRN Comment: Potassium Replacement.  See Admin Instructions  Start: 01/05/22 0614   Admin Instructions:   Potassium 3.1 or Less Give KCl 40 mEq q4h x3 Doses   Potassium 3.2 - 3.6 Give KCl 40 mEq q4h x2 Doses     Check Potassium 4 Hours After Last Dose Given   Check Magnesium if Potassium Level Remains Low After Replacement   DO NOT GIVE if CrCl is Less Than 30 mL/minute or Urine Output Less Than 30 mL/hr         Or  potassium chloride (KLOR-CON) packet 40 mEq  Dose: 40 mEq  Freq: As Needed Route: PO  PRN Comment: potassium replacement, see admin instructions  Start: 01/05/22 0614   Admin Instructions:   Potassium 3.1 or Less Give KCl 40 mEq q4h x3 Doses   Potassium 3.2 - 3.6 Give KCl 40 mEq q4h x2 Doses     Check Potassium 4 Hours After Last Dose Given   Check Magnesium if Potassium Level Remains Low After Replacement   DO NOT GIVE if CrCl is Less Than 30 mL/minute or Urine Output Less Than 30 mL/hr         Or  potassium chloride 10 mEq in 100 mL IVPB  Dose: 10 mEq  Freq: Every 1 Hour PRN Route: IV  PRN Comment: Potassium Replacement - See Admin Instructions  Start: 01/05/22 0614   Admin Instructions:   Peripheral or Central IV  Potassium 3.1 or Less Give KCl 10 mEq/100 mL NS IV q1h x6 Doses  Potassium 3.2 - 3.6 Give KCl 10 mEq/100 mL NS q1h  x4 Doses    Check Potassium 4 Hours After Last Dose Given  Check Magnesium if Potassium Remains Low After Replacement  DO NOT GIVE if CrCl is Less Than 30 mL/minute or Urine Output Less Than 30 mL/hr.     Rates Greater Than 10 mEq/hr Require ECG Monitoring.  OUTPATIENT/NON-MONITORED UNITS: Potassium Chloride standard bolus infusion rate is a maximum of 10 mEq/hr on unmonitored patients    MONITORED UNITS: Potassium Chloride standard bolus infusion rate is a maximum of 20 mEq/hr on ECG monitored patients ONLY         sodium chloride 0.9 % flush 10 mL  Dose: 10 mL  Freq: As Needed Route: IV  PRN Reason: Line Care  PRN Comment: After Medication Administration or Blood Draw  Start: 01/05/22 0634         sodium chloride 0.9 % flush 10 mL  Dose: 10 mL  Freq: As Needed Route: IV  PRN Reason: Line Care  Start: 01/05/22 0553         sodium chloride 0.9 % flush 10 mL  Dose: 10 mL  Freq: As Needed Route: IV  PRN Reason: Line Care  Start: 01/05/22 0007 End: 01/05/22 0007           0007-D/C'd        sodium chloride 0.9 % flush 10 mL  Dose: 10 mL  Freq: As Needed Route: IV  PRN Reason: Line Care  Start: 01/04/22 1740         Medications 01/03/22 01/04/22 01/05/22

## 2022-01-05 NOTE — NURSING NOTE
Presented pt to Dr. Shirley. He feels patient is not medically stable enough to be admitted to our detox unit. He recommends patient be admitted to a medical unit. ED provider aware.

## 2022-01-05 NOTE — H&P
Hospitalist History and Physical        Patient Identification  Name: Jose Armando Reich  Age/Sex: 56 y.o. male  :  1965        MRN: [id number]  Visit Number: [id number]  Admit Date: 2022   PCP: Provider, No Known        Chief complaint found in the floor after fall, trying to detox from alcohol    History of Present Illness:  Patient is a 56 y.o. male with reported history of alcoholism who was found in the floor by his father on 2022. He drinks at least 12 shots of vodka daily and has done so for the last 30 years. He reportedly fell around 6 am on 1/3/21 while intoxicated and could not get up on his own. This was the last time he had any alcohol. His father found him covered in urine and called EMS. Patient requested detox from alcohol upon arrival to the ED around 5pm on 21. Over the course of his ED stay, it became more apparent that he was starting to go through active withdrawal, with tremors, tachycardia and confusion. He was administered both valium and ativan per psychiatry recommendations. His withdrawal symptoms persisted, however, and there was concern he would further deteriorate, therefore he was admitted to the CCU instead of the [facility]. Patient is not speaking during my video interview in the CCU. He will open his eyes to voice but is not following commands. He has an active tremor in his upper extremities.     Review of Systems  Review of Systems   Unable to perform ROS: Mental status change       History  Past Medical History:   Diagnosis Date   • Alcohol abuse    • Alcoholism (HCC)      Past Surgical History:   Procedure Laterality Date   • WISDOM TOOTH EXTRACTION       History reviewed. No pertinent family history.  Social History     Tobacco Use   • Smoking status: Not on file   • Smokeless tobacco: Not on file   Substance Use Topics   • Alcohol use: Yes     Alcohol/week: 12.0 standard drinks     Types: 12 Shots of liquor per week   • Drug use: Yes     Types: Other      Comment: ETOH     No medications prior to admission.     Allergies:  Patient has no known allergies.    Objective     Vital Signs  Temp:  [98.4 °F (36.9 °C)-100.3 °F (37.9 °C)] 100.3 °F (37.9 °C)  Heart Rate:  [103-122] 115  Resp:  [18] 18  BP: (116-165)/() 145/83  Body mass index is 16.87 kg/m².    Physical Exam:  Physical Exam  This physical exam has been personally performed remotely in the unit aided by real-time audio/visual communication tools. CCU nursing staff were present at bedside during this exam and assisted during exam.     Physical Exam:  General: Patient is lethargic, opens eyes to voice but otherwise not following any commands. Has active tremor in upper extremities.   Head: Normocephalic, atraumatic  Eyes: EOMI. Conjunctivae and sclerae normal.  Ears: Ears appear intact with no abnormalities noted.   Neck: Trachea midline. No obvious JVD.  Heart: Tele reveals sinus tachycardia, HR in the 120s.   Lungs: Respirations appear to be regular, even and unlabored with no signs of respiratory distress. No audible wheezing.  Abdomen: No obvious abdominal distension.  MS: Muscle tone appears normal. No gross deformities.  Extremities: No clubbing, cyanosis or edema noted.  Skin: No visible bleeding, bruising, or rash.  Neurologic: Alert and oriented x3. No gross focal deficits.     Results Review:       Lab Results:  Results from last 7 days   Lab Units 01/04/22  1752   WBC 10*3/mm3 10.65   HEMOGLOBIN g/dL 12.7*   PLATELETS 10*3/mm3 91*     Results from last 7 days   Lab Units 01/04/22  1752   CRP mg/dL 55.78*     Results from last 7 days   Lab Units 01/04/22  1752   SODIUM mmol/L 136   POTASSIUM mmol/L 3.3*   CHLORIDE mmol/L 85*   CO2 mmol/L 18.0*   BUN mg/dL 27*   CREATININE mg/dL 1.14   CALCIUM mg/dL 9.8   GLUCOSE mg/dL 292*     Results from last 7 days   Lab Units 01/04/22  1752   MAGNESIUM mg/dL 1.9     No results found for: HGBA1C  Results from last 7 days   Lab Units 01/04/22  1752    BILIRUBIN mg/dL 0.8   ALK PHOS U/L 288*   AST (SGOT) U/L 147*   ALT (SGPT) U/L 102*     Results from last 7 days   Lab Units 01/05/22  0315   CK TOTAL U/L 1,269*   TROPONIN T ng/mL <0.010         Results from last 7 days   Lab Units 01/04/22  1752   INR  0.96     Results from last 7 days   Lab Units 01/05/22  0315   PH, ARTERIAL pH units 7.427   PO2 ART mm Hg 77.9*   PCO2, ARTERIAL mm Hg 22.1*   HCO3 ART mmol/L 14.6*       I have reviewed the patient's laboratory results.    Imaging:  Imaging Results (Last 72 Hours)     Procedure Component Value Units Date/Time    CT Chest Without Contrast Diagnostic [[id number]] Collected: 01/05/22 0347     Updated: 01/05/22 0349    Narrative:      CT Chest WO    INDICATION:   Sepsis today    TECHNIQUE:   CT of the thorax without IV contrast. Coronal and sagittal reconstructions were obtained.  Radiation dose reduction techniques included automated exposure control or exposure modulation based on body size. Count of known CT and cardiac nuc med studies  performed in previous 12 months: 0.     COMPARISON:   None available.    FINDINGS:  Lungs are clear. Airways are patent. Aorta is normal in size. There is no adenopathy. Thyroid gland appears normal. Bones are unremarkable.      Impression:      Negative CT of the chest.    Signer Name: Jim Stanton MD   Signed: 1/5/2022 3:47 AM   Workstation [facility]: [facility]    [facility]    CT Abdomen Pelvis Without Contrast [[id number]] Collected: 01/05/22 0346     Updated: 01/05/22 0348    Narrative:      CT Abdomen Pelvis WO    INDICATION:   Sepsis today    TECHNIQUE:   CT of the abdomen and pelvis without IV contrast. Coronal and sagittal reconstructions were obtained.  Radiation dose reduction techniques included automated exposure control or exposure modulation based on body size. Count of known CT and cardiac nuc  med studies performed in previous 12 months: 0.     COMPARISON:   None available.    FINDINGS:  Abdomen:  There is diffuse fatty change throughout the liver. The gallbladder is normal. The spleen, pancreas, adrenal glands and kidneys are normal in appearance. Aorta is normal in size. The bowel including the appendix appears normal.    Pelvis: The bladder, prostate gland are normal. Bones are unremarkable.      Impression:      Diffuse fatty change of liver. Otherwise negative CT abdomen pelvis          Signer Name: Jim Stanton MD   Signed: 1/5/2022 3:46 AM   Workstation [facility]: [facility]    [facility]    CT Head Without Contrast [[id number]] Collected: 01/04/22 1905     Updated: 01/04/22 1907    Narrative:      . PROCEDURE: CT Head WO, CT Max Facial Area WO    INDICATIONS: Closed head injury, fall and facial injury.    COMPARISON: No relevant comparison or correlation studies available at time of dictation.          Radiation dose reduction techniques included automated exposure control or exposure modulation based on body size.   Count of known CT and cardiac nuc med studies performed in previous 12 months: 1.     1.  CT HEAD:    FINDINGS:  CT examination of the brain is performed without IV contrast. Coronal and sagittal reconstructions were obtained.  There is no evidence of acute intracranial hemorrhage, mass effect or midline shift. No intra-axial or extra-axial fluid  collections. The ventricular system is normal.  The calvarium is intact.         Impression:      No acute intracranial trauma      2.  FACIAL CT:    TECHNIQUE:  Routine CT of the facial bones is performed without contrast with direct axial acquisition. Sagittal and coronal reconstructions are performed on separate workstation and reviewed.         FINDINGS:  No facial bone fractures are present. Orbital rims appear intact. Mandible is intact without evidence of temporomandibular dislocation. No definite nasal bone fracture and no soft tissue swelling.    Paranasal sinuses are well aerated and clear. Poor dentition noted with  large left periapical lucency, correlate with visual inspection. Dental caries and multiple mild periapical lucencies seen in the maxilla. Large radiopaque density seen lateral to  the left orbit.      IMPRESSION:  No evidence of facial bone fracture. However, significant periapical lucency seen on the lower left and the mandible, correlate with visual inspection of the mouth, this is not related to trauma.               Signer Name: Rosa Rapp MD   Signed: 1/4/2022 7:05 PM   Workstation Name: [employer]    [facility] [place]    CT Facial Bones Without Contrast [[id number]] Collected: 01/04/22 1905     Updated: 01/04/22 1907    Narrative:      . PROCEDURE: CT Head WO, CT Max Facial Area WO    INDICATIONS: Closed head injury, fall and facial injury.    COMPARISON: No relevant comparison or correlation studies available at time of dictation.          Radiation dose reduction techniques included automated exposure control or exposure modulation based on body size.   Count of known CT and cardiac nuc med studies performed in previous 12 months: 1.     1.  CT HEAD:    FINDINGS:  CT examination of the brain is performed without IV contrast. Coronal and sagittal reconstructions were obtained.  There is no evidence of acute intracranial hemorrhage, mass effect or midline shift. No intra-axial or extra-axial fluid  collections. The ventricular system is normal.  The calvarium is intact.         Impression:      No acute intracranial trauma      2.  FACIAL CT:    TECHNIQUE:  Routine CT of the facial bones is performed without contrast with direct axial acquisition. Sagittal and coronal reconstructions are performed on separate workstation and reviewed.         FINDINGS:  No facial bone fractures are present. Orbital rims appear intact. Mandible is intact without evidence of temporomandibular dislocation. No definite nasal bone fracture and no soft tissue swelling.    Paranasal sinuses are well aerated  and clear. Poor dentition noted with large left periapical lucency, correlate with visual inspection. Dental caries and multiple mild periapical lucencies seen in the maxilla. Large radiopaque density seen lateral to  the left orbit.      IMPRESSION:  No evidence of facial bone fracture. However, significant periapical lucency seen on the lower left and the mandible, correlate with visual inspection of the mouth, this is not related to trauma.               Signer Name: Rosa Rapp MD   Signed: 1/4/2022 7:05 PM   Workstation Name: [facility]    [facility] [place]          I have personally reviewed the patient's radiologic imaging.        EKG:   Sinus tachycardia, , QTc prolonged at 517  Possible Left atrial enlargement  Possible Anterior infarct , age undetermined  Abnormal ECG  No previous ECGs available  Confirmed by Luis A Naik ([id number]) on 1/4/2022 6:46:43 PM    I have personally reviewed the patient's EKG.        Assessment/Plan     - Active alcohol withdrawal: admitted to CCU. CIWA protocol with PRN ativan ordered. If withdrawal symptoms worsen and patient becomes more agitated/combative, will have low threshold to begin precedex drip. Daily IV banana bag ordered. Replace electrolytes as noted below.   - SIRS, with tachycardia, CRP elevation, and lactic acidosis, but no clear source of infection at this time. Tachycardia can be explained by alcohol withdrawal, while lactic acidosis could be from excessive alcohol use. CRP meanwhile could be up in reaction to recent fall, though no bony fractures identified. CT chest/abdomen/pelvis showed no obvious infectious source. There is mention of significant pericapical lucency in the left maxilla and mandible which could be reflective of dental abscess. Recommend direct visualization by day-time hospitalist later this morning during rounds.   - QT prolongation: K+ is low and mag is low-normal; will replace both at this time. Repeat EKG  tomorrow morning. Avoid QT prolonging meds in the mean time.   - Elevated anion gap metabolic acidosis with compensatory respiratory alkalosis resulting in balanced pH: suspect metabolic acidosis is secondary to lactic acidosis and starvation ketosis. Continue to monitor.  - Hyperglycemia without formal diagnosis of diabetes: possibly reactive in nature, but hemoglobin A1c is pending.   - Mild transaminitis: AST>ALT, suggesting related to alcohol abuse. Continue to monitor.   - Mild thrombocytopenia: likely due to marrow suppression from longstanding heavy alcohol use. Continue to monitor for now.   - Mild CK elevation suggesting mild/early rhabdomyolysis: continue IV fluid hydration. Continue to trend CK daily.     DVT Prophylaxis: SCDs; avoid heparin products for now in light of mild thrombocytopenia above.    Estimated Length of Stay >2 midnights    I discussed the patient's findings, assessment and plan with nursing staff in the CCU.    * patient is high risk due to active alcohol withdrawal, SIRS    Alberto Angeles MD  01/05/22  06:01 EST

## 2022-01-05 NOTE — NURSING NOTE
CLEO ARCHER Father [phone]     Patient is unable to answer admitting questions at this time. Called Emergency contact number and someone picked up and hung the phone up twice. Will try again later.

## 2022-01-06 NOTE — PLAN OF CARE
Problem: Adult Inpatient Plan of Care  Goal: Plan of Care Review  Outcome: Ongoing, Progressing  Flowsheets  Taken 1/6/2022 1544 by Claribel Chinchilla, RN  Progress: no change  Taken 1/6/2022 1888 by Suzan Byers RN  Plan of Care Reviewed With: patient  Outcome Summary: [place]. CIWA protocol maintained, score 11-12 each time, treated with ativan. Electrolyte replacement in place. No signs of distress at this time. Bed alarm set, call light within reach.   Goal Outcome Evaluation:           Progress: no change

## 2022-01-06 NOTE — PROGRESS NOTES
[facility] HOSPITALIST PROGRESS NOTE     Patient Identification:  Name:  Jose Armando Reich  Age:  56 y.o.  Sex:  male  :  1965  MRN:  [id number]  Visit Number:  [id number]  Primary Care Provider:  Provider, No Known    Length of stay:  1    Subjective: Patient seen and examined, patient is now awake, he answers questions but very weak in whispers, denies any pain.  He follows commands, history of significant tremors renal function slightly worsened, blood culture noted, source likely from the ulcers of his legs from trauma, he has significant cellulitis of right leg.  CPKs down.    Chief Complaint: Alcohol withdrawal  ----------------------------------------------------------------------------------------------------------------------  Current Hospital Meds:  folic acid (FOLVITE) IVPB, 1 mg, Intravenous, Daily  insulin aspart, 0-7 Units, Subcutaneous, TID AC  multivitamin IVPB, , Intravenous, Daily  piperacillin-tazobactam, 3.375 g, Intravenous, Q8H  potassium chloride, 40 mEq, Oral, Once  sodium chloride, 10 mL, Intravenous, Q12H  sodium chloride, 10 mL, Intravenous, Q12H  thiamine (VITAMIN B1) IVPB, 200 mg, Intravenous, Daily  vancomycin, 1,500 mg, Intravenous, Q24H      Pharmacy Consult - Pharmacy to dose,   Pharmacy Consult - Pharmacy to dose,   sodium chloride, 125 mL/hr, Last Rate: 125 mL/hr (22 0326)      ----------------------------------------------------------------------------------------------------------------------  Vital Signs:  Temp:  [97.7 °F (36.5 °C)-100.2 °F (37.9 °C)] 97.7 °F (36.5 °C)  Heart Rate:  [101-113] 102  Resp:  [16-18] 16  BP: (113-148)/(67-91) 128/80       Tele: Sinus rhythm 97 bpm      22  1713 22  0637 22  0521   Weight: 61.2 kg (135 lb) 73.9 kg (162 lb 14.4 oz) 74.3 kg (163 lb 12.8 oz)     Body mass index is 20.47 kg/m².    Intake/Output Summary (Last 24 hours) at 2022 1503  Last data filed at 2022 1139  Gross per 24 hour    Intake 1317.99 ml   Output 1000 ml   Net 317.99 ml     NPO Diet  ----------------------------------------------------------------------------------------------------------------------  Physical exam:  General: Chronically ill-appearing, awake and alert, very weak, he whispers he follows commands, is not in respiratory distress   Skin:  Skin is warm and dry. No rash noted. No pallor.    HENT:  Head:  Normocephalic and atraumatic.    Dry mucous membranes.  No tongue fasciculation  eyes: No nystagmus conjunctivae and EOM are normal.  Pupils are equal, round, and reactive to light.  No scleral icterus.    Neck:  Neck supple.  No JVD present.    Pulmonary/Chest:  No respiratory distress, no wheezes, no crackles, with normal breath sounds and good air movement.  Cardiovascular:  Normal rate, regular rhythm and normal heart sounds with no murmur.  Abdominal: No palpable mass soft.  Bowel sounds are normal.  No distension and no tenderness.   Extremities: Multiple different stages of abrasion, contusion, hematoma, right lower extremity cellulitis noted.    Strong pulses in all 4 extremities with no clubbing, no cyanosis, no edema.  Neurological:  Motor strength equal no obvious deficit, sensory grossly intact.   No cranial nerve deficit.  No tongue deviation.  No facial droop.  No slurred speech.   Tremors noted both extremities  Genitourinary: External catheter noted  Back:  ----------------------------------------------------------------------------------------------------------------------  ----------------------------------------------------------------------------------------------------------------------  Results from last 7 days   Lab Units 01/06/22  0124 01/05/22  0850 01/05/22  0315   CK TOTAL U/L 548* 994* 1,269*   TROPONIN T ng/mL  --   --  <0.010     Results from last 7 days   Lab Units 01/06/22  0124 01/05/22  0850 01/05/22  0603 01/05/22  0231 01/04/22  2112 01/04/22  1752 01/04/22 1752   CRP mg/dL  --   --   54.03*  --   --   --  55.78*   LACTATE mmol/L  --   --   --  1.7 2.2*  --  2.8*   WBC 10*3/mm3 6.28 6.58 9.92  --   --    < > 10.65   HEMOGLOBIN g/dL 10.1* 10.5* 10.7*  --   --    < > 12.7*   HEMATOCRIT % 31.5* 32.8* 33.4*  --   --    < > 38.3   MCV fL 98.7* 97.3* 96.8  --   --    < > 96.0   MCHC g/dL 32.1 32.0 32.0  --   --    < > 33.2   PLATELETS 10*3/mm3 86* 82* 86*  --   --    < > 91*   INR   --   --   --   --   --   --  0.96    < > = values in this interval not displayed.     Results from last 7 days   Lab Units 01/05/22  0315   PH, ARTERIAL pH units 7.427   PO2 ART mm Hg 77.9*   PCO2, ARTERIAL mm Hg 22.1*   HCO3 ART mmol/L 14.6*     Results from last 7 days   Lab Units 01/06/22  0124 01/05/22  0850 01/05/22  0603 01/04/22  1752   SODIUM mmol/L 146*  --  137 136   POTASSIUM mmol/L 2.9*  --  3.7 3.3*   MAGNESIUM mg/dL 1.9 1.7  --  1.9   CHLORIDE mmol/L 107  --  96* 85*   CO2 mmol/L 12.9*  --  12.3* 18.0*   BUN mg/dL 33*  --  27* 27*   CREATININE mg/dL 1.37*  --  1.35* 1.14   EGFR IF NONAFRICN AM mL/min/1.73 53*  --  54* 66   CALCIUM mg/dL 7.9*  --  8.6 9.8   GLUCOSE mg/dL 202*  --  262* 292*   ALBUMIN g/dL  --   --  2.40* 3.52   BILIRUBIN mg/dL  --   --  0.5 0.8   ALK PHOS U/L  --   --  198* 288*   AST (SGOT) U/L  --   --  85* 147*   ALT (SGPT) U/L  --   --  66* 102*   Estimated Creatinine Clearance: 61.8 mL/min (A) (by C-G formula based on SCr of 1.37 mg/dL (H)).    No results found for: AMMONIA      Blood Culture   Date Value Ref Range Status   01/04/2022 Gram Negative Bacilli (C)  Preliminary   01/04/2022 Gram Positive Cocci (C)  Preliminary   01/04/2022 Gram Positive Cocci (C)  Preliminary   01/04/2022 Gram Positive Cocci (C)  Preliminary   01/04/2022 Gram Positive Cocci (C)  Preliminary     No results found for: URINECX  No results found for: WOUNDCX  No results found for: STOOLCX    I have personally looked at the labs and they are summarized  above.  ----------------------------------------------------------------------------------------------------------------------  Imaging Results (Last 24 Hours)     ** No results found for the last 24 hours. **        ----------------------------------------------------------------------------------------------------------------------  Assessment and Plan:  -Severe sepsis present admission with bacteremia  -Cellulitis right lower extremity  -Alcohol withdrawal  -Acute hypokalemia  -Mild traumatic rhabdomyolysis  -New diagnosis of diabetes  -Alcohol abuse  -Alcoholic hepatitis  -Thrombocytopenia due to alcohol abuse  -Acute kidney injury    Continue with vancomycin and Zosyn, repeat culture ordered, infectious disease consulted, 2D echo will be ordered.  Continue Accu-Cheks and sliding scale, hydration, patient appears to be more awake and able to follow commands, still has tremors.  He is not confused but weak, thiamine folate and B12.  Regarding his sepsis source likely cellulitis and possibly source of bacteremia is the ulcer and cellulitis of the extremity.    Total time spent on this encounter is 32 minutes.    Plan discussed with Claribel Romero RN.    Disposition pending improvement      Hannah Pulido MD  01/06/22  15:03 EST

## 2022-01-06 NOTE — PLAN OF CARE
Goal Outcome Evaluation:  Plan of Care Reviewed With: patient        Progress: no change  Outcome Summary: [place] CIWA protocol maintained, score 11-12 each time, treated with ativan. Electrolyte replacement in place. No signs of distress at this time. Bed alarm set, call light within reach.

## 2022-01-07 NOTE — PLAN OF CARE
"Consult received, chart reviewed.    Per discussion w/ RN prior to SLP entry, pt has been limitedly able to follow commands and is primarily sleeping.      Upon SLP entry pt is sleeping.  He is limitedly aroused w/ min verbal stimulation.  Pt is noted w/ tremors while resting.  Pt does not open eyes but does respond to questions appropriately.  Pt states he is tired and prefers to \"sleep a little while longer\" at this time.  SLP explains purpose of presence in room and relation to hydration and po intake.  Pt reiterates \"Just let me sleep a little more and you can come back later\".  Pt does not open eyes across this exchange.      No po trials administered at this time.  SLP to f/u.    Thank you-  Mariana Maddox MS [facility]-SLP    Goal Outcome Evaluation:        Patient was not wearing a face mask during this therapy encounter. Therapist used appropriate personal protective equipment including mask, eye protection and gloves.  Mask used was standard procedure mask. Appropriate PPE was worn during the entire therapy session. The patient did not cough during this evaluation. Therapist was within 6 feet for less than 15 minutes during the evaluation. Hand hygiene was completed before and after therapy session. Patient is not in enhanced droplet precautions.     "

## 2022-01-07 NOTE — PROGRESS NOTES
Kinetics :  Vancomycin  Day 3    The pre-dose vancomycin level was within the desired goal range for this therapy.  Plan to continue the same for now and monitor with you.

## 2022-01-07 NOTE — PROGRESS NOTES
[facility] HOSPITALIST PROGRESS NOTE     Patient Identification:  Name:  Jose Armando Reich  Age:  56 y.o.  Sex:  male  :  1965  MRN:  [id number]  Visit Number:  [id number]  Primary Care Provider:  Provider, No Known    Length of stay:  2    Subjective: Patient seen and examined assisted by Claribel Romero RN.  Patient is in bed eyes closed but he answers appropriately, he still has some tremors still getting Ativan per CIWA protocol.  He has good articulation when he talks.  Infectious disease was able to evaluate due to bacteremia, help appreciated.    Chief Complaint: Altered mental status  ----------------------------------------------------------------------------------------------------------------------  Current [facility] Meds:  folic acid (FOLVITE) IVPB, 1 mg, Intravenous, Daily  insulin aspart, 0-7 Units, Subcutaneous, 4x Daily AC & at Bedtime  multivitamin IVPB, , Intravenous, Daily  piperacillin-tazobactam, 3.375 g, Intravenous, Q8H  potassium chloride, 40 mEq, Oral, Once  potassium chloride, 10 mEq, Intravenous, Q1H  sodium chloride, 10 mL, Intravenous, Q12H  sodium chloride, 10 mL, Intravenous, Q12H  thiamine (VITAMIN B1) IVPB, 200 mg, Intravenous, Daily  vancomycin, 1,500 mg, Intravenous, Q24H      Pharmacy Consult - Pharmacy to dose,   Pharmacy Consult - Pharmacy to dose,   sodium chloride, 125 mL/hr, Last Rate: 125 mL/hr (22 0234)      ----------------------------------------------------------------------------------------------------------------------  Vital Signs:  Temp:  [97.3 °F (36.3 °C)-97.7 °F (36.5 °C)] 97.7 °F (36.5 °C)  Heart Rate:  [] 99  Resp:  [16] 16  BP: (107-163)/() 154/98       Tele: Sinus rhythm 98 bpm      22  0637 22  0521 22  0516   Weight: 73.9 kg (162 lb 14.4 oz) 74.3 kg (163 lb 12.8 oz) 77.8 kg (171 lb 9.6 oz)     Body mass index is 21.45 kg/m².    Intake/Output Summary (Last 24 hours) at 2022 1087  Last data filed at  "1/7/2022 0516  Gross per 24 hour   Intake 4949.33 ml   Output 1600 ml   Net 3349.33 ml     NPO Diet  ----------------------------------------------------------------------------------------------------------------------  Physical exam:  General: In bed is not orthopneic, he answers questions but rather short, does not want to talk for long time, \"he is tired and wants to sleep \".    Skin:  Skin is warm and dry. No rash noted. No pallor.    Right lower extremity with redness but improved  HENT:  Head:  Normocephalic and atraumatic.  Mouth:  Moist mucous membranes.  No tongue fasciculations   Eyes:  Conjunctivae and EOM are normal.  Pupils are equal, round, and reactive to light.  No scleral icterus.    No nystagmus  Neck:  Neck supple.  No JVD present.    Pulmonary/Chest:  No respiratory distress, no wheezes, no crackles, with normal breath sounds and good air movement.  Cardiovascular:  Normal rate, regular rhythm and normal heart sounds with no murmur.  Abdominal:  Soft.  Bowel sounds are normal.  No distension and no tenderness.   Extremities:  No edema, no tenderness, and no deformity.  No red or swollen joints anywhere.  Strong pulses in all 4 extremities with no clubbing, no cyanosis, no edema.  Right lower extremity redness improved, multiple contusions abrasion and ulcer right ankle lateral aspect clean no drainage no redness  Neurological: Mild tremors both hands motor strength equal no obvious deficit, sensory grossly intact.   No cranial nerve deficit.  No tongue deviation.  No facial droop.  No slurred speech.    Genitourinary: External catheter noted  Back:  ----------------------------------------------------------------------------------------------------------------------  ----------------------------------------------------------------------------------------------------------------------  Results from last 7 days   Lab Units 01/06/22  0124 01/05/22  0850 01/05/22  0315   CK TOTAL U/L 548* 994* " 1,269*   TROPONIN T ng/mL  --   --  <0.010     Results from last 7 days   Lab Units 01/07/22  0035 01/06/22  0124 01/05/22  0850 01/05/22  0603 01/05/22  0603 01/05/22  0231 01/04/22 2112 01/04/22 1752 01/04/22 1752   CRP mg/dL 44.76*  --   --   --  54.03*  --   --   --  55.78*   LACTATE mmol/L  --   --   --   --   --  1.7 2.2*  --  2.8*   WBC 10*3/mm3 8.46 6.28 6.58   < > 9.92  --   --    < > 10.65   HEMOGLOBIN g/dL 10.0* 10.1* 10.5*   < > 10.7*  --   --    < > 12.7*   HEMATOCRIT % 31.4* 31.5* 32.8*   < > 33.4*  --   --    < > 38.3   MCV fL 98.7* 98.7* 97.3*   < > 96.8  --   --    < > 96.0   MCHC g/dL 31.8 32.1 32.0   < > 32.0  --   --    < > 33.2   PLATELETS 10*3/mm3 108* 86* 82*   < > 86*  --   --    < > 91*   INR   --   --   --   --   --   --   --   --  0.96    < > = values in this interval not displayed.     Results from last 7 days   Lab Units 01/05/22  0315   PH, ARTERIAL pH units 7.427   PO2 ART mm Hg 77.9*   PCO2, ARTERIAL mm Hg 22.1*   HCO3 ART mmol/L 14.6*     Results from last 7 days   Lab Units 01/07/22  0706 01/07/22  0035 01/06/22  1733 01/06/22  0124 01/06/22  0124 01/05/22  0850 01/05/22  0603 01/05/22 0603 01/04/22 1752 01/04/22 1752   SODIUM mmol/L  --  153*  --   --  146*  --   --  137   < > 136   POTASSIUM mmol/L 3.6 3.4* 3.2*   < > 2.9*  --    < > 3.7   < > 3.3*   MAGNESIUM mg/dL  --  2.3  --   --  1.9 1.7  --   --    < > 1.9   CHLORIDE mmol/L  --  118*  --   --  107  --   --  96*   < > 85*   CO2 mmol/L  --  12.3*  --   --  12.9*  --   --  12.3*   < > 18.0*   BUN mg/dL  --  29*  --   --  33*  --   --  27*   < > 27*   CREATININE mg/dL  --  1.39*  --   --  1.37*  --   --  1.35*   < > 1.14   EGFR IF NONAFRICN AM mL/min/1.73  --  52*  --   --  53*  --   --  54*   < > 66   CALCIUM mg/dL  --  7.8*  --   --  7.9*  --   --  8.6   < > 9.8   GLUCOSE mg/dL  --  232*  --   --  202*  --   --  262*   < > 292*   ALBUMIN g/dL  --   --   --   --   --   --   --  2.40*  --  3.52   BILIRUBIN mg/dL  --   --    --   --   --   --   --  0.5  --  0.8   ALK PHOS U/L  --   --   --   --   --   --   --  198*  --  288*   AST (SGOT) U/L  --   --   --   --   --   --   --  85*  --  147*   ALT (SGPT) U/L  --   --   --   --   --   --   --  66*  --  102*    < > = values in this interval not displayed.   Estimated Creatinine Clearance: 63.7 mL/min (A) (by C-G formula based on SCr of 1.39 mg/dL (H)).    No results found for: AMMONIA      Blood Culture   Date Value Ref Range Status   01/04/2022 Gram Negative Bacilli (C)  Preliminary   01/04/2022 Gram Positive Cocci (C)  Preliminary   01/04/2022 Gram Positive Cocci (C)  Preliminary   01/04/2022 Gram Positive Cocci (C)  Preliminary   01/04/2022 Gram Positive Cocci (C)  Preliminary     No results found for: URINECX  No results found for: WOUNDCX  No results found for: STOOLCX    I have personally looked at the labs and they are summarized above.  ----------------------------------------------------------------------------------------------------------------------  Imaging Results (Last 24 Hours)     ** No results found for the last 24 hours. **        ----------------------------------------------------------------------------------------------------------------------  Assessment and Plan:  -Acute encephalopathy secondary to alcohol withdrawal  -Alcohol withdrawal  -Acute hypernatremia  -Mild traumatic rhabdomyolysis improved  -Sepsis present on admission with bacteremia  -Diabetes with hyperglycemia new diagnosis  -Acute kidney injury appears to have no signs of improvement nor worsening  -Hepatitis from alcohol-thrombocytopenia improved  -Cellulitis of right lower extremity    Will change fluids to Coreg hypernatremia, unfortunately patient is still not clear for oral intake at this time/free water.  Because of this we will change IV fluids to D5 one fourth NS mindful that he is diabetic, we will continue with sliding scale start on basal Levemir and adjust accordingly.  In the meantime  supportive care wound care, aspiration precaution.  Repeat CPK in the morning.  Will order bladder scan post voiding.    Plan discussed with Claribel Romero RN.    Total time spent with encounter is 32 minutes    Disposition pending improvement      Hannah Pulido MD  01/07/22  11:55 EST

## 2022-01-07 NOTE — PLAN OF CARE
Goal Outcome Evaluation:  Plan of Care Reviewed With: patient, father        Progress: improving  Outcome Summary: VSS. Pt remains on room air. Receiving PRN Ativan per CIWA protocol. Pt to be transferred to PCU when bed becomes available.

## 2022-01-07 NOTE — CASE MANAGEMENT/SOCIAL WORK
Discharge Planning Assessment   Adrien     Patient Name: Jose Armando Reich  MRN: [id number]  Today's Date: 1/7/2022    Admit Date: 1/4/2022     Discharge Plan     Row Name 01/07/22 1128       Plan    Plan Pt admitted 1/4/22. Pt lives at home with father. Pt does not utilize home health services or DME. Pt may need psych consult when medically stable. SS to follow up with pt regarding alcohol abuse resources. SS to continue to follow and assist.              WILLIAM Tyson

## 2022-01-07 NOTE — CONSULTS
INFECTIOUS DISEASE CONSULTATION REPORT        Patient Identification:  Name:  Jose Armando Reich  Age:  56 y.o.  Sex:  male  :  1965  MRN:  [id number]   Visit Number:  [id number]  Primary Care Physician:  Provider, No Known       LOS: 2 days        Subjective       Subjective     History of present illness:      Thank you Dr. Pulido for allowing us to participate in the care of your patient.  As you well know, Mr. Jose Armando Reich is a 56 y.o. male with past medical history significant for alcoholism, who presented to [facility] Emergency Department on 2022 after being found on the floor after being intoxicated. WBC normal. CRP improving at 44.76. MRSA PCR negative. COVID-19 and influenza PCR negative. Lactic 2.2 on admission. Urinalysis unremarkable for infection. Blood cultures from 22 1 out 2 sets positive for Enterococcus faecalis, Staph species not aureus or lugdunesis, Streptococcus not A B or pneumoniae, Proteus species. Blood cultures from 22 1 out 2 sets staph species not aureus or lugdensis, and streptococcus species not A, B or pneumoniae. CT of the abdomen and pelvis from 22 reports diffuse fatty change of the liver. CT Chest from 22 unremarkable. CT of the facial bones from 22 reports significant periapical lucency seen on the lower left and the mandible. CT of the head from 22 reports significant periapical lucency seen on the lower left and the mandible.      Infectious Disease consultation was requested for antimicrobial management.      ---------------------------------------------------------------------------------------------------------------------     Review Of Systems:    Unable to obtain due to mental status.    ---------------------------------------------------------------------------------------------------------------------     Past Medical History    Past Medical History:   Diagnosis Date   • Alcohol abuse    • Alcoholism (HCC)         Past Surgical History    Past Surgical History:   Procedure Laterality Date   • WISDOM TOOTH EXTRACTION         Family History    History reviewed. No pertinent family history.    Social History    Social History     Tobacco Use   • Smoking status: Not on file   • Smokeless tobacco: Not on file   Substance Use Topics   • Alcohol use: Yes     Alcohol/week: 12.0 standard drinks     Types: 12 Shots of liquor per week   • Drug use: Yes     Types: Other     Comment: ETOH       Allergies    Patient has no known allergies.  ---------------------------------------------------------------------------------------------------------------------     Home Medications:    Prior to Admission Medications       None          ---------------------------------------------------------------------------------------------------------------------    Objective       Objective     Hospital Scheduled Meds:  folic acid (FOLVITE) IVPB, 1 mg, Intravenous, Daily  insulin aspart, 0-7 Units, Subcutaneous, 4x Daily AC & at Bedtime  multivitamin IVPB, , Intravenous, Daily  piperacillin-tazobactam, 3.375 g, Intravenous, Q8H  potassium chloride, 40 mEq, Oral, Once  potassium chloride, 10 mEq, Intravenous, Q1H  potassium phosphate infusion greater than 15 mMoles, 45 mmol, Intravenous, Once  sodium chloride, 10 mL, Intravenous, Q12H  sodium chloride, 10 mL, Intravenous, Q12H  thiamine (VITAMIN B1) IVPB, 200 mg, Intravenous, Daily  vancomycin, 1,500 mg, Intravenous, Q24H      Pharmacy Consult - Pharmacy to dose,   Pharmacy Consult - Pharmacy to dose,   sodium chloride, 125 mL/hr, Last Rate: 125 mL/hr (01/07/22 0234)      ---------------------------------------------------------------------------------------------------------------------   Vital Signs:  Temp:  [97.3 °F (36.3 °C)-97.7 °F (36.5 °C)] 97.7 °F (36.5 °C)  Heart Rate:  [] 99  Resp:  [16] 16  BP: (107-163)/() 154/98  Mean Arterial Pressure (Non-Invasive) for the past 24 hrs (Last  3 readings):   Noninvasive MAP (mmHg)   01/07/22 0732 126   01/07/22 0702 114   01/07/22 0602 110     SpO2 Percentage    01/07/22 0602 01/07/22 0702 01/07/22 0732   SpO2: 98% 97% 97%     SpO2:  [92 %-99 %] 97 %  on   ;   Device (Oxygen Therapy): room air    Body mass index is 21.45 kg/m².  Wt Readings from Last 3 Encounters:   01/07/22 77.8 kg (171 lb 9.6 oz)     ---------------------------------------------------------------------------------------------------------------------     Physical Exam:    Constitutional:  Well-developed and well-nourished.  No respiratory distress.      HENT:  Head: Normocephalic and atraumatic.  Mouth:  Moist mucous membranes.    Eyes:  Conjunctivae and EOM are normal.  No scleral icterus.  Neck:  Neck supple.  No JVD present.    Cardiovascular:  Normal rate, regular rhythm and normal heart sounds with no murmur. No edema.  Pulmonary/Chest:  No respiratory distress, no wheezes, no crackles, with normal breath sounds and good air movement.  Abdominal:  Soft.  Bowel sounds are normal.  No distension and no tenderness.   Musculoskeletal:  No edema, no tenderness, and no deformity.  No swelling or redness of joints.  Neurological: Mildly confused. No facial droop.  No slurred speech.   Skin:  Skin is warm and dry.  RLE erythema and warmth, but improving. Scattered ulcers and abrasions.   Psychiatric:  Normal mood and affect.  Behavior is normal.    ---------------------------------------------------------------------------------------------------------------------    Results from last 7 days   Lab Units 01/06/22  0124 01/05/22  0850 01/05/22 0315   CK TOTAL U/L 548* 994* 1,269*   TROPONIN T ng/mL  --   --  <0.010         Results from last 7 days   Lab Units 01/05/22 0315   PH, ARTERIAL pH units 7.427   PO2 ART mm Hg 77.9*   PCO2, ARTERIAL mm Hg 22.1*   HCO3 ART mmol/L 14.6*     Results from last 7 days   Lab Units 01/07/22  0035 01/06/22  0124 01/05/22  0850 01/05/22  0603 01/05/22 0603  01/05/22  0231 01/04/22 2112 01/04/22 1752 01/04/22 1752   CRP mg/dL 44.76*  --   --   --  54.03*  --   --   --  55.78*   LACTATE mmol/L  --   --   --   --   --  1.7 2.2*  --  2.8*   WBC 10*3/mm3 8.46 6.28 6.58   < > 9.92  --   --    < > 10.65   HEMOGLOBIN g/dL 10.0* 10.1* 10.5*   < > 10.7*  --   --    < > 12.7*   HEMATOCRIT % 31.4* 31.5* 32.8*   < > 33.4*  --   --    < > 38.3   MCV fL 98.7* 98.7* 97.3*   < > 96.8  --   --    < > 96.0   MCHC g/dL 31.8 32.1 32.0   < > 32.0  --   --    < > 33.2   PLATELETS 10*3/mm3 108* 86* 82*   < > 86*  --   --    < > 91*   INR   --   --   --   --   --   --   --   --  0.96    < > = values in this interval not displayed.     Results from last 7 days   Lab Units 01/07/22  0706 01/07/22  0035 01/06/22 1733 01/06/22  0124 01/06/22  0124 01/05/22  0850 01/05/22  0603 01/05/22 0603 01/04/22 1752 01/04/22 1752   SODIUM mmol/L  --  153*  --   --  146*  --   --  137   < > 136   POTASSIUM mmol/L 3.6 3.4* 3.2*   < > 2.9*  --    < > 3.7   < > 3.3*   MAGNESIUM mg/dL  --  2.3  --   --  1.9 1.7  --   --    < > 1.9   CHLORIDE mmol/L  --  118*  --   --  107  --   --  96*   < > 85*   CO2 mmol/L  --  12.3*  --   --  12.9*  --   --  12.3*   < > 18.0*   BUN mg/dL  --  29*  --   --  33*  --   --  27*   < > 27*   CREATININE mg/dL  --  1.39*  --   --  1.37*  --   --  1.35*   < > 1.14   EGFR IF NONAFRICN AM mL/min/1.73  --  52*  --   --  53*  --   --  54*   < > 66   CALCIUM mg/dL  --  7.8*  --   --  7.9*  --   --  8.6   < > 9.8   GLUCOSE mg/dL  --  232*  --   --  202*  --   --  262*   < > 292*   ALBUMIN g/dL  --   --   --   --   --   --   --  2.40*  --  3.52   BILIRUBIN mg/dL  --   --   --   --   --   --   --  0.5  --  0.8   ALK PHOS U/L  --   --   --   --   --   --   --  198*  --  288*   AST (SGOT) U/L  --   --   --   --   --   --   --  85*  --  147*   ALT (SGPT) U/L  --   --   --   --   --   --   --  66*  --  102*    < > = values in this interval not displayed.   Estimated Creatinine Clearance:  63.7 mL/min (A) (by C-G formula based on SCr of 1.39 mg/dL (H)).  No results found for: AMMONIA    Hemoglobin A1C   Date/Time Value Ref Range Status   01/04/2022 1752 7.60 (H) 4.80 - 5.60 % Final     Glucose   Date/Time Value Ref Range Status   01/07/2022 0513 233 (H) 70 - 130 mg/dL Final     Comment:     Meter: [id number] : [id number] APRIL MEDLEY   01/06/2022 2327 225 (H) 70 - 130 mg/dL Final     Comment:     Meter: [id number] : [id number] APRIL MEDLEY   01/06/2022 1735 165 (H) 70 - 130 mg/dL Final     Comment:     Meter: [id number] : [id number] YouOS   01/06/2022 1337 217 (H) 70 - 130 mg/dL Final     Comment:     Meter: [id number] : [id number] YouOS   01/06/2022 0515 230 (H) 70 - 130 mg/dL Final     Comment:     Meter: [id number] : [id number] APRIL MEDLEY   01/06/2022 0010 197 (H) 70 - 130 mg/dL Final     Comment:     Meter: [id number] : [id number] APRIL MEDLEY   01/05/2022 2044 144 (H) 70 - 130 mg/dL Final     Comment:     Meter: [id number] : [id number] APRIL MEDLEY   01/05/2022 1710 194 (H) 70 - 130 mg/dL Final     Comment:     Meter: [id number] : [id number] Oumar VELASQUEZ     Lab Results   Component Value Date    HGBA1C 7.60 (H) 01/04/2022     No results found for: TSH, FREET4    Blood Culture   Date Value Ref Range Status   01/04/2022 Gram Negative Bacilli (C)  Preliminary   01/04/2022 Gram Positive Cocci (C)  Preliminary   01/04/2022 Gram Positive Cocci (C)  Preliminary   01/04/2022 Gram Positive Cocci (C)  Preliminary   01/04/2022 Gram Positive Cocci (C)  Preliminary     No results found for: URINECX  No results found for: WOUNDCX  No results found for: STOOLCX  No results found for: RESPCX  Pain Management Panel       Pain Management Panel Latest Ref Rng & Units 1/4/2022    AMPHETAMINES SCREEN, URINE Negative Negative    BARBITURATES SCREEN Negative Negative    BENZODIAZEPINE SCREEN, URINE Negative Negative    BUPRENORPHINEUR Negative Negative    COCAINE SCREEN,  URINE Negative Negative    METHADONE SCREEN, URINE Negative Negative    METHAMPHETAMINEUR Negative Negative          I have personally reviewed the above laboratory results.   ---------------------------------------------------------------------------------------------------------------------  Imaging Results (Last 7 Days)       Procedure Component Value Units Date/Time    CT Chest Without Contrast Diagnostic [[id number]] Collected: 01/05/22 0347     Updated: 01/05/22 0349    Narrative:      CT Chest WO    INDICATION:   Sepsis today    TECHNIQUE:   CT of the thorax without IV contrast. Coronal and sagittal reconstructions were obtained.  Radiation dose reduction techniques included automated exposure control or exposure modulation based on body size. Count of known CT and cardiac nuc med studies  performed in previous 12 months: 0.     COMPARISON:   None available.    FINDINGS:  Lungs are clear. Airways are patent. Aorta is normal in size. There is no adenopathy. Thyroid gland appears normal. Bones are unremarkable.      Impression:      Negative CT of the chest.    Signer Name: Jim Stanton MD   Signed: 1/5/2022 3:47 AM   Workstation Name: [email or web address]    [facility]    CT Abdomen Pelvis Without Contrast [[id number]] Collected: 01/05/22 0346     Updated: 01/05/22 0348    Narrative:      CT Abdomen Pelvis WO    INDICATION:   Sepsis today    TECHNIQUE:   CT of the abdomen and pelvis without IV contrast. Coronal and sagittal reconstructions were obtained.  Radiation dose reduction techniques included automated exposure control or exposure modulation based on body size. Count of known CT and cardiac nuc  med studies performed in previous 12 months: 0.     COMPARISON:   None available.    FINDINGS:  Abdomen: There is diffuse fatty change throughout the liver. The gallbladder is normal. The spleen, pancreas, adrenal glands and kidneys are normal in appearance. Aorta is normal in size. The bowel  including the appendix appears normal.    Pelvis: The bladder, prostate gland are normal. Bones are unremarkable.      Impression:      Diffuse fatty change of liver. Otherwise negative CT abdomen pelvis          Signer Name: Jim Stanton MD   Signed: 1/5/2022 3:46 AM   Workstation Name: [email or web address]    [facility]    CT Head Without Contrast [[id number]] Collected: 01/04/22 1905     Updated: 01/04/22 1907    Narrative:      . PROCEDURE: CT Head WO, CT Max Facial Area WO    INDICATIONS: Closed head injury, fall and facial injury.    COMPARISON: No relevant comparison or correlation studies available at time of dictation.          Radiation dose reduction techniques included automated exposure control or exposure modulation based on body size.   Count of known CT and cardiac nuc med studies performed in previous 12 months: 1.     1.  CT HEAD:    FINDINGS:  CT examination of the brain is performed without IV contrast. Coronal and sagittal reconstructions were obtained.  There is no evidence of acute intracranial hemorrhage, mass effect or midline shift. No intra-axial or extra-axial fluid  collections. The ventricular system is normal.  The calvarium is intact.         Impression:      No acute intracranial trauma      2.  FACIAL CT:    TECHNIQUE:  Routine CT of the facial bones is performed without contrast with direct axial acquisition. Sagittal and coronal reconstructions are performed on separate workstation and reviewed.         FINDINGS:  No facial bone fractures are present. Orbital rims appear intact. Mandible is intact without evidence of temporomandibular dislocation. No definite nasal bone fracture and no soft tissue swelling.    Paranasal sinuses are well aerated and clear. Poor dentition noted with large left periapical lucency, correlate with visual inspection. Dental caries and multiple mild periapical lucencies seen in the maxilla. Large radiopaque density seen lateral to  the left  orbit.      IMPRESSION:  No evidence of facial bone fracture. However, significant periapical lucency seen on the lower left and the mandible, correlate with visual inspection of the mouth, this is not related to trauma.               Signer Name: Rosa Rapp MD   Signed: 1/4/2022 7:05 PM   Workstation Name: [email or web address]    [facility]    CT Facial Bones Without Contrast [[id number]] Collected: 01/04/22 1905     Updated: 01/04/22 1907    Narrative:      . PROCEDURE: CT Head WO, CT Max Facial Area WO    INDICATIONS: Closed head injury, fall and facial injury.    COMPARISON: No relevant comparison or correlation studies available at time of dictation.          Radiation dose reduction techniques included automated exposure control or exposure modulation based on body size.   Count of known CT and cardiac nuc med studies performed in previous 12 months: 1.     1.  CT HEAD:    FINDINGS:  CT examination of the brain is performed without IV contrast. Coronal and sagittal reconstructions were obtained.  There is no evidence of acute intracranial hemorrhage, mass effect or midline shift. No intra-axial or extra-axial fluid  collections. The ventricular system is normal.  The calvarium is intact.         Impression:      No acute intracranial trauma      2.  FACIAL CT:    TECHNIQUE:  Routine CT of the facial bones is performed without contrast with direct axial acquisition. Sagittal and coronal reconstructions are performed on separate workstation and reviewed.         FINDINGS:  No facial bone fractures are present. Orbital rims appear intact. Mandible is intact without evidence of temporomandibular dislocation. No definite nasal bone fracture and no soft tissue swelling.    Paranasal sinuses are well aerated and clear. Poor dentition noted with large left periapical lucency, correlate with visual inspection. Dental caries and multiple mild periapical lucencies seen in the maxilla. Large radiopaque  density seen lateral to  the left orbit.      IMPRESSION:  No evidence of facial bone fracture. However, significant periapical lucency seen on the lower left and the mandible, correlate with visual inspection of the mouth, this is not related to trauma.               Signer Name: Rosa Rapp MD   Signed: 1/4/2022 7:05 PM   Workstation Name: [email or web address]    [facility]          I have personally reviewed the above radiology results.   ---------------------------------------------------------------------------------------------------------------------      Assessment & Plan        Assessment/Plan       ASSESSMENT:    1. Severe sepsis with lactic acid greater than 2 on admission  2. Bacteremia  3. Cellulitis right lower extremitiy    PLAN:    Patient presents after being found on the floor after being intoxicated. WBC normal. CRP improving at 44.76. MRSA PCR negative. COVID-19 and influenza PCR negative. Lactic 2.2 on admission. Urinalysis unremarkable for infection. Blood cultures from 1/4/22 1 out 2 sets positive for Enterococcus faecalis, Staph species not aureus or lugdunesis, Streptococcus not A B or pneumoniae, Proteus species. Blood cultures from 1/4/22 1 out 2 sets staph species not aureus or lugdensis, and streptococcus species not A, B or pneumoniae. CT of the abdomen and pelvis from 1/5/22 reports diffuse fatty change of the liver. CT Chest from 1/5/22 unremarkable. CT of the facial bones from 1/4/22 reports significant periapical lucency seen on the lower left and the mandible. CT of the head from 1/4/22 reports significant periapical lucency seen on the lower left and the mandible.    Patient has multiple possible sources for bacteremia. We are concerned about the possibility of aspiration pneumonia and cellulitis of RLE.     Recommend repeat blood cultures x2. Recommend 2D Echo.     For now recommend to continue Vancomycin and Zosyn. Recommend to monitor creatinine closely.  Unfortunately due to elevated CK unable to transition Vancomycin to Daptomycin. We will continue to follow closely and adjust antibiotic therapy as needed.     Again, thank you Dr. Pulido for allowing us to participate in the care of your patient and please feel free to call for any questions you may have.        Code Status:     Code Status and Medical Interventions:   Ordered at: 01/05/22 0246     Level Of Support Discussed With:    Patient     Code Status (Patient has no pulse and is not breathing):    CPR (Attempt to Resuscitate)     Medical Interventions (Patient has pulse or is breathing):    Full Support     Scribed for Dr. Ladonna Chavez MD by ANI New  01/07/22 10:39 EST          ANI New  01/07/22  10:39 EST    Physician Attestation:    The documentation recorded by the scribe accurately reflects the service I personally performed and the decisions made by me.    Ladonna Chavez MD  01/08/22  06:12 EST

## 2022-01-08 NOTE — CONSULTS
[facility]   Consult Note    Patient Name: Jose Armando Reich  : 1965  MRN: [id number]  Primary Care Physician:  Provider, No Known  Referring Physician: No Known Provider  Date of admission: 2022    Consults  Subjective   Subjective     Reason for Consult/ Chief Complaint: IV access, alcohol withdrawls    History of Present Illness  Jose Armando Reich is a 56 y.o. male who was admitted with alcohol withdrawal a few days ago. He has been in DTs and had aspirated and coded this morning. He needs IV access for pressors. His PT has been normal and platelet count normal.     Review of Systems Pt sedated and intubated    Personal History     Past Medical History:   Diagnosis Date   • Alcohol abuse    • Alcoholism (HCC)        Past Surgical History:   Procedure Laterality Date   • WISDOM TOOTH EXTRACTION         Family History: family history is not on file. Otherwise pertinent FHx was reviewed and not pertinent to current issue.    Social History:  reports current alcohol use of about 12.0 standard drinks of alcohol per week. He reports current drug use. Drug: Other.    Home Medications:        Allergies:  No Known Allergies    Objective    Objective     Vitals:  Temp:  [97.3 °F (36.3 °C)-102.4 °F (39.1 °C)] 102.4 °F (39.1 °C)  Heart Rate:  [] 98  Resp:  [18-28] 26  BP: (106-162)/(65-97) 112/66  Flow (L/min):  [2-15] 15    Physical Exam  Constitutional:       Appearance: He is ill-appearing and toxic-appearing.   HENT:      Head: Normocephalic.      Right Ear: External ear normal.      Left Ear: External ear normal.      Nose: Nose normal.      Mouth/Throat:      Pharynx: No oropharyngeal exudate or posterior oropharyngeal erythema.   Eyes:      General: No scleral icterus.  Pulmonary:      Breath sounds: Rhonchi present. No rales.   Abdominal:      General: Abdomen is flat.      Palpations: Abdomen is soft.   Musculoskeletal:         General: Swelling present.      Cervical back: Normal range of  motion.   Skin:     Findings: Bruising present.         Result Review    Result Review:  I have personally reviewed the results from the time of this admission to 1/8/2022 12:09 EST and agree with these findings:  []  Laboratory  []  Microbiology  []  Radiology  []  EKG/Telemetry   []  Cardiology/Vascular   []  Pathology  []  Old records  []  Other:  Most notable findings include:      Assessment/Plan   Assessment / Plan     Brief Patient Summary:  Jose Armando Reich is a 56 y.o. male who needs a central line.     Active Hospital Problems:  Active Hospital Problems    Diagnosis    • Contusion of face    • Alcohol withdrawal (HCC)      Plan: place a central line      Virgil Wilde MD

## 2022-01-08 NOTE — PLAN OF CARE
Goal Outcome Evaluation:  Plan of Care Reviewed With: patient        Progress: no change  Outcome Summary: Patient transferred from CCU this shift. Upon arrival, patient became very agitated and tremors increased. CIWA scored and prn ativan given per CIWA protocol. Patient has since calmed. Patient alert to voice/touch and can answer some orientation questions, though does not follow commands. VSS, afebrile. Seizure precautions maintained. Safety precautions maintained. Patient room near unit station. Patient is currently resting in bed, call light in reach.

## 2022-01-08 NOTE — PLAN OF CARE
F/u w/ pt this am at bedside in PCU-205. Pt is minimally arousable. He is observed w/ hands/arms drawn, eyes closed, and mumbling unintelligible utterances. He does not respond to SLP verbal interactions, raising HOB, or turning lights on. Pt is noted w/ continued resting tremors.     Per discussion w/ RN prior to SLP entry, pt has been limitedly able to follow commands and is primarily sleeping.      Per overall medical status and unable to arouse for safe, functional participation, no po trials administered at this time.  SLP to f/u as warranted.    RN updated by SLP on pt status and SLP recommendations for continued NPO status. Dr Wood made aware for continued NPO recommendations. Both report verbal understanding and agreement.     Thank you-  Maverick Gill MA CCC-SLP    Goal Outcome Evaluation:        Patient was not wearing a face mask during this therapy encounter. Therapist used appropriate personal protective equipment including mask, eye protection and gloves.  Mask used was standard procedure mask. Appropriate PPE was worn during the entire therapy session. The patient did not cough during this evaluation. Therapist was within 6 feet for less than 15 minutes during the evaluation. Hand hygiene was completed before and after therapy session. Patient is not in enhanced droplet precautions.

## 2022-01-08 NOTE — SIGNIFICANT NOTE
Patient transferred to CCU level of care room 219 from .  Patient's glasses, masks and socks in patient closet.

## 2022-01-08 NOTE — PROGRESS NOTES
[facility]     Patient Identification:  Name:  Jose Armando Reich  Age:  56 y.o.  Sex:  male  :  1965  MRN:  [id number]  Visit Number:  [id number]  Primary Care Provider:  Provider, No Known    Length of stay:  3    Subjective: Patient seen and examined assisted by Sara Atkinson RN.  Patient was very lethargic, significant guarding and rattling of the chest actively aspirating.  Suctioning was done including NT suctioning.  Patient still lethargic.  Became hypoxic requiring increased FiO2.  Eventually patient's inspiratory effort became shallow, desaturating.  Rapid response was called patient the became pulseless briefly, while code to was called while patient was on Ambu bagged he received 1 amp epinephrine, approximately 20 seconds of pressure was performed, ROSC achieved.  Bolus IV fluids ordered.  There was difficulty with intubation by ER physician Dr. Rainey, help appreciated.  LMA was temporarily placed until anesthesia was able to come and help, subsequently patient was intubated by anesthesia successfully.  Central line was placed, OG tube was placed.      Chief Complaint: Status post code  ----------------------------------------------------------------------------------------------------------------------  Current Hospital Meds:  cefTRIAXone, 2 g, Intravenous, Q24H  enoxaparin, 40 mg, Subcutaneous, Q24H  folic acid (FOLVITE) IVPB, 1 mg, Intravenous, Daily  insulin aspart, 0-7 Units, Subcutaneous, TID AC  insulin detemir, 15 Units, Subcutaneous, Daily  multivitamin IVPB, , Intravenous, Daily  pantoprazole, 40 mg, Intravenous, Q AM  potassium chloride, 40 mEq, Oral, Once  potassium phosphate infusion greater than 15 mMoles, 45 mmol, Intravenous, Once  Propofol, , ,   sodium chloride, 10 mL, Intravenous, Q12H  sodium chloride, 10 mL, Intravenous, Q12H  thiamine (VITAMIN B1) IVPB, 200 mg, Intravenous, Daily  vancomycin, 1,500 mg, Intravenous,  Q24H      dextrose, 100 mL/hr, Last Rate: 100 mL/hr (01/08/22 0321)  norepinephrine, 0.02-0.3 mcg/kg/min  Pharmacy Consult - Pharmacy to dose,   Pharmacy Consult - Pharmacy to dose,   propofol, 5-50 mcg/kg/min      ----------------------------------------------------------------------------------------------------------------------  Vital Signs:  Temp:  [97.3 °F (36.3 °C)-102.4 °F (39.1 °C)] 102.4 °F (39.1 °C)  Heart Rate:  [] 98  Resp:  [18-28] 26  BP: (106-162)/(65-97) 112/66       Tele: Sinus tachycardia 103 bpm O2 saturation is 97% on FiO2 of 50% on vent      01/06/22  0521 01/07/22  0516 01/08/22  0502   Weight: 74.3 kg (163 lb 12.8 oz) 77.8 kg (171 lb 9.6 oz) 77.8 kg (171 lb 9.6 oz)     Body mass index is 21.45 kg/m².    Intake/Output Summary (Last 24 hours) at 1/8/2022 1213  Last data filed at 1/8/2022 0829  Gross per 24 hour   Intake 2657.68 ml   Output 3750 ml   Net -1092.32 ml     NPO Diet  ----------------------------------------------------------------------------------------------------------------------  Physical exam:  General: Currently unresponsive sedated, on ventilator, breathing with vent setting of 20.  Chronically ill-appearing,   Skin: Initially patient was cyanotic and cold now he is warm, redness and cellulitis of right lower extremity improved   HENT: ET tube OG tube in place   Eyes:  Conjunctivae and EOM are normal.  Pupils are equal, round, and reactive to light.  No scleral icterus.    Neck:  Neck supple.  No JVD present.    Pulmonary/Chest:  No respiratory distress, no wheezes, no crackles, with normal breath sounds and good air movement.  Cardiovascular:  Normal rate, regular rhythm and normal heart sounds with no murmur.  Abdominal: There is prominence right flank appears soft, appears to be air-filled colon.  At least on exam.  Bowel sounds present, no guarding no rigidity.  Extremities: Trace edema on both lower extremity, cellulitis of right lower extremity improved,  initially pulse was decreased and cold and clammy with cyanosis now improved, normal cyanosis, pedal pulses and radial pulse are strong.    Neurological: Patient unresponsive, sedated,   Genitourinary: Ya catheter with pale fiorella urine  Back:  ----------------------------------------------------------------------------------------------------------------------  ----------------------------------------------------------------------------------------------------------------------  Results from last 7 days   Lab Units 01/08/22  0038 01/06/22  0124 01/05/22  0850 01/05/22  0315 01/05/22  0315   CK TOTAL U/L 131 548* 994*   < > 1,269*   TROPONIN T ng/mL  --   --   --   --  <0.010    < > = values in this interval not displayed.     Results from last 7 days   Lab Units 01/08/22  0038 01/07/22  0035 01/06/22  0124 01/05/22  0850 01/05/22  0603 01/05/22  0231 01/04/22 2112 01/04/22 1752 01/04/22 1752   CRP mg/dL  --  44.76*  --   --  54.03*  --   --   --  55.78*   LACTATE mmol/L  --   --   --   --   --  1.7 2.2*  --  2.8*   WBC 10*3/mm3 9.07 8.46 6.28   < > 9.92  --   --    < > 10.65   HEMOGLOBIN g/dL 10.6* 10.0* 10.1*   < > 10.7*  --   --    < > 12.7*   HEMATOCRIT % 32.2* 31.4* 31.5*   < > 33.4*  --   --    < > 38.3   MCV fL 95.0 98.7* 98.7*   < > 96.8  --   --    < > 96.0   MCHC g/dL 32.9 31.8 32.1   < > 32.0  --   --    < > 33.2   PLATELETS 10*3/mm3 135* 108* 86*   < > 86*  --   --    < > 91*   INR   --   --   --   --   --   --   --   --  0.96    < > = values in this interval not displayed.     Results from last 7 days   Lab Units 01/05/22  0315   PH, ARTERIAL pH units 7.427   PO2 ART mm Hg 77.9*   PCO2, ARTERIAL mm Hg 22.1*   HCO3 ART mmol/L 14.6*     Results from last 7 days   Lab Units 01/08/22  1041 01/08/22  0038 01/07/22  1952 01/07/22  0706 01/07/22  0035 01/06/22  1733 01/06/22  0124 01/05/22  0850 01/05/22  0603 01/04/22  1752 01/04/22  1752   SODIUM mmol/L 166* 163*  --   --  153*  --  146*   < > 137   < >  136   POTASSIUM mmol/L 3.5 3.1* 3.8   < > 3.4*   < > 2.9*   < > 3.7   < > 3.3*   MAGNESIUM mg/dL  --  1.8  --   --  2.3  --  1.9   < >  --   --  1.9   CHLORIDE mmol/L 131* 129*  --   --  118*  --  107   < > 96*   < > 85*   CO2 mmol/L 22.9 20.2*  --   --  12.3*  --  12.9*   < > 12.3*   < > 18.0*   BUN mg/dL 25* 22*  --   --  29*  --  33*   < > 27*   < > 27*   CREATININE mg/dL 1.64* 1.22  --   --  1.39*  --  1.37*   < > 1.35*   < > 1.14   EGFR IF NONAFRICN AM mL/min/1.73 43* 61  --   --  52*  --  53*   < > 54*   < > 66   CALCIUM mg/dL 7.9* 7.7*  --   --  7.8*  --  7.9*   < > 8.6   < > 9.8   GLUCOSE mg/dL 148* 139*  --   --  232*  --  202*   < > 262*   < > 292*   ALBUMIN g/dL  --   --   --   --   --   --   --   --  2.40*  --  3.52   BILIRUBIN mg/dL  --   --   --   --   --   --   --   --  0.5  --  0.8   ALK PHOS U/L  --   --   --   --   --   --   --   --  198*  --  288*   AST (SGOT) U/L  --   --   --   --   --   --   --   --  85*  --  147*   ALT (SGPT) U/L  --   --   --   --   --   --   --   --  66*  --  102*    < > = values in this interval not displayed.   Estimated Creatinine Clearance: 54 mL/min (A) (by C-G formula based on SCr of 1.64 mg/dL (H)).    No results found for: AMMONIA      Blood Culture   Date Value Ref Range Status   01/04/2022 Proteus mirabilis (C)  Final   01/04/2022 Streptococcus dysgalactiae ssp equisimilis (C)  Final   01/04/2022 Enterococcus faecalis (C)  Final     Comment:     Infectious disease consultation is highly recommended.   01/04/2022 Gram Negative Bacilli (C)  Final   01/04/2022 Streptococcus dysgalactiae ssp equisimilis (C)  Final   01/04/2022 Enterococcus faecalis (C)  Final     Comment:     Infectious disease consultation is highly recommended.   01/04/2022 Alcaligenes faecalis ssp faecalis (C)  Final   01/04/2022 Staphylococcus, coagulase negative (C)  Final     Comment:     Probable contaminant requires clinical correlation, susceptibility not performed unless requested by  physician.       No results found for: URINECX  No results found for: WOUNDCX  No results found for: STOOLCX    I have personally looked at the labs and they are summarized above.  ----------------------------------------------------------------------------------------------------------------------  Imaging Results (Last 24 Hours)     Procedure Component Value Units Date/Time    XR Chest 1 View [[id number]] Resulted: 01/08/22 1136     Updated: 01/08/22 1212        ----------------------------------------------------------------------------------------------------------------------  Assessment and Plan:  -Status post code for PEA, with ROSC after 1 minute of compressions and 1 epi  -sepsis with bacteremia  -Alcohol withdrawal with DT  -Aspiration pneumonia with appearance of new infiltrate right infrahilar.  -Severe hyponatremia  -Mild traumatic rhabdomyolysis  -Newly diagnosed diabetes type 2 with hyperglycemia  -Cellulitis right lower extremity  -Acute kidney injury  -Thrombocytopenia alcohol-related  -Acute hypokalemia hyponatremia hypophosphatemia and hypomagnesemia actively being corrected    Patient is a resecured now on ventilator, pressor support as needed, now with OG tube in place will address hypernatremia through oral, nephrology help appreciated, DVT and GI prophylaxis will discussed with infectious disease regarding antibiotic coverage noted Zosyn changed to Rocephin, will consider adding Flagyl if ID is agreeable.  We will update family, check blood gas, adjust ventilator as needed.  Start the patient on propofol and fentanyl as needed.  Temporarily will place the OG tube on intermittent suction and get KUB to evaluate the abdominal distention which I suspect is secondary to air from bagging and LMA.      Plan discussed with Sara Atkinson, RN, Milly Keller RN.    Total critical time spent on this encounter is 55 minutes.    Disposition pending improvement      Hannah Pulido MD  01/08/22  12:13  EST

## 2022-01-08 NOTE — PROCEDURES
IV access, alcohol withdrawal    Physician Dr Wilde    Procedure  The left neck and chest was prepped and draped. The left lower neck had local injected. The IJ was accessed and wire threaded in easily. The dilator was passed and the central line placed. All ports flushed easily. A CXR is pending.

## 2022-01-08 NOTE — PROGRESS NOTES
PROGRESS NOTE         Patient Identification:  Name:  Jose Armando Reich  Age:  56 y.o.  Sex:  male  :  1965  MRN:  [id number]  Visit Number:  [id number]  Primary Care Provider:  Provider, No Known         LOS: 3 days       ----------------------------------------------------------------------------------------------------------------------  Subjective       Chief Complaints:    Fall        Interval History:      Patient remains drowsy this morning.  On increased oxygen requirement of 6 L bubble high flow this morning.  Fever of 102 this morning.  WBC normal.  CK now normalized at 131.  Creatinine improved at 1.22.  Blood cultures from 2022 finalized as 1 out of 2 sets positive for Streptococcus dysgalactiae, Enterococcus faecalis, Alcaligenes faecalis, coagulase-negative Staphylococcus and noted 2 sets positive for Proteus mirabilis, Streptococcus dysgalactiae, Enterococcus faecalis.     Review of Systems:    Unable to obtain due to mental status.  ----------------------------------------------------------------------------------------------------------------------      Objective       Current Hospital Meds:  cefTRIAXone, 2 g, Intravenous, Q24H  enoxaparin, 40 mg, Subcutaneous, Q24H  folic acid (FOLVITE) IVPB, 1 mg, Intravenous, Daily  insulin aspart, 0-7 Units, Subcutaneous, TID AC  insulin detemir, 15 Units, Subcutaneous, Daily  multivitamin IVPB, , Intravenous, Daily  pantoprazole, 40 mg, Intravenous, Q AM  potassium chloride, 40 mEq, Oral, Once  Propofol, , ,   sodium chloride, 10 mL, Intravenous, Q12H  sodium chloride, 10 mL, Intravenous, Q12H  thiamine (VITAMIN B1) IVPB, 200 mg, Intravenous, Daily  vancomycin, 1,500 mg, Intravenous, Q24H      dextrose, 100 mL/hr, Last Rate: 100 mL/hr (22 0321)  norepinephrine, 0.02-0.3 mcg/kg/min  Pharmacy Consult - Pharmacy to dose,   Pharmacy Consult - Pharmacy to dose,   propofol, 5-50  mcg/kg/min      ----------------------------------------------------------------------------------------------------------------------    Vital Signs:  Temp:  [97.3 °F (36.3 °C)-102.4 °F (39.1 °C)] 102.4 °F (39.1 °C)  Heart Rate:  [] 98  Resp:  [18-28] 26  BP: (106-162)/(65-97) 112/66  Mean Arterial Pressure (Non-Invasive) for the past 24 hrs (Last 3 readings):   Noninvasive MAP (mmHg)   01/08/22 1002 79   01/08/22 0915 94   01/08/22 0702 93     SpO2 Percentage    01/08/22 1002 01/08/22 1010 01/08/22 1051   SpO2: 97% 97% 97%     SpO2:  [79 %-98 %] 97 %  on  Flow (L/min):  [2-15] 15;   Device (Oxygen Therapy): bubble; high-flow nasal cannula; nonrebreather mask    Body mass index is 21.45 kg/m².  Wt Readings from Last 3 Encounters:   01/08/22 77.8 kg (171 lb 9.6 oz)        Intake/Output Summary (Last 24 hours) at 1/8/2022 1250  Last data filed at 1/8/2022 0829  Gross per 24 hour   Intake 2657.68 ml   Output 3750 ml   Net -1092.32 ml     NPO Diet  ----------------------------------------------------------------------------------------------------------------------      Physical Exam:    Constitutional:  Well-developed and well-nourished.  No respiratory distress.      HENT:  Head: Normocephalic and atraumatic.  Mouth:  Moist mucous membranes.    Eyes:  Conjunctivae and EOM are normal.  No scleral icterus.  Neck:  Neck supple.  No JVD present.    Cardiovascular:  Normal rate, regular rhythm and normal heart sounds with no murmur. No edema.  Pulmonary/Chest:  No respiratory distress, no wheezes, no crackles, with normal breath sounds and good air movement.  Abdominal:  Soft.  Bowel sounds are normal.  No distension and no tenderness.   Musculoskeletal:  No edema, no tenderness, and no deformity.  No swelling or redness of joints.  Neurological: Mildly confused. No facial droop.  No slurred speech.   Skin:  Skin is warm and dry.  RLE erythema and warmth, but improving. Scattered ulcers and abrasions.   Psychiatric:   Normal mood and affect.  Behavior is normal.    ----------------------------------------------------------------------------------------------------------------------  Results from last 7 days   Lab Units 01/08/22  0038 01/06/22  0124 01/05/22  0850 01/05/22  0315 01/05/22 0315   CK TOTAL U/L 131 548* 994*   < > 1,269*   TROPONIN T ng/mL  --   --   --   --  <0.010    < > = values in this interval not displayed.         Results from last 7 days   Lab Units 01/05/22 0315   PH, ARTERIAL pH units 7.427   PO2 ART mm Hg 77.9*   PCO2, ARTERIAL mm Hg 22.1*   HCO3 ART mmol/L 14.6*     Results from last 7 days   Lab Units 01/08/22  0038 01/07/22  0035 01/06/22  0124 01/05/22  0850 01/05/22  0603 01/05/22  0231 01/04/22  2112 01/04/22  1752 01/04/22  1752   CRP mg/dL  --  44.76*  --   --  54.03*  --   --   --  55.78*   LACTATE mmol/L  --   --   --   --   --  1.7 2.2*  --  2.8*   WBC 10*3/mm3 9.07 8.46 6.28   < > 9.92  --   --    < > 10.65   HEMOGLOBIN g/dL 10.6* 10.0* 10.1*   < > 10.7*  --   --    < > 12.7*   HEMATOCRIT % 32.2* 31.4* 31.5*   < > 33.4*  --   --    < > 38.3   MCV fL 95.0 98.7* 98.7*   < > 96.8  --   --    < > 96.0   MCHC g/dL 32.9 31.8 32.1   < > 32.0  --   --    < > 33.2   PLATELETS 10*3/mm3 135* 108* 86*   < > 86*  --   --    < > 91*   INR   --   --   --   --   --   --   --   --  0.96    < > = values in this interval not displayed.     Results from last 7 days   Lab Units 01/08/22  1041 01/08/22  0038 01/07/22  1952 01/07/22  0706 01/07/22  0035 01/06/22  1733 01/06/22  0124 01/05/22  0850 01/05/22  0603 01/04/22  1752 01/04/22  1752   SODIUM mmol/L 166* 163*  --   --  153*  --  146*   < > 137   < > 136   POTASSIUM mmol/L 3.5 3.1* 3.8   < > 3.4*   < > 2.9*   < > 3.7   < > 3.3*   MAGNESIUM mg/dL  --  1.8  --   --  2.3  --  1.9   < >  --   --  1.9   CHLORIDE mmol/L 131* 129*  --   --  118*  --  107   < > 96*   < > 85*   CO2 mmol/L 22.9 20.2*  --   --  12.3*  --  12.9*   < > 12.3*   < > 18.0*   BUN mg/dL 25*  22*  --   --  29*  --  33*   < > 27*   < > 27*   CREATININE mg/dL 1.64* 1.22  --   --  1.39*  --  1.37*   < > 1.35*   < > 1.14   EGFR IF NONAFRICN AM mL/min/1.73 43* 61  --   --  52*  --  53*   < > 54*   < > 66   CALCIUM mg/dL 7.9* 7.7*  --   --  7.8*  --  7.9*   < > 8.6   < > 9.8   GLUCOSE mg/dL 148* 139*  --   --  232*  --  202*   < > 262*   < > 292*   ALBUMIN g/dL  --   --   --   --   --   --   --   --  2.40*  --  3.52   BILIRUBIN mg/dL  --   --   --   --   --   --   --   --  0.5  --  0.8   ALK PHOS U/L  --   --   --   --   --   --   --   --  198*  --  288*   AST (SGOT) U/L  --   --   --   --   --   --   --   --  85*  --  147*   ALT (SGPT) U/L  --   --   --   --   --   --   --   --  66*  --  102*    < > = values in this interval not displayed.   Estimated Creatinine Clearance: 54 mL/min (A) (by C-G formula based on SCr of 1.64 mg/dL (H)).  No results found for: AMMONIA    Glucose   Date/Time Value Ref Range Status   01/08/2022 0917 164 (H) 70 - 130 mg/dL Final     Comment:     Meter: [id number] : [id number] STAN ROQUE   01/08/2022 0528 154 (H) 70 - 130 mg/dL Final     Comment:     Meter: [id number] : [id number] Diamond Underwood   01/07/2022 2346 133 (H) 70 - 130 mg/dL Final     Comment:     Meter: [id number] : [id number] JAS ARASELI   01/07/2022 2004 174 (H) 70 - 130 mg/dL Final     Comment:     Meter: [id number] : [id number] LAURA RIVERA   01/07/2022 1746 220 (H) 70 - 130 mg/dL Final     Comment:     Meter: [id number] : [id number] CHINA ROOT   01/07/2022 1300 211 (H) 70 - 130 mg/dL Final     Comment:     Meter: [id number] : [id number] CHINA [facility]   01/07/2022 0513 233 (H) 70 - 130 mg/dL Final     Comment:     Meter: [id number] : [id number] [facility]   01/06/2022 2327 225 (H) 70 - 130 mg/dL Final     Comment:     Meter: [id number] : [id number] APRIL [facility]     Lab Results   Component Value Date    HGBA1C 7.60 (H) 01/04/2022     No results found for: TSH, FREET4    Blood Culture    Date Value Ref Range Status   01/07/2022 No growth at 24 hours  Preliminary   01/04/2022 Proteus mirabilis (C)  Final   01/04/2022 Streptococcus dysgalactiae ssp equisimilis (C)  Final   01/04/2022 Enterococcus faecalis (C)  Final     Comment:     Infectious disease consultation is highly recommended.   01/04/2022 Gram Negative Bacilli (C)  Final   01/04/2022 Streptococcus dysgalactiae ssp equisimilis (C)  Final   01/04/2022 Enterococcus faecalis (C)  Final     Comment:     Infectious disease consultation is highly recommended.   01/04/2022 Alcaligenes faecalis ssp faecalis (C)  Final   01/04/2022 Staphylococcus, coagulase negative (C)  Final     Comment:     Probable contaminant requires clinical correlation, susceptibility not performed unless requested by physician.       No results found for: URINECX  No results found for: WOUNDCX  No results found for: STOOLCX  No results found for: RESPCX  Pain Management Panel       Pain Management Panel Latest Ref Rng & Units 1/4/2022    AMPHETAMINES SCREEN, URINE Negative Negative    BARBITURATES SCREEN Negative Negative    BENZODIAZEPINE SCREEN, URINE Negative Negative    BUPRENORPHINEUR Negative Negative    COCAINE SCREEN, URINE Negative Negative    METHADONE SCREEN, URINE Negative Negative    METHAMPHETAMINEUR Negative Negative              ----------------------------------------------------------------------------------------------------------------------  Imaging Results (Last 24 Hours)       Procedure Component Value Units Date/Time    XR Chest 1 View [[id number]] Resulted: 01/08/22 1136     Updated: 01/08/22 1212            ----------------------------------------------------------------------------------------------------------------------    Assessment/Plan       Assessment/Plan     ASSESSMENT:    1. Severe sepsis with lactic acid greater than 2 on admission  2. Bacteremia  3. Cellulitis right lower extremitiy    PLAN:    Patient remains drowsy this morning.  On  increased oxygen requirement of 6 L bubble high flow this morning.  Fever of 102 this morning.  WBC normal.  CK now normalized at 131.  Creatinine improved at 1.22.  Blood cultures from 1/4/2022 finalized as 1 out of 2 sets positive for Streptococcus dysgalactiae, Enterococcus faecalis, Alcaligenes faecalis, coagulase-negative Staphylococcus and noted 2 sets positive for Proteus mirabilis, Streptococcus dysgalactiae, Enterococcus faecalis.  Repeat blood culture from 1/7/2020 today shows no growth thus far.    MRSA PCR negative. COVID-19 and influenza PCR negative. Lactic 2.2 on admission. Urinalysis unremarkable for infection.CT of the abdomen and pelvis from 1/5/22 reports diffuse fatty change of the liver. CT Chest from 1/5/22 unremarkable. CT of the facial bones from 1/4/22 reports significant periapical lucency seen on the lower left and the mandible. CT of the head from 1/4/22 reports significant periapical lucency seen on the lower left and the mandible.    2D echo from 1/7/2022 reports no evidence of vegetation.    Patient was originally deescalated to Rocephin 2 g IV every 24 hours to continue with vancomycin pharmacy to dose, based on finalization of blood cultures.      However after patient was assessed this morning, patient experienced aspiration event this afternoon and rapid response was called, patient became pulseless and CODE BLUE was called, ROSC was obtained.  Patient is now intubated.  Therefore Flagyl 500 mg IV every 8 hours was initiated to continue with vancomycin and Rocephin.  We will continue to follow closely and adjust antibiotic therapy as needed.    Code Status:   Code Status and Medical Interventions:   Ordered at: 01/05/22 0246     Level Of Support Discussed With:    Patient     Code Status (Patient has no pulse and is not breathing):    CPR (Attempt to Resuscitate)     Medical Interventions (Patient has pulse or is breathing):    Full Support       Prerna Babin  ANI  01/08/22  12:50 EST

## 2022-01-08 NOTE — CONSULTS
Nephrology Consult Note    Referring Provider: Dr. Pulido  Reason for Consultation: Hypernatremia, HEIDI.     Subjective       History of present illness:  Jose Armando Reich is a 56 y.o. male who presented to [facility] emergency department after found down in home, he was admitted with alcohol intoxication and withdrawal. Pt is known to have Etoh abuse disorder and drinks atleast 12 shots of vodka daily and has done so for the last 30 years. Due to hypernatremia, he was started on D51/4NS, today morning, had worsening metal status, respiratory distress and became pulseless and received CPR for 20 seconds, he is currently intubated on MV.  Pt does not  Have history of CKD and his baseline Cr is unknown, was admitted with Cr 1.1 that has increased to 1.9. I was not able to obtain history from patient.      History  Past Medical History:   Diagnosis Date   • Alcohol abuse    • Alcoholism (HCC)    ,   Past Surgical History:   Procedure Laterality Date   • WISDOM TOOTH EXTRACTION     , History reviewed. No pertinent family history.,   Social History     Tobacco Use   • Smoking status: Not on file   • Smokeless tobacco: Not on file   Substance Use Topics   • Alcohol use: Yes     Alcohol/week: 12.0 standard drinks     Types: 12 Shots of liquor per week   • Drug use: Yes     Types: Other     Comment: ETOH   ,   No medications prior to admission.   , Scheduled Meds:  cefTRIAXone, 2 g, Intravenous, Q24H  folic acid (FOLVITE) IVPB, 1 mg, Intravenous, Daily  insulin aspart, 0-7 Units, Subcutaneous, 4x Daily AC & at Bedtime  insulin detemir, 15 Units, Subcutaneous, Daily  multivitamin IVPB, , Intravenous, Daily  potassium chloride, 40 mEq, Oral, Once  potassium phosphate infusion greater than 15 mMoles, 45 mmol, Intravenous, Once  sodium chloride, 10 mL, Intravenous, Q12H  sodium chloride, 10 mL, Intravenous, Q12H  thiamine (VITAMIN B1) IVPB, 200 mg, Intravenous, Daily  vancomycin, 1,500 mg, Intravenous, Q24H    ,  Continuous Infusions:  dextrose, 100 mL/hr, Last Rate: 100 mL/hr (01/08/22 0321)  Pharmacy Consult - Pharmacy to dose,   Pharmacy Consult - Pharmacy to dose,     , PRN Meds:  •  acetaminophen  •  dextrose  •  dextrose  •  glucagon (human recombinant)  •  LORazepam **OR** LORazepam **OR** LORazepam **OR** LORazepam **OR** LORazepam **OR** LORazepam **OR** LORazepam **OR** LORazepam  •  magnesium sulfate **OR** magnesium sulfate **OR** magnesium sulfate  •  potassium chloride **OR** potassium chloride **OR** potassium chloride  •  potassium phosphate infusion greater than 15 mMoles **OR** potassium phosphate infusion greater than 15 mMoles **OR** potassium phosphate **OR** sodium phosphate IVPB **OR** sodium phosphate IVPB **OR** sodium phosphate IVPB  •  [COMPLETED] Insert peripheral IV **AND** sodium chloride  •  sodium chloride  •  sodium chloride and Allergies:  Patient has no known allergies.    Review of Systems  More than 10 point review of systems was done. Pertinent items are noted in HPI, all other systems reviewed and negative    Objective     Vital Signs  Temp:  [97.3 °F (36.3 °C)-102.4 °F (39.1 °C)] 102.4 °F (39.1 °C)  Heart Rate:  [] 98  Resp:  [18-28] 26  BP: (106-165)/() 112/66    No intake/output data recorded.  I/O last 3 completed shifts:  In: 4475 [I.V.:4475]  Out: 3850 [Urine:3850]    Physical Examination:  General Appearance: intubated on  MV  No JVD  Lungs: Clear to auscultation, respirations regular and unlabored  Heart: Regular rhythm & normal rate, normal S1, S2, no murmur, no gallop, no rub   Abdomen: Normal bowel sounds, no masses and soft non-tender  No edema  Pulses: Palpable and equal bilaterally  Skin: No rash  Neurologic: On sedation.     Laboratory Data :      WBC WBC   Date Value Ref Range Status   01/08/2022 9.07 3.40 - 10.80 10*3/mm3 Final   01/07/2022 8.46 3.40 - 10.80 10*3/mm3 Final   01/06/2022 6.28 3.40 - 10.80 10*3/mm3 Final      HGB Hemoglobin   Date Value Ref  Range Status   01/08/2022 10.6 (L) 13.0 - 17.7 g/dL Final   01/07/2022 10.0 (L) 13.0 - 17.7 g/dL Final   01/06/2022 10.1 (L) 13.0 - 17.7 g/dL Final      HCT Hematocrit   Date Value Ref Range Status   01/08/2022 32.2 (L) 37.5 - 51.0 % Final   01/07/2022 31.4 (L) 37.5 - 51.0 % Final   01/06/2022 31.5 (L) 37.5 - 51.0 % Final      Platlets No results found for: LABPLAT   MCV MCV   Date Value Ref Range Status   01/08/2022 95.0 79.0 - 97.0 fL Final   01/07/2022 98.7 (H) 79.0 - 97.0 fL Final   01/06/2022 98.7 (H) 79.0 - 97.0 fL Final          Sodium Sodium   Date Value Ref Range Status   01/08/2022 163 (C) 136 - 145 mmol/L Final   01/07/2022 153 (H) 136 - 145 mmol/L Final   01/06/2022 146 (H) 136 - 145 mmol/L Final      Potassium Potassium   Date Value Ref Range Status   01/08/2022 3.1 (L) 3.5 - 5.2 mmol/L Final   01/07/2022 3.8 3.5 - 5.2 mmol/L Final     Comment:     Slight hemolysis detected by analyzer. Results may be affected.   01/07/2022 3.6 3.5 - 5.2 mmol/L Final   01/07/2022 3.4 (L) 3.5 - 5.2 mmol/L Final   01/06/2022 3.2 (L) 3.5 - 5.2 mmol/L Final   01/06/2022 2.9 (L) 3.5 - 5.2 mmol/L Final      Chloride Chloride   Date Value Ref Range Status   01/08/2022 129 (H) 98 - 107 mmol/L Final   01/07/2022 118 (H) 98 - 107 mmol/L Final   01/06/2022 107 98 - 107 mmol/L Final      CO2 CO2   Date Value Ref Range Status   01/08/2022 20.2 (L) 22.0 - 29.0 mmol/L Final   01/07/2022 12.3 (L) 22.0 - 29.0 mmol/L Final   01/06/2022 12.9 (L) 22.0 - 29.0 mmol/L Final      BUN BUN   Date Value Ref Range Status   01/08/2022 22 (H) 6 - 20 mg/dL Final   01/07/2022 29 (H) 6 - 20 mg/dL Final   01/06/2022 33 (H) 6 - 20 mg/dL Final      Creatinine Creatinine   Date Value Ref Range Status   01/08/2022 1.22 0.76 - 1.27 mg/dL Final   01/07/2022 1.39 (H) 0.76 - 1.27 mg/dL Final   01/06/2022 1.37 (H) 0.76 - 1.27 mg/dL Final      Calcium Calcium   Date Value Ref Range Status   01/08/2022 7.7 (L) 8.6 - 10.5 mg/dL Final   01/07/2022 7.8 (L) 8.6 - 10.5  mg/dL Final   01/06/2022 7.9 (L) 8.6 - 10.5 mg/dL Final      PO4 No results found for: CAPO4   Albumin No results found for: ALBUMIN   Magnesium Magnesium   Date Value Ref Range Status   01/08/2022 1.8 1.6 - 2.6 mg/dL Final   01/07/2022 2.3 1.6 - 2.6 mg/dL Final   01/06/2022 1.9 1.6 - 2.6 mg/dL Final      Uric Acid No results found for: URICACID     Radiology results :     Imaging Results (Last 72 Hours)     ** No results found for the last 72 hours. **            Medications:      cefTRIAXone, 2 g, Intravenous, Q24H  folic acid (FOLVITE) IVPB, 1 mg, Intravenous, Daily  insulin aspart, 0-7 Units, Subcutaneous, 4x Daily AC & at Bedtime  insulin detemir, 15 Units, Subcutaneous, Daily  multivitamin IVPB, , Intravenous, Daily  potassium chloride, 40 mEq, Oral, Once  potassium phosphate infusion greater than 15 mMoles, 45 mmol, Intravenous, Once  sodium chloride, 10 mL, Intravenous, Q12H  sodium chloride, 10 mL, Intravenous, Q12H  thiamine (VITAMIN B1) IVPB, 200 mg, Intravenous, Daily  vancomycin, 1,500 mg, Intravenous, Q24H      dextrose, 100 mL/hr, Last Rate: 100 mL/hr (01/08/22 0321)  Pharmacy Consult - Pharmacy to dose,   Pharmacy Consult - Pharmacy to dose,         Assessment/Plan       Contusion of face    Alcohol withdrawal (HCC)    1. HEIDI, non oliguric, improving.   2. Hypernatremia  3. Metabolic acidosis  4. Metabolic encephalopathy with Etoh withdrawal  4. Sepsis with bacteremia    Calculated free  Water deficit around 5.5L, will start on D5W @ 100ml/h and DC other IVF. Pt is currently has gastric tube for  Suction, when there is no requirement  For gastric suction, will start free  Water flushes through gastic tube.   I have ordered serum osmolality and have  Set parameters for maintenance IVF  If sodium is is less than 146, DC D5W    Thanks Dr Pulido for the consult. Nephrology will follow the patient.   I discussed the patient's findings and my recommendations with patient and nursing staff    Negrito Solomon,  MD  01/08/22  11:04 EST

## 2022-01-08 NOTE — NURSING NOTE
While in patient's room with primary MD patient appeared to become cyanotic. Pt quickly deteriorated. Pt's desat to 50-60's. RRT was called. Pt then become pulseless. ACLS was started at 1110. Respiratory, ED doctor, and CCU nurse. Epi was given at 1111. Patient regained pulse at 1112. Etomidate 21mg given at 1111. Levo was started at 0.02 1123. LMA placed by ED doctor at 1129. Central line was then placed at 1149 by doctor Chani. LMA was pulled at 1158 and ET was placed at 1159. Pt was transferred to CCU at 1305. Patients father was notified at 1310.

## 2022-01-09 NOTE — PLAN OF CARE
Problem: Adult Inpatient Plan of Care  Goal: Plan of Care Review  Outcome: Ongoing, Progressing   Pt resting well; Remains int/sed; diprovan, fentanyl, levo, D5W infusing. Pt arousing to pain/turns. VSS. Afebrile. WCTM.

## 2022-01-09 NOTE — PROGRESS NOTES
PROGRESS NOTE         Patient Identification:  Name:  Jose Armando Reich  Age:  56 y.o.  Sex:  male  :  1965  MRN:  [id number]  Visit Number:  [id number]  Primary Care Provider:  Provider, No Known         LOS: 4 days       ----------------------------------------------------------------------------------------------------------------------  Subjective       Chief Complaints:    Fall        Interval History:      Patient remains intubated and sedated at decreased FiO2 45% today.  WBC normal.  Chest x-ray from 2022 reports persistent right basilar consolidation with right effusion. Blood  Culture from 2022 reports growth of beta-hemolytic group G Streptococcus.    Review of Systems:    Unable to obtain.  Intubated and sedated  ----------------------------------------------------------------------------------------------------------------------      Objective       Current Hospital Meds:  bisacodyl, 10 mg, Rectal, Daily  cefTRIAXone, 2 g, Intravenous, Q24H  chlorhexidine, 15 mL, Mouth/Throat, Q12H  enoxaparin, 40 mg, Subcutaneous, Q24H  folic acid (FOLVITE) IVPB, 1 mg, Intravenous, Daily  insulin aspart, 0-7 Units, Subcutaneous, Q6H  insulin detemir, 15 Units, Subcutaneous, Daily  metroNIDAZOLE, 500 mg, Intravenous, Q8H  multivitamin IVPB, , Intravenous, Daily  pantoprazole, 40 mg, Intravenous, Q AM  polyethylene glycol, 17 g, Oral, Daily  potassium chloride, 40 mEq, Oral, Once  sodium chloride, 10 mL, Intravenous, Q12H  sodium chloride, 10 mL, Intravenous, Q12H  thiamine (VITAMIN B1) IVPB, 200 mg, Intravenous, Daily  vancomycin, 1,500 mg, Intravenous, Q24H      dextrose, 75 mL/hr, Last Rate: 100 mL/hr (22 7892)  fentaNYL Citrate,   norepinephrine, 0.02-0.3 mcg/kg/min, Last Rate: 0.04 mcg/kg/min (22 4430)  Pharmacy Consult - Pharmacy to dose,   Pharmacy Consult - Pharmacy to dose,   propofol, 5-50 mcg/kg/min, Last Rate: 25 mcg/kg/min (22  0337)      ----------------------------------------------------------------------------------------------------------------------    Vital Signs:  Temp:  [98.6 °F (37 °C)-99.7 °F (37.6 °C)] 99.2 °F (37.3 °C)  Heart Rate:  [] 93  Resp:  [17-24] 18  BP: ()/() 108/65  FiO2 (%):  [35 %-70 %] 35 %  Mean Arterial Pressure (Non-Invasive) for the past 24 hrs (Last 3 readings):   Noninvasive MAP (mmHg)   01/09/22 0602 77   01/09/22 0430 65   01/09/22 0412 65     SpO2 Percentage    01/09/22 0430 01/09/22 0602 01/09/22 1016   SpO2: 98% 99% 98%     SpO2:  [89 %-100 %] 98 %  on   ;   Device (Oxygen Therapy): ventilator    Body mass index is 20.75 kg/m².  Wt Readings from Last 3 Encounters:   01/09/22 75.3 kg (166 lb)        Intake/Output Summary (Last 24 hours) at 1/9/2022 1131  Last data filed at 1/9/2022 0956  Gross per 24 hour   Intake 5619.93 ml   Output 700 ml   Net 4919.93 ml     NPO Diet  ----------------------------------------------------------------------------------------------------------------------      Physical Exam:    Constitutional: Intubated and sedated.    HENT:  Head: Normocephalic and atraumatic.  Mouth:  Moist mucous membranes.    Eyes:  Conjunctivae and EOM are normal.  No scleral icterus.  Neck:  Neck supple.  No JVD present.    Cardiovascular:  Normal rate, regular rhythm and normal heart sounds with no murmur. No edema.  Pulmonary/Chest:   Scattered rhonchi noted bilaterally.  Intubated and sedated.  Abdominal:  Soft.  Bowel sounds are normal.  No distension and no tenderness.   Musculoskeletal:  No edema, no tenderness, and no deformity.  No swelling or redness of joints.  Neurological:  Intubated and sedated.  Skin:  Skin is warm and dry.  RLE erythema and warmth, but improving. Scattered ulcers and abrasions.   Psychiatric:   Patient is sedated.    ----------------------------------------------------------------------------------------------------------------------  Results from  last 7 days   Lab Units 01/08/22  0038 01/06/22  0124 01/05/22  0850 01/05/22  0315 01/05/22  0315   CK TOTAL U/L 131 548* 994*   < > 1,269*   TROPONIN T ng/mL  --   --   --   --  <0.010    < > = values in this interval not displayed.         Results from last 7 days   Lab Units 01/09/22  0439   PH, ARTERIAL pH units 7.369   PO2 ART mm Hg 83.2   PCO2, ARTERIAL mm Hg 38.4   HCO3 ART mmol/L 22.1     Results from last 7 days   Lab Units 01/08/22  2355 01/08/22  0038 01/07/22  0035 01/05/22  0850 01/05/22  0603 01/05/22  0231 01/04/22 2112 01/04/22  1752 01/04/22  1752   CRP mg/dL  --   --  44.76*  --  54.03*  --   --   --  55.78*   LACTATE mmol/L  --   --   --   --   --  1.7 2.2*  --  2.8*   WBC 10*3/mm3 9.23 9.07 8.46   < > 9.92  --   --    < > 10.65   HEMOGLOBIN g/dL 8.8* 10.6* 10.0*   < > 10.7*  --   --    < > 12.7*   HEMATOCRIT % 28.0* 32.2* 31.4*   < > 33.4*  --   --    < > 38.3   MCV fL 98.9* 95.0 98.7*   < > 96.8  --   --    < > 96.0   MCHC g/dL 31.4* 32.9 31.8   < > 32.0  --   --    < > 33.2   PLATELETS 10*3/mm3 133* 135* 108*   < > 86*  --   --    < > 91*   INR   --   --   --   --   --   --   --   --  0.96    < > = values in this interval not displayed.     Results from last 7 days   Lab Units 01/09/22  0547 01/08/22  2355 01/08/22  1848 01/08/22  1041 01/08/22  0038 01/07/22  0706 01/07/22  0035 01/05/22  0850 01/05/22  0603 01/04/22  1752 01/04/22  1752   SODIUM mmol/L 156* 155* 160*   < > 163*  --  153*   < > 137   < > 136   POTASSIUM mmol/L 3.7 3.1* 3.2*   < > 3.1*   < > 3.4*   < > 3.7   < > 3.3*   MAGNESIUM mg/dL  --  2.4  --   --  1.8  --  2.3   < >  --   --  1.9   CHLORIDE mmol/L 124* 122* 128*   < > 129*  --  118*   < > 96*   < > 85*   CO2 mmol/L 20.7* 20.9* 20.1*   < > 20.2*  --  12.3*   < > 12.3*   < > 18.0*   BUN mg/dL 37* 34* 30*   < > 22*  --  29*   < > 27*   < > 27*   CREATININE mg/dL 3.01* 2.69* 2.20*   < > 1.22  --  1.39*   < > 1.35*   < > 1.14   EGFR IF NONAFRICN AM mL/min/1.73 22* 24* 31*    < > 61  --  52*   < > 54*   < > 66   CALCIUM mg/dL 7.2* 7.0* 7.2*   < > 7.7*  --  7.8*   < > 8.6   < > 9.8   GLUCOSE mg/dL 164* 153* 215*   < > 139*  --  232*   < > 262*   < > 292*   ALBUMIN g/dL  --   --   --   --   --   --   --   --  2.40*  --  3.52   BILIRUBIN mg/dL  --   --   --   --   --   --   --   --  0.5  --  0.8   ALK PHOS U/L  --   --   --   --   --   --   --   --  198*  --  288*   AST (SGOT) U/L  --   --   --   --   --   --   --   --  85*  --  147*   ALT (SGPT) U/L  --   --   --   --   --   --   --   --  66*  --  102*    < > = values in this interval not displayed.   Estimated Creatinine Clearance: 28.5 mL/min (A) (by C-G formula based on SCr of 3.01 mg/dL (H)).  No results found for: AMMONIA    Glucose   Date/Time Value Ref Range Status   01/09/2022 1059 159 (H) 70 - 130 mg/dL Final     Comment:     Meter: [id number] : [id number] kadie meyer   01/09/2022 0521 126 70 - 130 mg/dL Final     Comment:     Meter: [id number] : [id number] BRENT ANTONIO   01/09/2022 0019 156 (H) 70 - 130 mg/dL Final     Comment:     Meter: [id number] : [id number] BRENT CARLSONSTUTTER   01/08/2022 1742 197 (H) 70 - 130 mg/dL Final     Comment:     Meter: [id number] : [id number] KATHY BUTLER R   01/08/2022 1405 184 (H) 70 - 130 mg/dL Final     Comment:     Meter: [id number] : [id number] KATHY BUTLER R   01/08/2022 0917 164 (H) 70 - 130 mg/dL Final     Comment:     Meter: [id number] : [id number] STAN ROQUE   01/08/2022 0528 154 (H) 70 - 130 mg/dL Final     Comment:     Meter: [id number] : [id number] Diamond Underwood   01/07/2022 2346 133 (H) 70 - 130 mg/dL Final     Comment:     Meter: [id number] : [id number] JAS CARRERAKLEY     Lab Results   Component Value Date    HGBA1C 7.60 (H) 01/04/2022     No results found for: TSH, FREET4    Blood Culture   Date Value Ref Range Status   01/07/2022 No growth at 24 hours  Preliminary   01/04/2022 Proteus mirabilis (C)  Final   01/04/2022 Streptococcus dysgalactiae  ssp equisimilis (C)  Final   01/04/2022 Enterococcus faecalis (C)  Final     Comment:     Infectious disease consultation is highly recommended.   01/04/2022 Gram Negative Bacilli (C)  Final   01/04/2022 Streptococcus dysgalactiae ssp equisimilis (C)  Final   01/04/2022 Enterococcus faecalis (C)  Final     Comment:     Infectious disease consultation is highly recommended.   01/04/2022 Alcaligenes faecalis ssp faecalis (C)  Final   01/04/2022 Staphylococcus, coagulase negative (C)  Final     Comment:     Probable contaminant requires clinical correlation, susceptibility not performed unless requested by physician.       No results found for: URINECX  No results found for: WOUNDCX  No results found for: STOOLCX  No results found for: RESPCX  Pain Management Panel       Pain Management Panel Latest Ref Rng & Units 1/4/2022    AMPHETAMINES SCREEN, URINE Negative Negative    BARBITURATES SCREEN Negative Negative    BENZODIAZEPINE SCREEN, URINE Negative Negative    BUPRENORPHINEUR Negative Negative    COCAINE SCREEN, URINE Negative Negative    METHADONE SCREEN, URINE Negative Negative    METHAMPHETAMINEUR Negative Negative              ----------------------------------------------------------------------------------------------------------------------  Imaging Results (Last 24 Hours)       Procedure Component Value Units Date/Time    XR Chest 1 View [[id number]] Collected: 01/09/22 0229     Updated: 01/09/22 0231    Narrative:      CR Chest 1 Vw    INDICATION:   Endotracheal tube placement.     COMPARISON:    1/8/2022    FINDINGS:  Single portable AP view(s) of the chest.    Endotracheal tube is in the upper thoracic trachea about 8 cm above the antoinette. Consider advancing 2 to 3 cm. Left IJ line tip is in the SVC. NG tube tip is in the proximal stomach. Heart size is stable. There is continued consolidation at the right lung  base with a right pleural effusion. Left lung appears clear. No pneumothorax.       Impression:       ET tube tip about 8 cm above the antoinette. Consider advancing 2 to 3 cm. There is persistent right basilar consolidation with a right effusion.    Signer Name: Michael Brower MD   Signed: 1/9/2022 2:29 AM   Workstation Name: [email or web address]    [facility]    XR Abdomen KUB [[id number]] Collected: 01/08/22 1612     Updated: 01/08/22 1614    Narrative:      CR Abdomen 1 Vw    INDICATION:   Abdominal distention    COMPARISON:   None available    FINDINGS:  AP radiograph(s) of the abdomen. The renal shadows are symmetric. No renal calculi. No bladder calculi.    Gas is seen in both small and large bowel. The sigmoid colon is mildly distended. Stool burden is moderately increased throughout the colon suggesting possible constipation. No acute osseous abnormalities. Nasogastric tube appears in satisfactory  position.      Impression:      Generally increased stool burden. No evidence of mechanical bowel obstruction.    Signer Name: Michael Naik MD   Signed: 1/8/2022 4:12 PM   Workstation Name: [id number]    [facility]    XR Chest 1 View [[id number]] Collected: 01/08/22 1312     Updated: 01/08/22 1314    Narrative:      CHEST X-RAY, 1/8/2022      HISTORY:    56-year-old male hospital inpatient with respiratory failure. Endotracheal tube and central line placement.      TECHNIQUE:  AP portable supine radiographs of the chest.      FINDINGS:  Newly placed endotracheal tube is in good position with tip in the mid thoracic trachea about 6 cm above the antoinette.    Left IJ central line in good position with tip in the mid SVC. No visible pneumothorax.    Newly placed NG tube with tip in the mid stomach. No gastric distention.    Diffusely increased interstitial markings throughout both lungs exaggerated by shallow lung expansion. Infiltrate or atelectasis in the posterior right lung base. Heart size normal.      Impression:      1.  Newly placed ETT, central line and NGT in good  position. No pneumothorax.  2.  Developing infiltrate and/or atelectasis in the posterior right lung base.    Signer Name: Edgardo Roberto MD   Signed: 1/8/2022 1:12 PM   Workstation Name: [id number]    [facility]            ----------------------------------------------------------------------------------------------------------------------    Assessment/Plan       Assessment/Plan     ASSESSMENT:    1. Severe sepsis with lactic acid greater than 2 on admission  2. Bacteremia  3. Cellulitis right lower extremitiy    PLAN:    Patient remains intubated and sedated at decreased FiO2 45% today.  WBC normal.  Chest x-ray from 1/9/2022 reports persistent right basilar consolidation with right effusion. Blood  Culture from 1/7/2022 reports growth of beta-hemolytic group G Streptococcus.    Blood cultures from 1/4/2022 finalized as 1 out of 2 sets positive for Streptococcus dysgalactiae, Enterococcus faecalis, Alcaligenes faecalis, coagulase-negative Staphylococcus and noted 2 sets positive for Proteus mirabilis, Streptococcus dysgalactiae, Enterococcus faecalis.     MRSA PCR negative. COVID-19 and influenza PCR negative. Lactic 2.2 on admission. Urinalysis unremarkable for infection.CT of the abdomen and pelvis from 1/5/22 reports diffuse fatty change of the liver. CT Chest from 1/5/22 unremarkable. CT of the facial bones from 1/4/22 reports significant periapical lucency seen on the lower left and the mandible. CT of the head from 1/4/22 reports significant periapical lucency seen on the lower left and the mandible.    2D echo from 1/7/2022 reports no evidence of vegetation.    On 1/8/22 patient experienced aspiration event this afternoon and rapid response was called, patient became pulseless and CODE BLUE was called, ROSC was obtained.  Patient was intubated.      Recommend to continue Rocephin, vancomycin, Flagyl for now.  We will continue to follow closely and adjust antibiotic therapy  as needed.    Code Status:   Code Status and Medical Interventions:   Ordered at: 01/05/22 0246     Level Of Support Discussed With:    Patient     Code Status (Patient has no pulse and is not breathing):    CPR (Attempt to Resuscitate)     Medical Interventions (Patient has pulse or is breathing):    Full Support       ANI New  01/09/22  11:31 EST

## 2022-01-09 NOTE — PROGRESS NOTES
Patient continues on day 5 vancomycin. Scr increased to 3.62 mg/dL today. Vancomycin trough level came back as 30.7 mg/L. Will change to intermittent dosing. No dose is needed today. Will check a random level tomorrow morning to assess for further dosing.    Thank you,    Sharon Herr, PharmD

## 2022-01-09 NOTE — PROGRESS NOTES
Nephrology Progress Note      Subjective     Patient remains intubated on  MV    Objective       Vital signs :     Temp:  [98.6 °F (37 °C)-99.7 °F (37.6 °C)] 99.2 °F (37.3 °C)  Heart Rate:  [] 90  Resp:  [17-26] 18  BP: ()/() 108/65  FiO2 (%):  [60 %-100 %] 60 %      Intake/Output Summary (Last 24 hours) at 1/9/2022 1008  Last data filed at 1/9/2022 0956  Gross per 24 hour   Intake 5619.93 ml   Output 700 ml   Net 4919.93 ml       Physical Exam:    General Appearance : intubated sedated  Lungs : clear to auscultation, respirations regular  Heart :  regular rhythm & normal rate, normal S1, S2 and no murmur, no rub  Abdomen : normal bowel sounds, no masses, no hepatomegaly, no splenomegaly, soft non-tender and no guarding  Extremities : no edema, no cyanosis and no redness  Neurologic :   Sedated,  Unable to  Assess.          Laboratory Data :     Albumin No results found for: ALBUMIN   Magnesium Magnesium   Date Value Ref Range Status   01/08/2022 2.4 1.6 - 2.6 mg/dL Final   01/08/2022 1.8 1.6 - 2.6 mg/dL Final   01/07/2022 2.3 1.6 - 2.6 mg/dL Final          PTH               No results found for: PTH    CBC and coagulation:  Results from last 7 days   Lab Units 01/08/22  2355 01/08/22  0038 01/07/22  0035 01/05/22  0850 01/05/22  0603 01/05/22  0231 01/04/22  2112 01/04/22  1752 01/04/22  1752   LACTATE mmol/L  --   --   --   --   --  1.7 2.2*  --  2.8*   CRP mg/dL  --   --  44.76*  --  54.03*  --   --   --  55.78*   WBC 10*3/mm3 9.23 9.07 8.46   < > 9.92  --   --    < > 10.65   HEMOGLOBIN g/dL 8.8* 10.6* 10.0*   < > 10.7*  --   --    < > 12.7*   HEMATOCRIT % 28.0* 32.2* 31.4*   < > 33.4*  --   --    < > 38.3   MCV fL 98.9* 95.0 98.7*   < > 96.8  --   --    < > 96.0   MCHC g/dL 31.4* 32.9 31.8   < > 32.0  --   --    < > 33.2   PLATELETS 10*3/mm3 133* 135* 108*   < > 86*  --   --    < > 91*   INR   --   --   --   --   --   --   --   --  0.96    < > = values in this interval not displayed.      Acid/base balance:  Results from last 7 days   Lab Units 01/09/22  0439 01/08/22  1347 01/05/22  0315   PH, ARTERIAL pH units 7.369 7.487* 7.427   PO2 ART mm Hg 83.2 56.6* 77.9*   PCO2, ARTERIAL mm Hg 38.4 29.9* 22.1*   HCO3 ART mmol/L 22.1 22.6 14.6*     Renal and electrolytes:  Results from last 7 days   Lab Units 01/09/22  0547 01/08/22  2355 01/08/22  1848 01/08/22  1217 01/08/22  1217 01/08/22  1041 01/08/22  1041 01/08/22  0038 01/08/22  0038 01/07/22  0706 01/07/22  0035   SODIUM mmol/L 156* 155* 160*  --  164*  --  166*   < > 163*  --  153*   POTASSIUM mmol/L 3.7 3.1* 3.2*   < > 3.5   < > 3.5   < > 3.1*   < > 3.4*   MAGNESIUM mg/dL  --  2.4  --   --   --   --   --   --  1.8  --  2.3   CHLORIDE mmol/L 124* 122* 128*   < > 130*   < > 131*   < > 129*  --  118*   CO2 mmol/L 20.7* 20.9* 20.1*   < > 21.4*   < > 22.9   < > 20.2*  --  12.3*   BUN mg/dL 37* 34* 30*   < > 27*   < > 25*   < > 22*  --  29*   CREATININE mg/dL 3.01* 2.69* 2.20*  --  1.92*  --  1.64*   < > 1.22  --  1.39*   EGFR IF NONAFRICN AM mL/min/1.73 22* 24* 31*   < > 36*   < > 43*   < > 61  --  52*   CALCIUM mg/dL 7.2* 7.0* 7.2*   < > 7.7*   < > 7.9*   < > 7.7*  --  7.8*   PHOSPHORUS mg/dL  --  4.2 4.7*  --   --   --   --   --  0.7*  --  1.1*    < > = values in this interval not displayed.     Estimated Creatinine Clearance: 28.5 mL/min (A) (by C-G formula based on SCr of 3.01 mg/dL (H)).    Liver and pancreatic function:  Results from last 7 days   Lab Units 01/05/22  0603 01/04/22  1752   ALBUMIN g/dL 2.40* 3.52   BILIRUBIN mg/dL 0.5 0.8   ALK PHOS U/L 198* 288*   AST (SGOT) U/L 85* 147*   ALT (SGPT) U/L 66* 102*   LIPASE U/L  --  30         Cardiac:      Liver and pancreatic function:  Results from last 7 days   Lab Units 01/05/22  0603 01/04/22  1752   ALBUMIN g/dL 2.40* 3.52   BILIRUBIN mg/dL 0.5 0.8   ALK PHOS U/L 198* 288*   AST (SGOT) U/L 85* 147*   ALT (SGPT) U/L 66* 102*   LIPASE U/L  --  30       Medications :     bisacodyl, 10 mg,  Rectal, Daily  cefTRIAXone, 2 g, Intravenous, Q24H  chlorhexidine, 15 mL, Mouth/Throat, Q12H  enoxaparin, 40 mg, Subcutaneous, Q24H  folic acid (FOLVITE) IVPB, 1 mg, Intravenous, Daily  insulin aspart, 0-7 Units, Subcutaneous, Q6H  insulin detemir, 15 Units, Subcutaneous, Daily  metroNIDAZOLE, 500 mg, Intravenous, Q8H  multivitamin IVPB, , Intravenous, Daily  pantoprazole, 40 mg, Intravenous, Q AM  polyethylene glycol, 17 g, Oral, Daily  potassium chloride, 40 mEq, Oral, Once  sodium chloride, 10 mL, Intravenous, Q12H  sodium chloride, 10 mL, Intravenous, Q12H  thiamine (VITAMIN B1) IVPB, 200 mg, Intravenous, Daily  vancomycin, 1,500 mg, Intravenous, Q24H      dextrose, 100 mL/hr, Last Rate: 100 mL/hr (01/09/22 6710)  fentaNYL Citrate,   norepinephrine, 0.02-0.3 mcg/kg/min, Last Rate: 0.04 mcg/kg/min (01/09/22 7844)  Pharmacy Consult - Pharmacy to dose,   Pharmacy Consult - Pharmacy to dose,   propofol, 5-50 mcg/kg/min, Last Rate: 25 mcg/kg/min (01/09/22 1397)          Assessment/Plan     1. HEIDI, ATN, baseline Cr around 1.2  2. Hypernatremia  3. Metabolic acidosis  4. Metabolic encephalopathy with Etoh withdrawal  4. Sepsis with bacteremia     Renal functions continue to get worse, 3.0 today, sodium has improved to 156 that is appropriate  Continue watching for renal recovery for now and get renal USG, UA and microscopy tomorrow and reduce D5W to 75ml/h    Calculated free  Water deficit around 5.5L,      Negrito Solomon MD  01/09/22  10:08 EST

## 2022-01-09 NOTE — PLAN OF CARE
Goal Outcome Evaluation:      Patient intubated following a Code Blue today and transferred to CCU level of care.  Patient is sedated on fentanyl and diprivan.  Levophed for BP support.  D5 maintenance fluids, electrolyte replacement and IV antibiotics.  Patient is hooked up to low wall intermittent suction for distended abdomen.  Patient's father came after pt transfer and was updated on status per RN and Dr. Pulido.

## 2022-01-09 NOTE — PROGRESS NOTES
[facility] HOSPITALIST PROGRESS NOTE     Patient Identification:  Name:  Jose Armando Reich  Age:  56 y.o.  Sex:  male  :  1965  MRN:  [id number]  Visit Number:  [id number]  Primary Care Provider:  Provider, No Known    Length of stay:  4    Subjective: Patient seen and examined assisted by Emmy Seals RN.  Patient still on pressor drip low-dose, O2 saturation is 99 to 100% on 60% FiO2, tolerated and maintain O2 saturations 97% on 45% FiO2.  We will continue to titrate, the temporary abdominal distention improved, due to air from bagging during establishment of airways.      Chief Complaint: Altered mental status respiratory failure  ----------------------------------------------------------------------------------------------------------------------  Current [facility] Meds:  bisacodyl, 10 mg, Rectal, Daily  cefTRIAXone, 2 g, Intravenous, Q24H  chlorhexidine, 15 mL, Mouth/Throat, Q12H  enoxaparin, 40 mg, Subcutaneous, Q24H  folic acid (FOLVITE) IVPB, 1 mg, Intravenous, Daily  insulin aspart, 0-7 Units, Subcutaneous, Q6H  insulin detemir, 15 Units, Subcutaneous, Daily  metroNIDAZOLE, 500 mg, Intravenous, Q8H  multivitamin IVPB, , Intravenous, Daily  pantoprazole, 40 mg, Intravenous, Q AM  polyethylene glycol, 17 g, Oral, Daily  potassium chloride, 40 mEq, Oral, Once  sodium chloride, 10 mL, Intravenous, Q12H  sodium chloride, 10 mL, Intravenous, Q12H  thiamine (VITAMIN B1) IVPB, 200 mg, Intravenous, Daily  Vancomycin Pharmacy Intermittent Dosing, , Does not apply, Daily      dextrose, 75 mL/hr, Last Rate: 75 mL/hr (22 1100)  fentaNYL Citrate,   norepinephrine, 0.02-0.3 mcg/kg/min, Last Rate: 0.06 mcg/kg/min (22 1226)  Pharmacy Consult - Pharmacy to dose,   Pharmacy Consult - Pharmacy to dose,   propofol, 5-50 mcg/kg/min, Last Rate: 25 mcg/kg/min (22  0337)      ----------------------------------------------------------------------------------------------------------------------  Vital Signs:  Temp:  [98.6 °F (37 °C)-100.1 °F (37.8 °C)] 100.1 °F (37.8 °C)  Heart Rate:  [77-93] 93  Resp:  [17-19] 18  BP: ()/(52-69) 108/65  FiO2 (%):  [35 %-70 %] 35 %       Tele: Sinus rhythm 99 bpm 98% O2 saturation on 45% FiO2      01/08/22  0502 01/09/22  0523 01/09/22  0552   Weight: 77.8 kg (171 lb 9.6 oz) 74.9 kg (165 lb 3.2 oz) 75.3 kg (166 lb)     Body mass index is 20.75 kg/m².    Intake/Output Summary (Last 24 hours) at 1/9/2022 1433  Last data filed at 1/9/2022 0956  Gross per 24 hour   Intake 4554.93 ml   Output 700 ml   Net 3854.93 ml     NPO Diet  ----------------------------------------------------------------------------------------------------------------------  Physical exam:  General: Sedated, chronically ill-appearing, no dyssynchrony with ventilator, patient is not overbreathing the vent     Skin:  Skin is warm and dry. No rash noted. No pallor.    Multiple small ulcers from trauma, cellulitis right leg.  HENT:   ET tube OG tube in place  Eyes:  Conjunctivae and EOM are normal.  Pupils are equal, round, and reactive to light.  No scleral icterus.    Neck:  Neck supple.  No JVD present.    Left IJ lumen catheter  Pulmonary/Chest:  No respiratory distress, no wheezes, no crackles, with normal breath sounds and good air movement.  Cardiovascular:  Normal rate, regular rhythm and normal heart sounds with no murmur.  Abdominal:  Soft.  Bowel sounds are normal.  No distension and no tenderness.   Extremities: Cellulitic changes right lower extremity improved, multiple contusions abrasions both upper and lower extremity with difference stages, trace edema both lower extremity   neurological:  Motor strength equal no obvious deficit, sensory grossly intact.   No cranial nerve deficit.  No tongue deviation.  No facial droop.  No slurred speech.    Genitourinary:  Ya catheter with fiorella urine  Back:  ----------------------------------------------------------------------------------------------------------------------  ----------------------------------------------------------------------------------------------------------------------  Results from last 7 days   Lab Units 01/08/22  0038 01/06/22  0124 01/05/22  0850 01/05/22  0315 01/05/22 0315   CK TOTAL U/L 131 548* 994*   < > 1,269*   TROPONIN T ng/mL  --   --   --   --  <0.010    < > = values in this interval not displayed.     Results from last 7 days   Lab Units 01/08/22  2355 01/08/22  0038 01/07/22  0035 01/05/22  0850 01/05/22  0603 01/05/22  0231 01/04/22 2112 01/04/22  1752 01/04/22 1752   CRP mg/dL  --   --  44.76*  --  54.03*  --   --   --  55.78*   LACTATE mmol/L  --   --   --   --   --  1.7 2.2*  --  2.8*   WBC 10*3/mm3 9.23 9.07 8.46   < > 9.92  --   --    < > 10.65   HEMOGLOBIN g/dL 8.8* 10.6* 10.0*   < > 10.7*  --   --    < > 12.7*   HEMATOCRIT % 28.0* 32.2* 31.4*   < > 33.4*  --   --    < > 38.3   MCV fL 98.9* 95.0 98.7*   < > 96.8  --   --    < > 96.0   MCHC g/dL 31.4* 32.9 31.8   < > 32.0  --   --    < > 33.2   PLATELETS 10*3/mm3 133* 135* 108*   < > 86*  --   --    < > 91*   INR   --   --   --   --   --   --   --   --  0.96    < > = values in this interval not displayed.     Results from last 7 days   Lab Units 01/09/22  0439   PH, ARTERIAL pH units 7.369   PO2 ART mm Hg 83.2   PCO2, ARTERIAL mm Hg 38.4   HCO3 ART mmol/L 22.1     Results from last 7 days   Lab Units 01/09/22  1219 01/09/22  0547 01/08/22  2355 01/08/22  1041 01/08/22  0038 01/07/22  0706 01/07/22  0035 01/05/22  0850 01/05/22  0603 01/04/22  1752 01/04/22  1752   SODIUM mmol/L 155* 156* 155*   < > 163*  --  153*   < > 137   < > 136   POTASSIUM mmol/L 4.4 3.7 3.1*   < > 3.1*   < > 3.4*   < > 3.7   < > 3.3*   MAGNESIUM mg/dL  --   --  2.4  --  1.8  --  2.3   < >  --   --  1.9   CHLORIDE mmol/L 125* 124* 122*   < > 129*  --  118*   <  > 96*   < > 85*   CO2 mmol/L 18.9* 20.7* 20.9*   < > 20.2*  --  12.3*   < > 12.3*   < > 18.0*   BUN mg/dL 40* 37* 34*   < > 22*  --  29*   < > 27*   < > 27*   CREATININE mg/dL 3.62* 3.01* 2.69*   < > 1.22  --  1.39*   < > 1.35*   < > 1.14   EGFR IF NONAFRICN AM mL/min/1.73 17* 22* 24*   < > 61  --  52*   < > 54*   < > 66   CALCIUM mg/dL 7.0* 7.2* 7.0*   < > 7.7*  --  7.8*   < > 8.6   < > 9.8   GLUCOSE mg/dL 149* 164* 153*   < > 139*  --  232*   < > 262*   < > 292*   ALBUMIN g/dL  --   --   --   --   --   --   --   --  2.40*  --  3.52   BILIRUBIN mg/dL  --   --   --   --   --   --   --   --  0.5  --  0.8   ALK PHOS U/L  --   --   --   --   --   --   --   --  198*  --  288*   AST (SGOT) U/L  --   --   --   --   --   --   --   --  85*  --  147*   ALT (SGPT) U/L  --   --   --   --   --   --   --   --  66*  --  102*    < > = values in this interval not displayed.   Estimated Creatinine Clearance: 23.7 mL/min (A) (by C-G formula based on SCr of 3.62 mg/dL (H)).    No results found for: AMMONIA      Blood Culture   Date Value Ref Range Status   01/07/2022 Streptococcus, Beta Hemolytic, Group G (C)  Preliminary   01/04/2022 Proteus mirabilis (C)  Final   01/04/2022 Streptococcus dysgalactiae ssp equisimilis (C)  Final   01/04/2022 Enterococcus faecalis (C)  Final     Comment:     Infectious disease consultation is highly recommended.   01/04/2022 Gram Negative Bacilli (C)  Final   01/04/2022 Streptococcus dysgalactiae ssp equisimilis (C)  Final   01/04/2022 Enterococcus faecalis (C)  Final     Comment:     Infectious disease consultation is highly recommended.   01/04/2022 Alcaligenes faecalis ssp faecalis (C)  Final   01/04/2022 Staphylococcus, coagulase negative (C)  Final     Comment:     Probable contaminant requires clinical correlation, susceptibility not performed unless requested by physician.       No results found for: URINECX  No results found for: WOUNDCX  No results found for: STOOLCX    I have personally  looked at the labs and they are summarized above.  ----------------------------------------------------------------------------------------------------------------------  Imaging Results (Last 24 Hours)     Procedure Component Value Units Date/Time    XR Chest 1 View [[id number]] Collected: 01/09/22 0229     Updated: 01/09/22 0231    Narrative:      CR Chest 1 Vw    INDICATION:   Endotracheal tube placement.     COMPARISON:    1/8/2022    FINDINGS:  Single portable AP view(s) of the chest.    Endotracheal tube is in the upper thoracic trachea about 8 cm above the antoinette. Consider advancing 2 to 3 cm. Left IJ line tip is in the SVC. NG tube tip is in the proximal stomach. Heart size is stable. There is continued consolidation at the right lung  base with a right pleural effusion. Left lung appears clear. No pneumothorax.       Impression:      ET tube tip about 8 cm above the antoinette. Consider advancing 2 to 3 cm. There is persistent right basilar consolidation with a right effusion.    Signer Name: Michael Brower MD   Signed: 1/9/2022 2:29 AM   Workstation Name: [facility]    [facility]    XR Abdomen KUB [[id number]] Collected: 01/08/22 1612     Updated: 01/08/22 1614    Narrative:      CR Abdomen 1 Vw    INDICATION:   Abdominal distention    COMPARISON:   None available    FINDINGS:  AP radiograph(s) of the abdomen. The renal shadows are symmetric. No renal calculi. No bladder calculi.    Gas is seen in both small and large bowel. The sigmoid colon is mildly distended. Stool burden is moderately increased throughout the colon suggesting possible constipation. No acute osseous abnormalities. Nasogastric tube appears in satisfactory  position.      Impression:      Generally increased stool burden. No evidence of mechanical bowel obstruction.    Signer Name: Michael Naik MD   Signed: 1/8/2022 4:12 PM   [facility]    [facility]                 ----------------------------------------------------------------------------------------------------------------------  Assessment and Plan:  -Status post CODE BLUE PEA approxi-1 minute of compressions and 1 amp of epi ROSC achieved, day 1 on ventilator  -Acute kidney injury likely from ATN/hypotension  -Traumatic rhabdomyolysis improved  -Alcohol withdrawal with delirium tremens  -Aspiration  -Acute hypoxic respiratory failure from aspiration with pneumonia  -Sepsis present on admission with polymicrobial bacteremia  -Cellulitis of right lower extremity with multiple abrasions contusions and ulcers from trauma  -Severe constipation  -Acute hypernatremia  -New diagnosis diabetes with hyperglycemia  -Mild thrombocytopenia likely secondary to alcohol use    Hypernatremia improved, renal function worsened, nephrology is comanaging.  Patient will be started with feeding, start free water on NG tube, monitor renal function, address constipation pain taper pressor support as tolerated, antibiotic coverage per infectious disease.  Taper FiO2 as tolerated.     Supportive care, DVT prophylaxis.    Plan discussed with patient's father and agreed.  Condition is guarded at this time.    Discussed plan of care with Emmy Seals RN.    Total critical time spent on encounter is 32 minutes    Disposition pending improvement      Hannah Pulido MD  01/09/22  14:33 EST

## 2022-01-10 NOTE — PLAN OF CARE
Problem: Adult Inpatient Plan of Care  Goal: Plan of Care Review  Outcome: Ongoing, Progressing   Pt remains intubated and sedated. D5W, levo, propofol, fentanyl infusing. Pt arousing to pain/turns. Started on free water flushes through OG. Bg monitored. VSS. Afebrile. WCTM

## 2022-01-10 NOTE — CONSULTS
[facility]    Reason for consult: acute hypoxemic respiratory failure    HPI:  Mr. Reich is a 57yo wm admitted 1/4 after being found down after falling while in toxicated the day before.  He was treated with benzos in ER but withdrawal progressed to DT's. Additionally he had multiple wounds on the extremities and positive blood cultures so was started on antibiotics.  He developed worsening renal function.  Patient's mental status deteriorated and had CODE blue on hospital day 3 (1/8).  He has remained on vent post intubation.  History is obtained from the chart.    When I came in the room, patient had audible breath sounds around ET tube, not getting exhaled volumes. I added air to cuff and checked with RN, et tube supposed to be at 25 but was not,  I deflated cuff, advanced et tube and reinflated and resecured et tube.  Will check stat cxr.     Past Medical History:   Diagnosis Date   • Alcohol abuse    • Alcoholism (HCC)      Social History     Socioeconomic History   • Marital status: Single   Substance and Sexual Activity   • Alcohol use: Yes     Alcohol/week: 12.0 standard drinks     Types: 12 Shots of liquor per week   • Drug use: Yes     Types: Other     Comment: ETOH   • Sexual activity: Defer     History reviewed. No pertinent family history.  MEDS: reviewed as per chart  ALL: NKDA  ROS: UTO    Vital Sign Min/Max for last 24 hours  Temp  Min: 98.7 °F (37.1 °C)  Max: 99.7 °F (37.6 °C)   BP  Min: 68/37  Max: 138/76   Pulse  Min: 79  Max: 107   Resp  Min: 18  Max: 18   SpO2  Min: 76 %  Max: 100 %   No data recorded   Weight  Min: 73.8 kg (162 lb 12.8 oz)  Max: 73.8 kg (162 lb 12.8 oz)   3855/300  GEN:   appears ill, sedated on vent  HEENT: PERRL, normal sclera mmm, no jvd, trachea midline, neck supple  CHEST: decreased ae, coarse bilat, no wheezes, no crackles, no use of accessory muscles  CV: RRR, no m/g/r  ABD: soft, nt, nd +bs, no hepatosplenomegaly  EXT: no c/c/ +edema le with cellulitic  "changes on the right   SKIN: no rashes, no xanthomas, nl turgor, warm, dry  LYMPH: no palpable cervical or supraclavicular lymphadenopathy  NEURO: CN 2-12 grossly intact/symmetric, limited eval given sedation and intubation  PSYCH: deferred  MSK: No kyphoscoliosis, normal muscular development    Labs: 1/10: reviewed:  Glucose 109  Bun 50  Cr 4.9  Na 154  Bicarb 17  7.30/37/86 (35%; 18; 5; 510)  Wbc 10.7  hgb 8.6 (mcv 102)  plts 148    CXR: 1/9: elevated right diaphragm and infiltrate  Micro: reviewed:    A/P:  1. Acute hypoxemic respiratory failure -- oxygenation not bad, suspect mental status will be biggest barrier to extubation. Will start daily sbt and weaning trials.   2. Alcohol withdrawal, abuse and dependence with francisco oconnor -- probably can stop the ciwa protocol at this point and transition on to some scheduled ativan per tube and gradually wean this off.   3. Aspiration pneumonia -- antibitoics per ID  4. S/p code blue arrest -- sounds like was primarily a respiratory arrest due to inability to protect airway  5. HEIDI -- nephrology following  6. Hypernatremia \"  \"  7. Megaloblastic anemia  8. Bacteremia    CC 35 mins.     Discussed with RN and with father at bedside  He is thinking patient had poor quality of life, would not want dialysis.  I recommeded if patient get to the point where he absoluetly needs dialysis and he doesn't want dialysis then we should go full comfort measures                "

## 2022-01-10 NOTE — PROGRESS NOTES
Called stat to patient bedside for oxygen desaturation  ET tube again appeared to high  Visualized ET Tube with glidescope, balloon stradling the vocal cords, deflated balooon and advanced et tube, reinflated et tube. Verified positioning with bronchoscope.

## 2022-01-10 NOTE — CASE MANAGEMENT/SOCIAL WORK
Discharge Planning Assessment   Adrien     Patient Name: Jose Armando Reich  MRN: [id number]  Today's Date: 1/10/2022    Admit Date: 1/4/2022     Discharge Plan     Row Name 01/10/22 1058       Plan    Plan Pt admitted 1/4/22. Pt intubated 1/8 post code. Pt was discussed with rounding team on this date, plan for weaning trial today. Pt is from home and does not utilize home health services or DME. May need psych consult, follow up regarding alcohol detox when stable. SS to continue to follow and assist.              WILLIAM Tyson

## 2022-01-10 NOTE — PROGRESS NOTES
[facility]     Patient Identification:  Name:  Jose Armando Reich  Age:  56 y.o.  Sex:  male  :  1965  MRN:  [id number]  Visit Number:  [id number]  Primary Care Provider:  Provider, No Known    Length of stay:  5    Chief complaint: Intubated and sedated    Subjective:    Patient seen and examined at bedside without nursing staff present.  Patient appears to be on minimal ventilatory support.  Per nursing staff, no new events overnight.  ----------------------------------------------------------------------------------------------------------------------  Current Hospital Meds:  bisacodyl, 10 mg, Rectal, Daily  cefTRIAXone, 2 g, Intravenous, Q24H  chlorhexidine, 15 mL, Mouth/Throat, Q12H  enoxaparin, 40 mg, Subcutaneous, Q24H  folic acid, 1 mg, Oral, Daily  insulin aspart, 0-7 Units, Subcutaneous, Q6H  insulin detemir, 15 Units, Subcutaneous, Daily  metroNIDAZOLE, 500 mg, Intravenous, Q8H  multivitamin, 1 tablet, Per G Tube, Daily  pantoprazole, 40 mg, Intravenous, Q AM  polyethylene glycol, 17 g, Oral, Daily  sodium chloride, 10 mL, Intravenous, Q12H  sodium chloride, 10 mL, Intravenous, Q12H  thiamine, 100 mg, Oral, Daily  Vancomycin Pharmacy Intermittent Dosing, , Does not apply, Daily      fentaNYL Citrate,   norepinephrine, 0.02-0.3 mcg/kg/min, Last Rate: 0.04 mcg/kg/min (01/10/22 0154)  Pharmacy Consult - Pharmacy to dose,   propofol, 5-50 mcg/kg/min, Last Rate: 20 mcg/kg/min (01/10/22 0168)      ----------------------------------------------------------------------------------------------------------------------  Vital Signs:  Temp:  [97.6 °F (36.4 °C)-99.7 °F (37.6 °C)] 97.6 °F (36.4 °C)  Heart Rate:  [] 79  Resp:  [18] 18  BP: ()/(55-82) 107/67  FiO2 (%):  [35 %-100 %] 35 %      22  0523 22  0552 01/10/22  0500   Weight: 74.9 kg (165 lb 3.2 oz) 75.3 kg (166 lb) 73.8 kg (162 lb 12.8 oz)     Body mass index is 20.35  kg/m².    Intake/Output Summary (Last 24 hours) at 1/10/2022 1853  Last data filed at 1/10/2022 1743  Gross per 24 hour   Intake 2415.43 ml   Output 300 ml   Net 2115.43 ml     NPO Diet  ----------------------------------------------------------------------------------------------------------------------  Physical exam:  Constitutional: Acutely ill-appearing  male, currently intubated and sedated.     HENT:  Head:  Normocephalic and atraumatic.  Mouth:  Moist mucous membranes.    Eyes:  Conjunctivae are normal.   No scleral icterus.    Neck:  Neck supple. No thyromegaly.  No JVD present.    Cardiovascular:  Regular rate and rhythm with no murmurs, rubs, clicks or gallops appreciated.  Pulmonary/Chest:  Clear to auscultation bilaterally with no crackles, wheezes or rhonchi appreciated.  Abdominal:  Soft. Nontender. Nondistended  Bowel sounds are normal in all four quadrants. No organomegally appreciated.   Musculoskeletal:  No edema, no tenderness, and no deformity.  No red or swollen joints anywhere.    Neurological: Intubated and sedated.   Skin:  Warm and dry to palpation with no rashes or lesions appreciated.  Peripheral vascular:  2+ radial and pedal pulses in bilateral upper and lower extremities.    ------------------------------------------------------------------------------    ----------------------------------------------------------------------------------------------------------------------  Results from last 7 days   Lab Units 01/08/22  0038 01/06/22  0124 01/05/22  0850 01/05/22  0315 01/05/22  0315   CK TOTAL U/L 131 548* 994*   < > 1,269*   TROPONIN T ng/mL  --   --   --   --  <0.010    < > = values in this interval not displayed.     Results from last 7 days   Lab Units 01/10/22  0032 01/08/22  2355 01/08/22  0038 01/07/22  0035 01/07/22  0035 01/05/22  0850 01/05/22  0603 01/05/22  0231 01/04/22  2112 01/04/22  1752 01/04/22 1752   CRP mg/dL  --   --   --   --  44.76*  --  54.03*  --   --    --  55.78*   LACTATE mmol/L  --   --   --   --   --   --   --  1.7 2.2*  --  2.8*   WBC 10*3/mm3 10.70 9.23 9.07   < > 8.46   < > 9.92  --   --    < > 10.65   HEMOGLOBIN g/dL 8.6* 8.8* 10.6*   < > 10.0*   < > 10.7*  --   --    < > 12.7*   HEMATOCRIT % 28.1* 28.0* 32.2*   < > 31.4*   < > 33.4*  --   --    < > 38.3   MCV fL 102.6* 98.9* 95.0   < > 98.7*   < > 96.8  --   --    < > 96.0   MCHC g/dL 30.6* 31.4* 32.9   < > 31.8   < > 32.0  --   --    < > 33.2   PLATELETS 10*3/mm3 148 133* 135*   < > 108*   < > 86*  --   --    < > 91*   INR   --   --   --   --   --   --   --   --   --   --  0.96    < > = values in this interval not displayed.     Results from last 7 days   Lab Units 01/10/22  0501   PH, ARTERIAL pH units 7.300*   PO2 ART mm Hg 86.3   PCO2, ARTERIAL mm Hg 37.9   HCO3 ART mmol/L 18.6*     Results from last 7 days   Lab Units 01/10/22  1748 01/10/22  1442 01/10/22  0551 01/10/22  0031 01/10/22  0031 01/09/22  0547 01/08/22  2355 01/08/22  1041 01/08/22  0038 01/05/22  0850 01/05/22  0603 01/04/22  1752 01/04/22  1752   SODIUM mmol/L 147* 146* 154*   < > 151*   < > 155*   < > 163*   < > 137   < > 136   POTASSIUM mmol/L 5.0 4.8 4.6   < > 4.4   < > 3.1*   < > 3.1*   < > 3.7   < > 3.3*   MAGNESIUM mg/dL  --   --   --   --  2.1  --  2.4  --  1.8   < >  --   --  1.9   CHLORIDE mmol/L 118* 119* 124*   < > 123*   < > 122*   < > 129*   < > 96*   < > 85*   CO2 mmol/L 14.8* 15.6* 17.6*   < > 17.8*   < > 20.9*   < > 20.2*   < > 12.3*   < > 18.0*   BUN mg/dL 55* 54* 50*   < > 47*   < > 34*   < > 22*   < > 27*   < > 27*   CREATININE mg/dL 5.37* 5.22* 4.96*   < > 4.41*   < > 2.69*   < > 1.22   < > 1.35*   < > 1.14   EGFR IF NONAFRICN AM mL/min/1.73 11* 11* 12*   < > 14*   < > 24*   < > 61   < > 54*   < > 66   CALCIUM mg/dL 6.8* 6.8* 6.9*   < > 6.8*   < > 7.0*   < > 7.7*   < > 8.6   < > 9.8   GLUCOSE mg/dL 138* 143* 109*   < > 118*   < > 153*   < > 139*   < > 262*   < > 292*   ALBUMIN g/dL  --   --   --   --   --   --   --    --   --   --  2.40*  --  3.52   BILIRUBIN mg/dL  --   --   --   --   --   --   --   --   --   --  0.5  --  0.8   ALK PHOS U/L  --   --   --   --   --   --   --   --   --   --  198*  --  288*   AST (SGOT) U/L  --   --   --   --   --   --   --   --   --   --  85*  --  147*   ALT (SGPT) U/L  --   --   --   --   --   --   --   --   --   --  66*  --  102*    < > = values in this interval not displayed.   Estimated Creatinine Clearance: 15.7 mL/min (A) (by C-G formula based on SCr of 5.37 mg/dL (H)).    No results found for: AMMONIA      Blood Culture   Date Value Ref Range Status   01/07/2022 Streptococcus dysgalactiae ssp equisimilis (C)  Final   01/04/2022 Proteus mirabilis (C)  Final   01/04/2022 Streptococcus dysgalactiae ssp equisimilis (C)  Final   01/04/2022 Enterococcus faecalis (C)  Final     Comment:     Infectious disease consultation is highly recommended.   01/04/2022 Gram Negative Bacilli (C)  Final   01/04/2022 Streptococcus dysgalactiae ssp equisimilis (C)  Final   01/04/2022 Enterococcus faecalis (C)  Final     Comment:     Infectious disease consultation is highly recommended.   01/04/2022 Alcaligenes faecalis ssp faecalis (C)  Final   01/04/2022 Staphylococcus, coagulase negative (C)  Final     Comment:     Probable contaminant requires clinical correlation, susceptibility not performed unless requested by physician.       No results found for: URINECX  No results found for: WOUNDCX  No results found for: STOOLCX    I have personally looked at the labs and they are summarized above.  ----------------------------------------------------------------------------------------------------------------------  Imaging Results (Last 24 Hours)     Procedure Component Value Units Date/Time    XR Chest 1 View [[id number]] Collected: 01/10/22 1450     Updated: 01/10/22 1454    Narrative:      XR CHEST 1 VW-     CLINICAL INDICATION: et tube position; S00.83XA-Contusion of other part  of head, initial encounter;  F10.231-Alcohol dependence with withdrawal  delirium        COMPARISON: 01/09/2022      TECHNIQUE: Single frontal view of the chest.     FINDINGS:     Endotracheal tube has been advanced. It overlies the tracheal air column  and does appear to be well above the antoinette.  Right basilar consolidation  There is no evidence of an acute osseous abnormality.   There are no suspicious-appearing parenchymal soft tissue nodules.          Impression:      Endotracheal tube as above     This report was finalized on 1/10/2022 2:51 PM by Dr. Jose Armando Zhu MD.       US Renal Bilateral [[id number]] Collected: 01/10/22 0818     Updated: 01/10/22 1002    Narrative:      EXAMINATION: US RENAL BILATERAL-      CLINICAL INDICATION: teresita; S00.83XA-Contusion of other part of head,  initial encounter; F10.231-Alcohol dependence with withdrawal delirium        COMPARISON: None available     PROCEDURE: Sonographic imaging of the kidneys     FINDINGS:  Imaging of the right kidney demonstrates the right kidney measuring  11.87 x 5.41 x 6.12 cm. No solid mass or hydronephrosis.     Left kidney measures 11.96 x 5.48 x 5.85 cm. No solid mass or  hydronephrosis       Impression:      Unremarkable sonographic appearance of the kidneys.     This report was finalized on 1/10/2022 10:00 AM by Dr. Jose Armando Zhu MD.           ----------------------------------------------------------------------------------------------------------------------  Assessment and Plan:    1.  Acute hypoxic respiratory failure -patient requiring minimal ventilatory support, have discussed with patient's nurse trying sedation vacation and spontaneous breathing trial.  Initial acute hypoxic respiratory failure likely secondary to inability to protect airway.    2.  Alcohol withdrawal -we will discontinue CIWA protocol and start scheduled Librium    3.  Aspiration pneumonia -continue Rocephin, Flagyl and vancomycin.    4.  Severe sepsis -likely secondary to aspiration pneumonia,  continue Rocephin, Flagyl and vancomycin.  Repeat blood cultures x2 obtained today.    5.  Bacteremia -initial blood cultures dated 1/4/2020 to demonstrate Proteus mirabilis and Enterococcus faecalis bacteremia.  Appreciate infectious disease recommendations, will continue Rocephin, Flagyl and vancomycin for now.  Repeat blood cultures x2 currently pending.    6.  Hypernatremia -appreciate nephrology recommendations, have increased free water to 40 cc/hour and have discontinued D5W.    7.  Acute kidney injury -likely secondary to acute tubular necrosis, serum creatinine has increased to 5.3.  Patient at high risk of requiring hemodialysis.  Appreciate nephrology recommendations.    Disposition patient will likely require several more days hospitalization.        Bahman Ma,   01/10/22  18:53 EST

## 2022-01-10 NOTE — NURSING NOTE
Received consult to assess meatus for skin tear.  At this time, meatus is red and intact. Will use Z-Guard as needed.

## 2022-01-10 NOTE — PROGRESS NOTES
Nephrology Progress Note      Subjective     Patient remains intubated on  MV    Objective       Vital signs :     Temp:  [98.7 °F (37.1 °C)-99.7 °F (37.6 °C)] 98.8 °F (37.1 °C)  Heart Rate:  [] 87  Resp:  [18] 18  BP: ()/(37-76) 114/70  FiO2 (%):  [35 %-100 %] 35 %      Intake/Output Summary (Last 24 hours) at 1/10/2022 1221  Last data filed at 1/10/2022 0853  Gross per 24 hour   Intake 3855.12 ml   Output 300 ml   Net 3555.12 ml       Physical Exam:    General Appearance : intubated sedated  Lungs : clear to auscultation, respirations regular  Heart :  regular rhythm & normal rate, normal S1, S2 and no murmur, no rub  Abdomen : normal bowel sounds, no masses, no hepatomegaly, no splenomegaly, soft non-tender and no guarding  Extremities : no edema, no cyanosis and no redness  Neurologic :   Sedated,  Unable to  Assess.          Laboratory Data :     Albumin No results found for: ALBUMIN   Magnesium Magnesium   Date Value Ref Range Status   01/10/2022 2.1 1.6 - 2.6 mg/dL Final   01/08/2022 2.4 1.6 - 2.6 mg/dL Final   01/08/2022 1.8 1.6 - 2.6 mg/dL Final          PTH               No results found for: PTH    CBC and coagulation:  Results from last 7 days   Lab Units 01/10/22  0032 01/08/22  2355 01/08/22  0038 01/07/22  0035 01/07/22  0035 01/05/22  0850 01/05/22  0603 01/05/22  0231 01/04/22  2112 01/04/22  1752 01/04/22 1752   LACTATE mmol/L  --   --   --   --   --   --   --  1.7 2.2*  --  2.8*   CRP mg/dL  --   --   --   --  44.76*  --  54.03*  --   --   --  55.78*   WBC 10*3/mm3 10.70 9.23 9.07   < > 8.46   < > 9.92  --   --    < > 10.65   HEMOGLOBIN g/dL 8.6* 8.8* 10.6*   < > 10.0*   < > 10.7*  --   --    < > 12.7*   HEMATOCRIT % 28.1* 28.0* 32.2*   < > 31.4*   < > 33.4*  --   --    < > 38.3   MCV fL 102.6* 98.9* 95.0   < > 98.7*   < > 96.8  --   --    < > 96.0   MCHC g/dL 30.6* 31.4* 32.9   < > 31.8   < > 32.0  --   --    < > 33.2   PLATELETS 10*3/mm3 148 133* 135*   < > 108*   < > 86*  --   --     < > 91*   INR   --   --   --   --   --   --   --   --   --   --  0.96    < > = values in this interval not displayed.     Acid/base balance:  Results from last 7 days   Lab Units 01/10/22  0501 01/09/22  0439 01/08/22  1347   PH, ARTERIAL pH units 7.300* 7.369 7.487*   PO2 ART mm Hg 86.3 83.2 56.6*   PCO2, ARTERIAL mm Hg 37.9 38.4 29.9*   HCO3 ART mmol/L 18.6* 22.1 22.6     Renal and electrolytes:  Results from last 7 days   Lab Units 01/10/22  0551 01/10/22  0031 01/09/22  1903 01/09/22  1719 01/09/22  1719 01/09/22  1219 01/09/22  1219 01/09/22  0547 01/09/22  0547 01/08/22  2355 01/08/22  2355 01/08/22  1848 01/08/22  1848 01/08/22  1041 01/08/22  0038   SODIUM mmol/L 154* 151*  --   --  151*  --  155*  --  156*   < > 155*   < > 160*   < > 163*   POTASSIUM mmol/L 4.6 4.4 4.7   < > 4.6   < > 4.4   < > 3.7   < > 3.1*   < > 3.2*   < > 3.1*   MAGNESIUM mg/dL  --  2.1  --   --   --   --   --   --   --   --  2.4  --   --   --  1.8   CHLORIDE mmol/L 124* 123*  --   --  123*   < > 125*   < > 124*   < > 122*   < > 128*   < > 129*   CO2 mmol/L 17.6* 17.8*  --   --  16.4*   < > 18.9*   < > 20.7*   < > 20.9*   < > 20.1*   < > 20.2*   BUN mg/dL 50* 47*  --   --  42*   < > 40*   < > 37*   < > 34*   < > 30*   < > 22*   CREATININE mg/dL 4.96* 4.41*  --   --  4.01*  --  3.62*  --  3.01*   < > 2.69*   < > 2.20*   < > 1.22   EGFR IF NONAFRICN AM mL/min/1.73 12* 14*  --   --  15*   < > 17*   < > 22*   < > 24*   < > 31*   < > 61   CALCIUM mg/dL 6.9* 6.8*  --   --  7.1*   < > 7.0*   < > 7.2*   < > 7.0*   < > 7.2*   < > 7.7*   PHOSPHORUS mg/dL  --  3.2  --   --   --   --   --   --   --   --  4.2  --  4.7*  --  0.7*    < > = values in this interval not displayed.     Estimated Creatinine Clearance: 16.9 mL/min (A) (by C-G formula based on SCr of 4.96 mg/dL (H)).    Liver and pancreatic function:  Results from last 7 days   Lab Units 01/05/22  0603 01/04/22  1752   ALBUMIN g/dL 2.40* 3.52   BILIRUBIN mg/dL 0.5 0.8   ALK PHOS U/L 198*  288*   AST (SGOT) U/L 85* 147*   ALT (SGPT) U/L 66* 102*   LIPASE U/L  --  30         Cardiac:      Liver and pancreatic function:  Results from last 7 days   Lab Units 01/05/22  0603 01/04/22  1752   ALBUMIN g/dL 2.40* 3.52   BILIRUBIN mg/dL 0.5 0.8   ALK PHOS U/L 198* 288*   AST (SGOT) U/L 85* 147*   ALT (SGPT) U/L 66* 102*   LIPASE U/L  --  30       Medications :     bisacodyl, 10 mg, Rectal, Daily  cefTRIAXone, 2 g, Intravenous, Q24H  chlorhexidine, 15 mL, Mouth/Throat, Q12H  enoxaparin, 40 mg, Subcutaneous, Q24H  folic acid, 1 mg, Oral, Daily  insulin aspart, 0-7 Units, Subcutaneous, Q6H  insulin detemir, 15 Units, Subcutaneous, Daily  metroNIDAZOLE, 500 mg, Intravenous, Q8H  multivitamin, 1 tablet, Per G Tube, Daily  pantoprazole, 40 mg, Intravenous, Q AM  polyethylene glycol, 17 g, Oral, Daily  sodium chloride, 10 mL, Intravenous, Q12H  sodium chloride, 10 mL, Intravenous, Q12H  thiamine, 100 mg, Oral, Daily  Vancomycin Pharmacy Intermittent Dosing, , Does not apply, Daily      dextrose, 100 mL/hr, Last Rate: 100 mL/hr (01/10/22 1106)  fentaNYL Citrate,   norepinephrine, 0.02-0.3 mcg/kg/min, Last Rate: 0.06 mcg/kg/min (01/10/22 1107)  Pharmacy Consult - Pharmacy to dose,   propofol, 5-50 mcg/kg/min, Last Rate: 15 mcg/kg/min (01/10/22 1145)          Assessment/Plan     1. HEIDI, ATN, baseline Cr around 1.2  2. Hypernatremia  3. Metabolic acidosis  4. Metabolic encephalopathy with Etoh withdrawal  4. Sepsis with bacteremia    ATN now intensifying oliguric/anuric with mild fluid overload signs, he is close to require dialysis. I have talked to patient's father about worsening renal functions, and need of dialysis, risks and benefits he was agreeable to start on dialysis if required. No emergent indication for dialysis now, will continue to monitor and if no improvement dialysis tomorrow.   Increase Free water to 40cc/h and DC D5W, change TF to nepro    Calculated free  Water deficit around 5.5L,      Negrito Solomon,  MD  01/10/22  12:21 EST

## 2022-01-10 NOTE — PLAN OF CARE
Chart reviewed this am.  Pt currently intubated.      SLP to sign off at this time.  Please reconsult w/ improved pt status to functionally participate in dysphagia assessment.     Thank you for allowing me to participate in the care of your patient-  Mariana Maddox MS [facility]          Goal Outcome Evaluation:

## 2022-01-10 NOTE — PROGRESS NOTES
Kinetics :  Vancomycin day 6    The random vancomycin level this am continues to demonstrate a sluggish elimination with the rising serum creatinine.  Will continue to perform serial levels to identify the next dosing opportunity if plan to continue.

## 2022-01-10 NOTE — PROGRESS NOTES
PROGRESS NOTE         Patient Identification:  Name:  Jose Armando Reich  Age:  56 y.o.  Sex:  male  :  1965  MRN:  [id number]  Visit Number:  [id number]  Primary Care Provider:  Provider, No Known         LOS: 5 days       ----------------------------------------------------------------------------------------------------------------------  Subjective       Chief Complaints:    Fall        Interval History:      Patient remains intubated and sedated decreased FiO2 to 35% today.  Continues to require Levophed for blood pressure support.  WBC normal.  Renal ultrasound from 1/10/2022 unremarkable.  Chest x-ray from 2022 reports right lung base infiltrate noted.    Review of Systems:    Unable to obtain.  Intubated and sedated  ----------------------------------------------------------------------------------------------------------------------      Objective       Current Hospital Meds:  bisacodyl, 10 mg, Rectal, Daily  cefTRIAXone, 2 g, Intravenous, Q24H  chlorhexidine, 15 mL, Mouth/Throat, Q12H  enoxaparin, 40 mg, Subcutaneous, Q24H  folic acid (FOLVITE) IVPB, 1 mg, Intravenous, Daily  insulin aspart, 0-7 Units, Subcutaneous, Q6H  insulin detemir, 15 Units, Subcutaneous, Daily  metroNIDAZOLE, 500 mg, Intravenous, Q8H  multivitamin IVPB, , Intravenous, Daily  pantoprazole, 40 mg, Intravenous, Q AM  polyethylene glycol, 17 g, Oral, Daily  potassium chloride, 40 mEq, Oral, Once  sodium chloride, 10 mL, Intravenous, Q12H  sodium chloride, 10 mL, Intravenous, Q12H  thiamine (VITAMIN B1) IVPB, 200 mg, Intravenous, Daily  Vancomycin Pharmacy Intermittent Dosing, , Does not apply, Daily      dextrose, 75 mL/hr, Last Rate: 75 mL/hr (22)  fentaNYL Citrate,   norepinephrine, 0.02-0.3 mcg/kg/min, Last Rate: 0.08 mcg/kg/min (01/10/22 0814)  Pharmacy Consult - Pharmacy to dose,   propofol, 5-50 mcg/kg/min, Last Rate: 20 mcg/kg/min (01/10/22  0744)      ----------------------------------------------------------------------------------------------------------------------    Vital Signs:  Temp:  [98.7 °F (37.1 °C)-100.1 °F (37.8 °C)] 98.8 °F (37.1 °C)  Heart Rate:  [] 82  Resp:  [18] 18  BP: ()/(37-76) 103/64  FiO2 (%):  [35 %-100 %] 35 %  Mean Arterial Pressure (Non-Invasive) for the past 24 hrs (Last 3 readings):   Noninvasive MAP (mmHg)   01/10/22 0602 75   01/10/22 0502 66   01/10/22 0500 69     SpO2 Percentage    01/10/22 0502 01/10/22 0602 01/10/22 0658   SpO2: 99% 99% 100%     SpO2:  [76 %-100 %] 100 %  on   ;   Device (Oxygen Therapy): ventilator    Body mass index is 20.35 kg/m².  Wt Readings from Last 3 Encounters:   01/10/22 73.8 kg (162 lb 12.8 oz)        Intake/Output Summary (Last 24 hours) at 1/10/2022 0906  Last data filed at 1/10/2022 0853  Gross per 24 hour   Intake 4005.12 ml   Output 300 ml   Net 3705.12 ml     NPO Diet  ----------------------------------------------------------------------------------------------------------------------      Physical Exam:    Constitutional:   Intubated and sedated.    HENT:  Head: Normocephalic and atraumatic.  Mouth:  Moist mucous membranes.    Eyes:  Conjunctivae and EOM are normal.  No scleral icterus.  Neck:  Neck supple.  No JVD present.    Cardiovascular:  Normal rate, regular rhythm and normal heart sounds with no murmur. No edema.  Pulmonary/Chest: Intubated and sedated.  Diminished lung sounds bilaterally.  Abdominal:  Soft.  Bowel sounds are normal.  No distension and no tenderness.   Musculoskeletal:  No edema, no tenderness, and no deformity.  No swelling or redness of joints.  Neurological:  Intubated and sedated.  Skin:  Skin is warm and dry.  RLE erythema and warmth, but improving. Scattered ulcers and abrasions.   Psychiatric:  Intubated and  sedated.    ----------------------------------------------------------------------------------------------------------------------  Results from last 7 days   Lab Units 01/08/22  0038 01/06/22  0124 01/05/22  0850 01/05/22  0315 01/05/22  0315   CK TOTAL U/L 131 548* 994*   < > 1,269*   TROPONIN T ng/mL  --   --   --   --  <0.010    < > = values in this interval not displayed.         Results from last 7 days   Lab Units 01/10/22  0501   PH, ARTERIAL pH units 7.300*   PO2 ART mm Hg 86.3   PCO2, ARTERIAL mm Hg 37.9   HCO3 ART mmol/L 18.6*     Results from last 7 days   Lab Units 01/10/22  0032 01/08/22  2355 01/08/22  0038 01/07/22  0035 01/07/22  0035 01/05/22  0850 01/05/22  0603 01/05/22  0231 01/04/22  2112 01/04/22  1752 01/04/22  1752   CRP mg/dL  --   --   --   --  44.76*  --  54.03*  --   --   --  55.78*   LACTATE mmol/L  --   --   --   --   --   --   --  1.7 2.2*  --  2.8*   WBC 10*3/mm3 10.70 9.23 9.07   < > 8.46   < > 9.92  --   --    < > 10.65   HEMOGLOBIN g/dL 8.6* 8.8* 10.6*   < > 10.0*   < > 10.7*  --   --    < > 12.7*   HEMATOCRIT % 28.1* 28.0* 32.2*   < > 31.4*   < > 33.4*  --   --    < > 38.3   MCV fL 102.6* 98.9* 95.0   < > 98.7*   < > 96.8  --   --    < > 96.0   MCHC g/dL 30.6* 31.4* 32.9   < > 31.8   < > 32.0  --   --    < > 33.2   PLATELETS 10*3/mm3 148 133* 135*   < > 108*   < > 86*  --   --    < > 91*   INR   --   --   --   --   --   --   --   --   --   --  0.96    < > = values in this interval not displayed.     Results from last 7 days   Lab Units 01/10/22  0551 01/10/22  0031 01/09/22  1903 01/09/22  1719 01/09/22  1719 01/09/22  0547 01/08/22  2355 01/08/22  1041 01/08/22  0038 01/05/22  0850 01/05/22  0603 01/04/22  1752 01/04/22  1752   SODIUM mmol/L 154* 151*  --   --  151*   < > 155*   < > 163*   < > 137   < > 136   POTASSIUM mmol/L 4.6 4.4 4.7   < > 4.6   < > 3.1*   < > 3.1*   < > 3.7   < > 3.3*   MAGNESIUM mg/dL  --  2.1  --   --   --   --  2.4  --  1.8   < >  --   --  1.9   CHLORIDE  mmol/L 124* 123*  --   --  123*   < > 122*   < > 129*   < > 96*   < > 85*   CO2 mmol/L 17.6* 17.8*  --   --  16.4*   < > 20.9*   < > 20.2*   < > 12.3*   < > 18.0*   BUN mg/dL 50* 47*  --   --  42*   < > 34*   < > 22*   < > 27*   < > 27*   CREATININE mg/dL 4.96* 4.41*  --   --  4.01*   < > 2.69*   < > 1.22   < > 1.35*   < > 1.14   EGFR IF NONAFRICN AM mL/min/1.73 12* 14*  --   --  15*   < > 24*   < > 61   < > 54*   < > 66   CALCIUM mg/dL 6.9* 6.8*  --   --  7.1*   < > 7.0*   < > 7.7*   < > 8.6   < > 9.8   GLUCOSE mg/dL 109* 118*  --   --  169*   < > 153*   < > 139*   < > 262*   < > 292*   ALBUMIN g/dL  --   --   --   --   --   --   --   --   --   --  2.40*  --  3.52   BILIRUBIN mg/dL  --   --   --   --   --   --   --   --   --   --  0.5  --  0.8   ALK PHOS U/L  --   --   --   --   --   --   --   --   --   --  198*  --  288*   AST (SGOT) U/L  --   --   --   --   --   --   --   --   --   --  85*  --  147*   ALT (SGPT) U/L  --   --   --   --   --   --   --   --   --   --  66*  --  102*    < > = values in this interval not displayed.   Estimated Creatinine Clearance: 16.9 mL/min (A) (by C-G formula based on SCr of 4.96 mg/dL (H)).  No results found for: AMMONIA    Glucose   Date/Time Value Ref Range Status   01/10/2022 0519 101 70 - 130 mg/dL Final     Comment:     Meter: [id number] : [id number] BRENT BRANSTUTTER   01/10/2022 0000 121 70 - 130 mg/dL Final     Comment:     Meter: [id number] : [id number] BRENT BRANSTUTTER   01/09/2022 1750 147 (H) 70 - 130 mg/dL Final     Comment:     Meter: [id number] : [id number] lavonne haile   01/09/2022 1059 159 (H) 70 - 130 mg/dL Final     Comment:     Meter: [id number] : [id number] kadie meyer   01/09/2022 0521 126 70 - 130 mg/dL Final     Comment:     Meter: [id number] : [id number] BRENT BRANSTUTTER   01/09/2022 0019 156 (H) 70 - 130 mg/dL Final     Comment:     Meter: [id number] : [id number] BRENT BRANSTUTTER   01/08/2022 1742 197 (H) 70 - 130 mg/dL  Final     Comment:     Meter: [id number] : [id number] KATHY BUTLER R   01/08/2022 1405 184 (H) 70 - 130 mg/dL Final     Comment:     Meter: [id number] : [id number] KATHY BUTLER R     Lab Results   Component Value Date    HGBA1C 7.60 (H) 01/04/2022     No results found for: TSH, FREET4    Blood Culture   Date Value Ref Range Status   01/07/2022 No growth at 24 hours  Preliminary   01/04/2022 Proteus mirabilis (C)  Final   01/04/2022 Streptococcus dysgalactiae ssp equisimilis (C)  Final   01/04/2022 Enterococcus faecalis (C)  Final     Comment:     Infectious disease consultation is highly recommended.   01/04/2022 Gram Negative Bacilli (C)  Final   01/04/2022 Streptococcus dysgalactiae ssp equisimilis (C)  Final   01/04/2022 Enterococcus faecalis (C)  Final     Comment:     Infectious disease consultation is highly recommended.   01/04/2022 Alcaligenes faecalis ssp faecalis (C)  Final   01/04/2022 Staphylococcus, coagulase negative (C)  Final     Comment:     Probable contaminant requires clinical correlation, susceptibility not performed unless requested by physician.       No results found for: URINECX  No results found for: WOUNDCX  No results found for: STOOLCX  No results found for: RESPCX  Pain Management Panel       Pain Management Panel Latest Ref Rng & Units 1/4/2022    AMPHETAMINES SCREEN, URINE Negative Negative    BARBITURATES SCREEN Negative Negative    BENZODIAZEPINE SCREEN, URINE Negative Negative    BUPRENORPHINEUR Negative Negative    COCAINE SCREEN, URINE Negative Negative    METHADONE SCREEN, URINE Negative Negative    METHAMPHETAMINEUR Negative Negative              ----------------------------------------------------------------------------------------------------------------------  Imaging Results (Last 24 Hours)       Procedure Component Value Units Date/Time    US Renal Bilateral [[id number]] Collected: 01/10/22 0818     Updated: 01/10/22 0819    Narrative:      EXAMINATION: US RENAL  BILATERAL-      CLINICAL INDICATION: teresita; S00.83XA-Contusion of other part of head,  initial encounter; F10.231-Alcohol dependence with withdrawal delirium        COMPARISON: None available     PROCEDURE: Sonographic imaging of the kidneys     FINDINGS:  Imaging of the right kidney demonstrates the right kidney measuring  11.87 x 5.41 x 6.12 cm. No solid mass or hydronephrosis.     Left kidney measures 11.96 x 5.48 x 5.85 cm. No solid mass or  hydronephrosis       Impression:      Unremarkable sonographic appearance of the kidneys.          XR Chest 1 View [[id number]] Collected: 01/09/22 1807     Updated: 01/09/22 1809    Narrative:      CR Chest 1 Vw    INDICATION:   Head injury. Delirium.     COMPARISON:    1/9/2022 at 1:52 AM    FINDINGS:  Portable AP view(s) of the chest.    The tip the endotracheal tube is somewhat high at the level of T2. On the previous examination it was at T2-3 showing little change. Other tubes and supporting devices are in satisfactory position. Infiltrate at the right lung base is again noted. The  left lung is clear. The heart and mediastinum are stable.          Impression:      Right lung base infiltrate is again noted. The tip the endotracheal tube is somewhat high at the level of T2 and the tube could be advanced 2 to 3 cm for optimal positioning.    Signer Name: Michael Naik MD   Signed: 1/9/2022 6:07 PM   Workstation Name: [id number]    [facility]            ----------------------------------------------------------------------------------------------------------------------    Assessment/Plan       Assessment/Plan     ASSESSMENT:    1. Severe sepsis with lactic acid greater than 2 on admission  2. Bacteremia  3. Cellulitis right lower extremitiy    PLAN:    Patient remains intubated and sedated decreased FiO2 to 35% today.  Continues to require Levophed for blood pressure support.  WBC normal.  Renal ultrasound from 1/10/2022 unremarkable.  Chest x-ray  from 1/9/2022 reports right lung base infiltrate noted.    Blood culture from 1/7/2022 reports growth of beta-hemolytic group G Streptococcus.    Blood cultures from 1/4/2022 finalized as 1 out of 2 sets positive for Streptococcus dysgalactiae, Enterococcus faecalis, Alcaligenes faecalis, coagulase-negative Staphylococcus and noted 2 sets positive for Proteus mirabilis, Streptococcus dysgalactiae, Enterococcus faecalis.     MRSA PCR negative. COVID-19 and influenza PCR negative. Lactic 2.2 on admission. Urinalysis unremarkable for infection.CT of the abdomen and pelvis from 1/5/22 reports diffuse fatty change of the liver. CT Chest from 1/5/22 unremarkable. CT of the facial bones from 1/4/22 reports significant periapical lucency seen on the lower left and the mandible. CT of the head from 1/4/22 reports significant periapical lucency seen on the lower left and the mandible.    2D echo from 1/7/2022 reports no evidence of vegetation.    On 1/8/22 patient experienced aspiration event this afternoon and rapid response was called, patient became pulseless and CODE BLUE was called, ROSC was obtained.  Patient was intubated.      Recommend to continue Rocephin, vancomycin, Flagyl for now.  Repeat blood cultures x2 ordered for today.  We will continue to follow closely and adjust antibiotic therapy as needed.    Code Status:   Code Status and Medical Interventions:   Ordered at: 01/05/22 0246     Level Of Support Discussed With:    Patient     Code Status (Patient has no pulse and is not breathing):    CPR (Attempt to Resuscitate)     Medical Interventions (Patient has pulse or is breathing):    Full Support       ANI New  01/10/22  09:06 EST

## 2022-01-10 NOTE — PLAN OF CARE
Problem: Skin and Tissue Injury (Mechanical Ventilation, Invasive)  Goal: Absence of Device-Related Skin and Tissue Injury  Outcome: Ongoing, Progressing  Intervention: Maintain Skin and Tissue Health  Recent Flowsheet Documentation  Taken 1/10/2022 0744 by Antoinette Mejia, RN  Device Skin Pressure Protection:   absorbent pad utilized/changed   adhesive use limited   positioning supports utilized   Goal Outcome Evaluation:

## 2022-01-10 NOTE — PROCEDURES
Bronchoscopy Procedure Note    Procedure:  1. Bronchoscopy, Therapeutic    Pre-Operative Diagnosis: mucous plugging, pneumonia    Post-Operative Diagnosis: Same    Indication: mucous plugging pneumonia    Anesthesia: icu meds    Procedure Details: Patient was consented for the procedure with all risks and benefits of the procedure explained in detail.  Patient was given the opportunity to ask questions and all concerns were answered.  The bronchocope was inserted into the main airway via the endotracheal tube. An anatomical survey was done of the main airways and the subsegmental bronchus.  The findings are reported above.  A bronchoalveolar lavage was performed using aliquots of normal saline instilled into the airways then aspirated back. Bal performed in the right lower lobe.  thereapeutic suctioning was performed throught the entire tracheobronchial tree with success    Findings: extenisve mucous plugging throughout the airways, and et tube above the antoinette    Estimated Blood Loss:  Minimal           Specimens:  Sent purulent fluid                Complications:  None; patient tolerated the procedure well.           Disposition: ICU - intubated and hemodynamically stable.      Patient tolerated the procedure well.    While I was in the room and during my examination of the patient I wore gown, gloves, mask, eye protection and washed my hands before and after the encounter.  Proper enhanced droplet precautions and isolation precautions were taken.    Keon Gomez MD  1/10/2022  14:40 EST

## 2022-01-11 NOTE — PROGRESS NOTES
PROGRESS NOTE         Patient Identification:  Name:  Jose Armando Reich  Age:  56 y.o.  Sex:  male  :  1965  MRN:  [id number]  Visit Number:  [id number]  Primary Care Provider:  Provider, No Known         LOS: 6 days       ----------------------------------------------------------------------------------------------------------------------  Subjective       Chief Complaints:    Fall        Interval History:      Patient remains intubated and sedated decrease FiO2 to 30% today.  WBC 12.88.  CRP improving at 25.26.  Status post bronchoscopy on 1/10/2022.  Respiratory culture from 1/10/2022 currently in process.  Blood cultures from 1/10/2022 show no growth thus far.  Chest x-ray from 2022 reports minimal right basilar airspace disease.    Review of Systems:    Unable to obtain.  Intubated and sedated  ----------------------------------------------------------------------------------------------------------------------      Objective       Current Hospital Meds:  bisacodyl, 10 mg, Rectal, Daily  cefTRIAXone, 2 g, Intravenous, Q24H  chlorhexidine, 15 mL, Mouth/Throat, Q12H  enoxaparin, 40 mg, Subcutaneous, Q24H  folic acid, 1 mg, Oral, Daily  insulin aspart, 0-7 Units, Subcutaneous, Q6H  insulin detemir, 15 Units, Subcutaneous, Daily  metroNIDAZOLE, 500 mg, Intravenous, Q8H  midodrine, 10 mg, Oral, TID AC  multivitamin, 1 tablet, Per G Tube, Daily  pantoprazole, 40 mg, Intravenous, Q AM  polyethylene glycol, 17 g, Oral, Daily  sodium chloride, 10 mL, Intravenous, Q12H  sodium chloride, 10 mL, Intravenous, Q12H  thiamine, 100 mg, Oral, Daily  Vancomycin Pharmacy Intermittent Dosing, , Does not apply, Daily      norepinephrine, 0.02-0.3 mcg/kg/min, Last Rate: 0.04 mcg/kg/min (22 0614)  Pharmacy Consult - Pharmacy to dose,   propofol, 5-50 mcg/kg/min, Last Rate: Stopped (22  0923)      ----------------------------------------------------------------------------------------------------------------------    Vital Signs:  Temp:  [97.6 °F (36.4 °C)-98.1 °F (36.7 °C)] 97.8 °F (36.6 °C)  Heart Rate:  [76-98] 90  Resp:  [18] 18  BP: ()/(55-82) 113/68  FiO2 (%):  [30 %-100 %] 30 %  Mean Arterial Pressure (Non-Invasive) for the past 24 hrs (Last 3 readings):   Noninvasive MAP (mmHg)   01/11/22 0920 80   01/11/22 0905 73   01/11/22 0850 74     SpO2 Percentage    01/11/22 0905 01/11/22 0920 01/11/22 1028   SpO2: 100% 100% 100%     SpO2:  [92 %-100 %] 100 %  on   ;   Device (Oxygen Therapy): ventilator    Body mass index is 21.35 kg/m².  Wt Readings from Last 3 Encounters:   01/11/22 77.5 kg (170 lb 12.8 oz)        Intake/Output Summary (Last 24 hours) at 1/11/2022 1147  Last data filed at 1/11/2022 0800  Gross per 24 hour   Intake 2528.63 ml   Output 275 ml   Net 2253.63 ml     NPO Diet  ----------------------------------------------------------------------------------------------------------------------      Physical Exam:    Constitutional:   Intubated and sedated.    HENT:  Head: Normocephalic and atraumatic.  Mouth:  Moist mucous membranes.    Eyes:  Conjunctivae and EOM are normal.  No scleral icterus.  Neck:  Neck supple.  No JVD present.    Cardiovascular:  Normal rate, regular rhythm and normal heart sounds with no murmur. No edema.  Pulmonary/Chest: Intubated and sedated.  Diminished lung sounds bilaterally.  Abdominal:  Soft.  Bowel sounds are normal.  No distension and no tenderness.   Musculoskeletal:  No edema, no tenderness, and no deformity.  No swelling or redness of joints.  Neurological:  Intubated and sedated.  Skin:  Skin is warm and dry.  RLE erythema and warmth, but improving. Scattered ulcers and abrasions.   Psychiatric:  Intubated and  sedated.    ----------------------------------------------------------------------------------------------------------------------  Results from last 7 days   Lab Units 01/08/22  0038 01/06/22  0124 01/05/22  0850 01/05/22  0315 01/05/22  0315   CK TOTAL U/L 131 548* 994*   < > 1,269*   TROPONIN T ng/mL  --   --   --   --  <0.010    < > = values in this interval not displayed.         Results from last 7 days   Lab Units 01/11/22  0501   PH, ARTERIAL pH units 7.240*   PO2 ART mm Hg 134.0*   PCO2, ARTERIAL mm Hg 39.1   HCO3 ART mmol/L 16.7*     Results from last 7 days   Lab Units 01/10/22  2354 01/10/22  0032 01/08/22  2355 01/08/22  0038 01/07/22  0035 01/05/22  0850 01/05/22  0603 01/05/22  0231 01/04/22  2112 01/04/22  1752 01/04/22  1752   CRP mg/dL 25.26*  --   --   --  44.76*  --  54.03*  --   --    < > 55.78*   LACTATE mmol/L  --   --   --   --   --   --   --  1.7 2.2*  --  2.8*   WBC 10*3/mm3 12.88* 10.70 9.23   < > 8.46   < > 9.92  --   --    < > 10.65   HEMOGLOBIN g/dL 9.3* 8.6* 8.8*   < > 10.0*   < > 10.7*  --   --    < > 12.7*   HEMATOCRIT % 31.0* 28.1* 28.0*   < > 31.4*   < > 33.4*  --   --    < > 38.3   MCV fL 103.3* 102.6* 98.9*   < > 98.7*   < > 96.8  --   --    < > 96.0   MCHC g/dL 30.0* 30.6* 31.4*   < > 31.8   < > 32.0  --   --    < > 33.2   PLATELETS 10*3/mm3 176 148 133*   < > 108*   < > 86*  --   --    < > 91*   INR   --   --   --   --   --   --   --   --   --   --  0.96    < > = values in this interval not displayed.     Results from last 7 days   Lab Units 01/11/22  0657 01/10/22  2354 01/10/22  1748 01/10/22  0551 01/10/22  0031 01/09/22  0547 01/08/22  2355 01/05/22  0850 01/05/22  0603 01/04/22  1752 01/04/22  1752   SODIUM mmol/L 146* 147* 147*   < > 151*   < > 155*   < > 137   < > 136   POTASSIUM mmol/L 4.9 4.9 5.0   < > 4.4   < > 3.1*   < > 3.7   < > 3.3*   MAGNESIUM mg/dL  --  2.1  --   --  2.1  --  2.4   < >  --   --  1.9   CHLORIDE mmol/L 118* 119* 118*   < > 123*   < > 122*   < >  96*   < > 85*   CO2 mmol/L 15.5* 14.3* 14.8*   < > 17.8*   < > 20.9*   < > 12.3*   < > 18.0*   BUN mg/dL 65* 59* 55*   < > 47*   < > 34*   < > 27*   < > 27*   CREATININE mg/dL 5.92* 5.64* 5.37*   < > 4.41*   < > 2.69*   < > 1.35*   < > 1.14   EGFR IF NONAFRICN AM mL/min/1.73 10* 10* 11*   < > 14*   < > 24*   < > 54*   < > 66   CALCIUM mg/dL 7.0* 6.8* 6.8*   < > 6.8*   < > 7.0*   < > 8.6   < > 9.8   GLUCOSE mg/dL 210* 138* 138*   < > 118*   < > 153*   < > 262*   < > 292*   ALBUMIN g/dL  --   --   --   --   --   --   --   --  2.40*  --  3.52   BILIRUBIN mg/dL  --   --   --   --   --   --   --   --  0.5  --  0.8   ALK PHOS U/L  --   --   --   --   --   --   --   --  198*  --  288*   AST (SGOT) U/L  --   --   --   --   --   --   --   --  85*  --  147*   ALT (SGPT) U/L  --   --   --   --   --   --   --   --  66*  --  102*    < > = values in this interval not displayed.   Estimated Creatinine Clearance: 14.9 mL/min (A) (by C-G formula based on SCr of 5.92 mg/dL (H)).  Ammonia   Date Value Ref Range Status   01/10/2022 28 16 - 60 umol/L Final       Glucose   Date/Time Value Ref Range Status   01/11/2022 0508 195 (H) 70 - 130 mg/dL Final     Comment:     Meter: [id number] : [id number] BRENT [facility]   01/11/2022 0114 149 (H) 70 - 130 mg/dL Final     Comment:     Meter: [id number] : [id number] BRENT BRANSTUTTER   01/10/2022 1716 127 70 - 130 mg/dL Final     Comment:     Meter: [id number] : [id number] Roberto Curry L   01/10/2022 1137 127 70 - 130 mg/dL Final     Comment:     Meter: [id number] : [id number] Roberto Curry L   01/10/2022 0519 101 70 - 130 mg/dL Final     Comment:     Meter: [id number] : [id number] BRENT BRANSTUTTER   01/10/2022 0000 121 70 - 130 mg/dL Final     Comment:     Meter: [id number] : [id number] BRENT BRANSTUTTER   01/09/2022 1750 147 (H) 70 - 130 mg/dL Final     Comment:     Meter: [id number] : [id number] lavonne haile   01/09/2022 1059 159 (H) 70 - 130 mg/dL Final      Comment:     Meter: [id number] : [id number] kadie meyer     Lab Results   Component Value Date    HGBA1C 7.60 (H) 01/04/2022     No results found for: TSH, FREET4    Blood Culture   Date Value Ref Range Status   01/07/2022 No growth at 24 hours  Preliminary   01/04/2022 Proteus mirabilis (C)  Final   01/04/2022 Streptococcus dysgalactiae ssp equisimilis (C)  Final   01/04/2022 Enterococcus faecalis (C)  Final     Comment:     Infectious disease consultation is highly recommended.   01/04/2022 Gram Negative Bacilli (C)  Final   01/04/2022 Streptococcus dysgalactiae ssp equisimilis (C)  Final   01/04/2022 Enterococcus faecalis (C)  Final     Comment:     Infectious disease consultation is highly recommended.   01/04/2022 Alcaligenes faecalis ssp faecalis (C)  Final   01/04/2022 Staphylococcus, coagulase negative (C)  Final     Comment:     Probable contaminant requires clinical correlation, susceptibility not performed unless requested by physician.       No results found for: URINECX  No results found for: WOUNDCX  No results found for: STOOLCX  No results found for: RESPCX  Pain Management Panel       Pain Management Panel Latest Ref Rng & Units 1/4/2022    AMPHETAMINES SCREEN, URINE Negative Negative    BARBITURATES SCREEN Negative Negative    BENZODIAZEPINE SCREEN, URINE Negative Negative    BUPRENORPHINEUR Negative Negative    COCAINE SCREEN, URINE Negative Negative    METHADONE SCREEN, URINE Negative Negative    METHAMPHETAMINEUR Negative Negative              ----------------------------------------------------------------------------------------------------------------------  Imaging Results (Last 24 Hours)       Procedure Component Value Units Date/Time    XR Chest 1 View [[id number]] Collected: 01/11/22 0753     Updated: 01/11/22 0756    Narrative:      XR CHEST 1 VW-     CLINICAL INDICATION: intubated; S00.83XA-Contusion of other part of  head, initial encounter; F10.231-Alcohol dependence with  withdrawal  delirium        COMPARISON: 01/10/2022      TECHNIQUE: Single frontal view of the chest.     FINDINGS:     Minimal right basilar airspace disease  Endotracheal tube has been partially retracted. The tip is at the  superior margin of the sternoclavicular junction.  There is no evidence of an acute osseous abnormality.   There are no suspicious-appearing parenchymal soft tissue nodules.          Impression:      Aeration status similar to the previous study     This report was finalized on 1/11/2022 7:53 AM by Dr. Jose Armando Zhu MD.       XR Chest 1 View [[id number]] Collected: 01/10/22 1450     Updated: 01/10/22 1454    Narrative:      XR CHEST 1 VW-     CLINICAL INDICATION: et tube position; S00.83XA-Contusion of other part  of head, initial encounter; F10.231-Alcohol dependence with withdrawal  delirium        COMPARISON: 01/09/2022      TECHNIQUE: Single frontal view of the chest.     FINDINGS:     Endotracheal tube has been advanced. It overlies the tracheal air column  and does appear to be well above the antoinette.  Right basilar consolidation  There is no evidence of an acute osseous abnormality.   There are no suspicious-appearing parenchymal soft tissue nodules.          Impression:      Endotracheal tube as above     This report was finalized on 1/10/2022 2:51 PM by Dr. Jose Armando Zhu MD.               ----------------------------------------------------------------------------------------------------------------------    Assessment/Plan       Assessment/Plan     ASSESSMENT:    1. Severe sepsis with lactic acid greater than 2 on admission  2. Bacteremia  3. Cellulitis right lower extremitiy    PLAN:    Patient remains intubated and sedated decrease FiO2 to 30% today.  WBC 12.88.  CRP improving at 25.26. Status post bronchoscopy on 1/10/2022.  Respiratory culture from 1/10/2022 currently in process.  Blood cultures from 1/10/2022 show no growth thus far.  Chest x-ray from 1/11/2022 reports minimal  right basilar airspace disease.    Blood culture from 1/7/2022 reports growth of beta-hemolytic group G Streptococcus.    Blood cultures from 1/4/2022 finalized as 1 out of 2 sets positive for Streptococcus dysgalactiae, Enterococcus faecalis, Alcaligenes faecalis, coagulase-negative Staphylococcus and noted 2 sets positive for Proteus mirabilis, Streptococcus dysgalactiae, Enterococcus faecalis.     MRSA PCR negative. COVID-19 and influenza PCR negative. Lactic 2.2 on admission. Urinalysis unremarkable for infection.CT of the abdomen and pelvis from 1/5/22 reports diffuse fatty change of the liver. CT Chest from 1/5/22 unremarkable. CT of the facial bones from 1/4/22 reports significant periapical lucency seen on the lower left and the mandible. CT of the head from 1/4/22 reports significant periapical lucency seen on the lower left and the mandible.    2D echo from 1/7/2022 reports no evidence of vegetation.    On 1/8/22 patient experienced aspiration event this afternoon and rapid response was called, patient became pulseless and CODE BLUE was called, ROSC was obtained.  Patient was intubated.      Recommend to continue Rocephin, vancomycin, Flagyl for now. We will continue to follow closely and adjust antibiotic therapy as needed.    Code Status:   Code Status and Medical Interventions:   Ordered at: 01/10/22 1439     Medical Intervention Limits:    NO dialysis     Level Of Support Discussed With:    Next of Kin (If No Surrogate)     Code Status (Patient has no pulse and is not breathing):    CPR (Attempt to Resuscitate)     Medical Interventions (Patient has pulse or is breathing):    Limited Support       ANI New  01/11/22  11:47 EST

## 2022-01-11 NOTE — PROGRESS NOTES
Nephrology Progress Note      Subjective     Patient remains intubated on  MV, FIO2 40%    Objective       Vital signs :     Temp:  [97.6 °F (36.4 °C)-98.1 °F (36.7 °C)] 97.8 °F (36.6 °C)  Heart Rate:  [76-98] 82  Resp:  [18] 18  BP: ()/(55-82) 122/72  FiO2 (%):  [30 %-100 %] 30 %      Intake/Output Summary (Last 24 hours) at 1/11/2022 0800  Last data filed at 1/11/2022 0610  Gross per 24 hour   Intake 2898.63 ml   Output 275 ml   Net 2623.63 ml       Physical Exam:    General Appearance : intubated sedated  Lungs : clear to auscultation, respirations regular  Heart :  regular rhythm & normal rate, normal S1, S2 and no murmur, no rub  Abdomen : normal bowel sounds, no masses, no hepatomegaly, no splenomegaly, soft non-tender and no guarding  Extremities : no edema, no cyanosis and no redness  Neurologic :   Sedated,  Unable to  Assess.          Laboratory Data :     Albumin No results found for: ALBUMIN   Magnesium Magnesium   Date Value Ref Range Status   01/10/2022 2.1 1.6 - 2.6 mg/dL Final   01/10/2022 2.1 1.6 - 2.6 mg/dL Final   01/08/2022 2.4 1.6 - 2.6 mg/dL Final          PTH               No results found for: PTH    CBC and coagulation:  Results from last 7 days   Lab Units 01/10/22  2354 01/10/22  0032 01/08/22  2355 01/08/22  0038 01/07/22  0035 01/05/22  0850 01/05/22  0603 01/05/22  0231 01/04/22  2112 01/04/22  1752 01/04/22  1752   LACTATE mmol/L  --   --   --   --   --   --   --  1.7 2.2*  --  2.8*   CRP mg/dL 25.26*  --   --   --  44.76*  --  54.03*  --   --    < > 55.78*   WBC 10*3/mm3 12.88* 10.70 9.23   < > 8.46   < > 9.92  --   --    < > 10.65   HEMOGLOBIN g/dL 9.3* 8.6* 8.8*   < > 10.0*   < > 10.7*  --   --    < > 12.7*   HEMATOCRIT % 31.0* 28.1* 28.0*   < > 31.4*   < > 33.4*  --   --    < > 38.3   MCV fL 103.3* 102.6* 98.9*   < > 98.7*   < > 96.8  --   --    < > 96.0   MCHC g/dL 30.0* 30.6* 31.4*   < > 31.8   < > 32.0  --   --    < > 33.2   PLATELETS 10*3/mm3 176 148 133*   < > 108*   < >  86*  --   --    < > 91*   INR   --   --   --   --   --   --   --   --   --   --  0.96    < > = values in this interval not displayed.     Acid/base balance:  Results from last 7 days   Lab Units 01/11/22  0501 01/10/22  0501 01/09/22  0439   PH, ARTERIAL pH units 7.240* 7.300* 7.369   PO2 ART mm Hg 134.0* 86.3 83.2   PCO2, ARTERIAL mm Hg 39.1 37.9 38.4   HCO3 ART mmol/L 16.7* 18.6* 22.1     Renal and electrolytes:  Results from last 7 days   Lab Units 01/11/22  0657 01/10/22  2354 01/10/22  1748 01/10/22  1442 01/10/22  1442 01/10/22  0551 01/10/22  0551 01/10/22  0031 01/10/22  0031 01/09/22  0547 01/08/22  2355   SODIUM mmol/L 146* 147* 147*  --  146*  --  154*   < > 151*   < > 155*   POTASSIUM mmol/L 4.9 4.9 5.0   < > 4.8   < > 4.6   < > 4.4   < > 3.1*   MAGNESIUM mg/dL  --  2.1  --   --   --   --   --   --  2.1  --  2.4   CHLORIDE mmol/L 118* 119* 118*   < > 119*   < > 124*   < > 123*   < > 122*   CO2 mmol/L 15.5* 14.3* 14.8*   < > 15.6*   < > 17.6*   < > 17.8*   < > 20.9*   BUN mg/dL 65* 59* 55*   < > 54*   < > 50*   < > 47*   < > 34*   CREATININE mg/dL 5.92* 5.64* 5.37*  --  5.22*  --  4.96*   < > 4.41*   < > 2.69*   EGFR IF NONAFRICN AM mL/min/1.73 10* 10* 11*   < > 11*   < > 12*   < > 14*   < > 24*   CALCIUM mg/dL 7.0* 6.8* 6.8*   < > 6.8*   < > 6.9*   < > 6.8*   < > 7.0*   PHOSPHORUS mg/dL  --  5.1*  --   --   --   --   --   --  3.2  --  4.2    < > = values in this interval not displayed.     Estimated Creatinine Clearance: 14.9 mL/min (A) (by C-G formula based on SCr of 5.92 mg/dL (H)).    Liver and pancreatic function:  Results from last 7 days   Lab Units 01/10/22  2354 01/05/22  0603 01/04/22  1752   ALBUMIN g/dL  --  2.40* 3.52   BILIRUBIN mg/dL  --  0.5 0.8   ALK PHOS U/L  --  198* 288*   AST (SGOT) U/L  --  85* 147*   ALT (SGPT) U/L  --  66* 102*   AMMONIA umol/L 28  --   --    LIPASE U/L  --   --  30         Cardiac:      Liver and pancreatic function:  Results from last 7 days   Lab Units  01/10/22  2354 01/05/22  0603 01/04/22  1752   ALBUMIN g/dL  --  2.40* 3.52   BILIRUBIN mg/dL  --  0.5 0.8   ALK PHOS U/L  --  198* 288*   AST (SGOT) U/L  --  85* 147*   ALT (SGPT) U/L  --  66* 102*   AMMONIA umol/L 28  --   --    LIPASE U/L  --   --  30       Medications :     bisacodyl, 10 mg, Rectal, Daily  cefTRIAXone, 2 g, Intravenous, Q24H  chlorhexidine, 15 mL, Mouth/Throat, Q12H  enoxaparin, 40 mg, Subcutaneous, Q24H  folic acid, 1 mg, Oral, Daily  insulin aspart, 0-7 Units, Subcutaneous, Q6H  insulin detemir, 15 Units, Subcutaneous, Daily  metroNIDAZOLE, 500 mg, Intravenous, Q8H  multivitamin, 1 tablet, Per G Tube, Daily  pantoprazole, 40 mg, Intravenous, Q AM  polyethylene glycol, 17 g, Oral, Daily  sodium chloride, 10 mL, Intravenous, Q12H  sodium chloride, 10 mL, Intravenous, Q12H  thiamine, 100 mg, Oral, Daily  Vancomycin Pharmacy Intermittent Dosing, , Does not apply, Daily      fentaNYL Citrate,   norepinephrine, 0.02-0.3 mcg/kg/min, Last Rate: 0.04 mcg/kg/min (01/11/22 0614)  Pharmacy Consult - Pharmacy to dose,   propofol, 5-50 mcg/kg/min, Last Rate: 20 mcg/kg/min (01/11/22 0120)          Assessment/Plan     1. HEIDI, ATN, baseline Cr around 1.2  2. Hypernatremia  3. Metabolic acidosis  4. Metabolic encephalopathy with Etoh withdrawal  4. Sepsis with bacteremia    Remains oliguric, with Cr further increased to 5.9. Got information from RN, that patient  changed the mind and does not want to start on dialysis  Hypernatremia is better, continue of free water and give D5W with sodium bicarb 1L today and monitor closely, will talk to family again.   Calculated free  Water deficit around 5.5L,      Negrito Solomon MD  01/11/22  08:00 EST

## 2022-01-11 NOTE — PLAN OF CARE
Problem: Adult Inpatient Plan of Care  Goal: Plan of Care Review  Outcome: Ongoing, Progressing   Pt remains int/sed. VSS. Prop, fentanyl, levo infusing. TF increased to 40ml/hr; tolerating well. Pt had 3+ BM's this shift this far. Wound care completed. BG monitored. WCTM

## 2022-01-11 NOTE — PLAN OF CARE
Problem: Adult Inpatient Plan of Care  Goal: Plan of Care Review  Outcome: Ongoing, Progressing  Flowsheets  Taken 1/11/2022 1703 by Isabella Glover, RN  Outcome Summary: pt tolerating vent settings.  Meds d'cd.  Pt unable to follow commandsl.  Pt dad and brother spoke with Dr Ma and was inquiring about possible terminal wean  Taken 1/8/2022 0334 by Marie García, RN  Plan of Care Reviewed With: patient   Goal Outcome Evaluation:              Outcome Summary: pt tolerating vent settings.  Meds d'cd.  Pt unable to follow commandsl.  Pt dad and brother spoke with Dr Ma and was inquiring about possible terminal wean

## 2022-01-11 NOTE — PROGRESS NOTES
"Call Pulmonary Care     Mar/chart reviewed  Follow up AHRF, pneumonia, alcohol withdrawal  Patient sedated on vent unable to provide subjective    Vital Sign Min/Max for last 24 hours  Temp  Min: 97.6 °F (36.4 °C)  Max: 98.1 °F (36.7 °C)   BP  Min: 87/56  Max: 134/77   Pulse  Min: 76  Max: 98   Resp  Min: 18  Max: 18   SpO2  Min: 92 %  Max: 100 %   No data recorded   Weight  Min: 77.5 kg (170 lb 12.8 oz)  Max: 77.5 kg (170 lb 12.8 oz)   2898/275  Appears ill, sedated on vent  Perrl,normal sclera  mmm, no jvd, trachea midline, neck supple,  chest decreased coarse bilaterally, no crackles, no wheezes,   rrr,   soft, nt, nd +bs,  no c/c/ e  Skin warm, dry no rashes    Labs: 1/11: reviewed:  Glucose 221  Bun 67  Cr 6.09  Na 143  Bicarb 14.4  7.24/39/134  Ammonia 28  Wbc 12.8  hgb 9.3  plts 176    CXR: 1/11: et tube appears high again    A/P:  1. Acute hypoxemic respiratory failure -- oxygenation not bad, daily sbt/weaning trials. --will hold off on extubation now given acidemia, possible need for dialysis  2. Alcohol withdrawal, abuse and dependence with delerium tremens -- no longer needing prn benzos  3. Aspiration pneumonia -- antibitoics per ID  4. S/p code blue arrest -- sounds like was primarily a respiratory arrest due to inability to protect airway  5. HEIDI -- nephrology following  6. Hypernatremia \"  \"  7. Megaloblastic anemia  8. Bacteremia  9. Acidemia    Acidemia may neccesitate dialysis at this point. Father seemed unsure if patient would want dialysis. He is asking me about palliative care, I discussed with him with RN present.  I explained patient does have possibility of recovery but I would anticipate it taking some time, weeks to months under best case scenario and his recovery would not be gaurentteed. Explained his quality of life would not be better than prior to this incident.    Had RT advance ET tube again, unclear why it keeps backing out    CC 35 mins.   "

## 2022-01-11 NOTE — PROGRESS NOTES
[facility] HOSPITALIST PROGRESS NOTE     Patient Identification:  Name:  Jose Armando Reich  Age:  56 y.o.  Sex:  male  :  1965  MRN:  [id number]  Visit Number:  [id number]  Primary Care Provider:  Provider, No Known    Length of stay:  6    Chief complaint: Intubated and sedated    Subjective:    Patient remains intubated and sedated.  Patient's father was present at bedside and did have lengthy conversation with him regarding overall goals of care.  Spent approximately 1 hour speaking with him and his other son via telephone.  Patient's father states the patient would not want to live on permanent hemodialysis and is concerned about current direction of care.  Did have lengthy conversation regarding probable successful extubation in the near future.  It is currently uncertain whether patient will require hemodialysis, and if he does it is uncertain if he will require long-term/lifelong hemodialysis.  After lengthy conversation discussing multiple possible outcomes, patient's father has requested an ethics committee consultation for consideration of withdrawal of care.  In the meantime, we will continue to hold sedation and attempt spontaneous breathing trial once patient is awake.  If patient is able to be extubated and he is capable of making medical decisions, we will then defer further plan of care to the patient himself.  ----------------------------------------------------------------------------------------------------------------------  Current Hospital Meds:  bisacodyl, 10 mg, Rectal, Daily  cefTRIAXone, 2 g, Intravenous, Q24H  chlorhexidine, 15 mL, Mouth/Throat, Q12H  enoxaparin, 40 mg, Subcutaneous, Q24H  folic acid, 1 mg, Oral, Daily  insulin aspart, 0-7 Units, Subcutaneous, Q6H  insulin detemir, 15 Units, Subcutaneous, Daily  metroNIDAZOLE, 500 mg, Intravenous, Q8H  midodrine, 10 mg, Oral, TID AC  multivitamin, 1 tablet, Per G Tube, Daily  pantoprazole, 40 mg, Intravenous, Q  AM  polyethylene glycol, 17 g, Oral, Daily  sodium chloride, 10 mL, Intravenous, Q12H  sodium chloride, 10 mL, Intravenous, Q12H  thiamine, 100 mg, Oral, Daily  Vancomycin Pharmacy Intermittent Dosing, , Does not apply, Daily      norepinephrine, 0.02-0.3 mcg/kg/min, Last Rate: Stopped (01/11/22 1520)  Pharmacy Consult - Pharmacy to dose,   Pharmacy Consult - Pharmacy to dose,   propofol, 5-50 mcg/kg/min, Last Rate: Stopped (01/11/22 0923)      ----------------------------------------------------------------------------------------------------------------------  Vital Signs:  Temp:  [97.4 °F (36.3 °C)-98.1 °F (36.7 °C)] 97.7 °F (36.5 °C)  Heart Rate:  [76-96] 84  Resp:  [18] 18  BP: ()/(55-83) 119/74  FiO2 (%):  [30 %-35 %] 30 %      01/09/22  0552 01/10/22  0500 01/11/22  0526   Weight: 75.3 kg (166 lb) 73.8 kg (162 lb 12.8 oz) 77.5 kg (170 lb 12.8 oz)     Body mass index is 21.35 kg/m².    Intake/Output Summary (Last 24 hours) at 1/11/2022 1809  Last data filed at 1/11/2022 1454  Gross per 24 hour   Intake 2043.9 ml   Output 125 ml   Net 1918.9 ml     NPO Diet  ----------------------------------------------------------------------------------------------------------------------  Physical exam:  Constitutional: Acutely ill-appearing  male, currently intubated and sedated.     HENT:  Head:  Normocephalic and atraumatic.  Mouth:  Moist mucous membranes.    Eyes:  Conjunctivae are normal.   No scleral icterus.    Neck:  Neck supple. No thyromegaly.  No JVD present.    Cardiovascular:  Regular rate and rhythm with no murmurs, rubs, clicks or gallops appreciated.  Pulmonary/Chest:  Clear to auscultation bilaterally with no crackles, wheezes or rhonchi appreciated.  Abdominal:  Soft. Nontender. Nondistended  Bowel sounds are normal in all four quadrants. No organomegally appreciated.   Musculoskeletal:  No edema, no tenderness, and no deformity.  No red or swollen joints anywhere.    Neurological:  Intubated and sedated.   Skin:  Warm and dry to palpation with no rashes or lesions appreciated.  Peripheral vascular:  2+ radial and pedal pulses in bilateral upper and lower extremities.    ------------------------------------------------------------------------------    ----------------------------------------------------------------------------------------------------------------------  Results from last 7 days   Lab Units 01/08/22  0038 01/06/22  0124 01/05/22  0850 01/05/22  0315 01/05/22  0315   CK TOTAL U/L 131 548* 994*   < > 1,269*   TROPONIN T ng/mL  --   --   --   --  <0.010    < > = values in this interval not displayed.     Results from last 7 days   Lab Units 01/10/22  2354 01/10/22  0032 01/08/22  2355 01/08/22  0038 01/07/22  0035 01/05/22  0850 01/05/22  0603 01/05/22  0231 01/04/22  2112   CRP mg/dL 25.26*  --   --   --  44.76*  --  54.03*  --   --    LACTATE mmol/L  --   --   --   --   --   --   --  1.7 2.2*   WBC 10*3/mm3 12.88* 10.70 9.23   < > 8.46   < > 9.92  --   --    HEMOGLOBIN g/dL 9.3* 8.6* 8.8*   < > 10.0*   < > 10.7*  --   --    HEMATOCRIT % 31.0* 28.1* 28.0*   < > 31.4*   < > 33.4*  --   --    MCV fL 103.3* 102.6* 98.9*   < > 98.7*   < > 96.8  --   --    MCHC g/dL 30.0* 30.6* 31.4*   < > 31.8   < > 32.0  --   --    PLATELETS 10*3/mm3 176 148 133*   < > 108*   < > 86*  --   --     < > = values in this interval not displayed.     Results from last 7 days   Lab Units 01/11/22  0501   PH, ARTERIAL pH units 7.240*   PO2 ART mm Hg 134.0*   PCO2, ARTERIAL mm Hg 39.1   HCO3 ART mmol/L 16.7*     Results from last 7 days   Lab Units 01/11/22  1140 01/11/22  0657 01/10/22  2354 01/10/22  0551 01/10/22  0031 01/09/22  0547 01/08/22  2355 01/05/22  0850 01/05/22  0603   SODIUM mmol/L 143 146* 147*   < > 151*   < > 155*   < > 137   POTASSIUM mmol/L 4.8 4.9 4.9   < > 4.4   < > 3.1*   < > 3.7   MAGNESIUM mg/dL  --   --  2.1  --  2.1  --  2.4   < >  --    CHLORIDE mmol/L 115* 118* 119*   < > 123*   <  > 122*   < > 96*   CO2 mmol/L 14.4* 15.5* 14.3*   < > 17.8*   < > 20.9*   < > 12.3*   BUN mg/dL 67* 65* 59*   < > 47*   < > 34*   < > 27*   CREATININE mg/dL 6.09* 5.92* 5.64*   < > 4.41*   < > 2.69*   < > 1.35*   EGFR IF NONAFRICN AM mL/min/1.73 10* 10* 10*   < > 14*   < > 24*   < > 54*   CALCIUM mg/dL 7.0* 7.0* 6.8*   < > 6.8*   < > 7.0*   < > 8.6   GLUCOSE mg/dL 221* 210* 138*   < > 118*   < > 153*   < > 262*   ALBUMIN g/dL  --   --   --   --   --   --   --   --  2.40*   BILIRUBIN mg/dL  --   --   --   --   --   --   --   --  0.5   ALK PHOS U/L  --   --   --   --   --   --   --   --  198*   AST (SGOT) U/L  --   --   --   --   --   --   --   --  85*   ALT (SGPT) U/L  --   --   --   --   --   --   --   --  66*    < > = values in this interval not displayed.   Estimated Creatinine Clearance: 14.5 mL/min (A) (by C-G formula based on SCr of 6.09 mg/dL (H)).    Ammonia   Date Value Ref Range Status   01/10/2022 28 16 - 60 umol/L Final         Blood Culture   Date Value Ref Range Status   01/07/2022 Streptococcus dysgalactiae ssp equisimilis (C)  Final   01/04/2022 Proteus mirabilis (C)  Final   01/04/2022 Streptococcus dysgalactiae ssp equisimilis (C)  Final   01/04/2022 Enterococcus faecalis (C)  Final     Comment:     Infectious disease consultation is highly recommended.   01/04/2022 Gram Negative Bacilli (C)  Final   01/04/2022 Streptococcus dysgalactiae ssp equisimilis (C)  Final   01/04/2022 Enterococcus faecalis (C)  Final     Comment:     Infectious disease consultation is highly recommended.   01/04/2022 Alcaligenes faecalis ssp faecalis (C)  Final   01/04/2022 Staphylococcus, coagulase negative (C)  Final     Comment:     Probable contaminant requires clinical correlation, susceptibility not performed unless requested by physician.       No results found for: URINECX  No results found for: WOUNDCX  No results found for: STOOLCX    I have personally looked at the labs and they are summarized  above.  ----------------------------------------------------------------------------------------------------------------------  Imaging Results (Last 24 Hours)     Procedure Component Value Units Date/Time    XR Chest 1 View [[id number]] Collected: 01/11/22 0753     Updated: 01/11/22 0756    Narrative:      XR CHEST 1 VW-     CLINICAL INDICATION: intubated; S00.83XA-Contusion of other part of  head, initial encounter; F10.231-Alcohol dependence with withdrawal  delirium        COMPARISON: 01/10/2022      TECHNIQUE: Single frontal view of the chest.     FINDINGS:     Minimal right basilar airspace disease  Endotracheal tube has been partially retracted. The tip is at the  superior margin of the sternoclavicular junction.  There is no evidence of an acute osseous abnormality.   There are no suspicious-appearing parenchymal soft tissue nodules.          Impression:      Aeration status similar to the previous study     This report was finalized on 1/11/2022 7:53 AM by Dr. Jose Armando Zhu MD.           ----------------------------------------------------------------------------------------------------------------------  Assessment and Plan:    1.  Acute hypoxic respiratory failure -continue to hold sedation and once able to follow commands, will attempt spontaneous breathing trial.     2.  Alcohol withdrawal -CIWA protocol currently on hold, will start scheduled Librium once patient awakens if he continues to demonstrate signs of withdrawal.     3.  Aspiration pneumonia -continue Rocephin, Flagyl and vancomycin per infectious disease recommendations.    4.  Severe sepsis -likely secondary to aspiration pneumonia, continue Rocephin, Flagyl and vancomycin.  Repeat blood cultures obtained yesterday are negative to date.    5.  Bacteremia -initial blood cultures dated 1/4/2020 to demonstrate Proteus mirabilis and Enterococcus faecalis bacteremia.  Appreciate infectious disease recommendations, will continue Rocephin, Flagyl  and vancomycin for now.  Repeat blood cultures x2 currently pending.    6.  Hypernatremia -resolved with change to free water administration.  Patient was given D5W with sodium bicarb x1 L today.  Appreciate nephrology recommendations    7.  Acute kidney injury -unfortunately, serum creatinine continues to worsen.  Patient with metabolic acidosis as well.  Patient with no evidence of hyperkalemia at this time, no absolute need for emergent hemodialysis at this time.  However, secondary to worsening acidosis patient will likely require hemodialysis in the near future.  If this occurs and patient has not capable of making his own decisions, we will have further conversations with patient's father regarding proceeding with hemodialysis.  Patient's father currently states he does not believe the patient would want to proceed with hemodialysis at this time.    Disposition condition is critical, patient is at high risk for worsening morbidity, and will likely require hemodialysis in the near future.        Bahman Ma,   01/11/22  18:09 EST

## 2022-01-12 NOTE — PROGRESS NOTES
Kinetics :  Vancomycin  Day 8    The random vancomycin level was not compatible with getting a supplemental dose today.   Will recheck level in am and evaluate when available.

## 2022-01-12 NOTE — PROGRESS NOTES
"[facility]      Mar/chart reviewed  Follow up AHRF, pneumonia, alcohol withdrawal  Sedation off, but patient not following commands  Eyes are open, doesn't follow commands having apena on PS    Vital Sign Min/Max for last 24 hours  Temp  Min: 96.8 °F (36 °C)  Max: 97.8 °F (36.6 °C)   BP  Min: 107/64  Max: 144/88   Pulse  Min: 80  Max: 93   Resp  Min: 16  Max: 20   SpO2  Min: 98 %  Max: 100 %   Flow (L/min)  Min: 15  Max: 15   Weight  Min: 79.9 kg (176 lb 3.2 oz)  Max: 79.9 kg (176 lb 3.2 oz)   2586/400  Appears ill, poorly responsive, eyes open but doesn't make eye contact or follow commands  Perrl,normal sclera  mmm, no jvd, trachea midline, neck supple,  chest decreased coarse bilaterally, no crackles, no wheezes,   rrr,   soft, nt, nd +bs,  no c/c/ e  Skin warm, dry no rashes    Labs: 1/12: reviewed:  Glucose 337  Bun 78  Cr 6.8  Na 142  k 4.5  Bicarb 16.6  7.33/34/115  Wbc 10   hgb 9  plts 184    CXR: 1/12: reviewed; bibasilar infiltrates, et tube acceptable    Micro: reviewed    A/P:  1. Acute hypoxemic respiratory failure -- oxygenation not bad, daily sbt/weaning trials. --mental status not great at the moment, may preclude extubation  2. Alcohol withdrawal, abuse and dependence with delerium tremens -- no longer needing prn benzos  3. Aspiration pneumonia -- antibitoics per ID  4. S/p code blue arrest -- sounds like was primarily a respiratory arrest due to inability to protect airway  5. HEIDI -- nephrology following  6. Hypernatremia \"  \"  7. Megaloblastic anemia  8. Bacteremia  9. Acidemia    Ongoing goals of care discussions as per Dr. Ma.  Mental status precludes extubation at the moment, had apnea on PS just now.     CC 35 mins.   "

## 2022-01-12 NOTE — CASE MANAGEMENT/SOCIAL WORK
Discharge Planning Assessment   Adrien     Patient Name: Jose Armando Reich  MRN: [id number]  Today's Date: 1/12/2022    Admit Date: 1/4/2022     Discharge Plan     Row Name 01/12/22 1248       Plan    Plan Pt admitted 1/4/22. Pt remains intubated and off sedation. Pt was discussed with rounding team on this date, ethics meeting may be held on pt. Pt is still unresponsive off sedation and father is hesitant to make decisions. Pt is from home and does not utilize home health services or DME. Discharge plan pending improvement. SS to continue to follow and asisst.              WILLIAM Tyson

## 2022-01-12 NOTE — PROGRESS NOTES
Nephrology Progress Note      Subjective     Pt is off sedation, no improvement in metal status, minimally responsive.   Remains on vent.   Objective       Vital signs :     Temp:  [97.4 °F (36.3 °C)-97.8 °F (36.6 °C)] 97.7 °F (36.5 °C)  Heart Rate:  [78-93] 85  Resp:  [18-20] 18  BP: ()/(59-88) 127/72  FiO2 (%):  [30 %] 30 %      Intake/Output Summary (Last 24 hours) at 1/12/2022 0743  Last data filed at 1/12/2022 0548  Gross per 24 hour   Intake 2586 ml   Output 400 ml   Net 2186 ml       Physical Exam:    General Appearance : intubated sedated  Lungs : clear to auscultation, respirations regular  Heart :  regular rhythm & normal rate, normal S1, S2 and no murmur, no rub  Abdomen : normal bowel sounds, no masses, no hepatomegaly, no splenomegaly, soft non-tender and no guarding  Extremities : no edema, no cyanosis and no redness  Neurologic :   Sedated,  Unable to  Assess.          Laboratory Data :     Albumin No results found for: ALBUMIN   Magnesium Magnesium   Date Value Ref Range Status   01/12/2022 2.1 1.6 - 2.6 mg/dL Final   01/10/2022 2.1 1.6 - 2.6 mg/dL Final   01/10/2022 2.1 1.6 - 2.6 mg/dL Final          PTH               No results found for: PTH    CBC and coagulation:  Results from last 7 days   Lab Units 01/12/22  0020 01/10/22  2354 01/10/22  0032 01/08/22  0038 01/07/22  0035   CRP mg/dL  --  25.26*  --   --  44.76*   WBC 10*3/mm3 10.08 12.88* 10.70   < > 8.46   HEMOGLOBIN g/dL 9.0* 9.3* 8.6*   < > 10.0*   HEMATOCRIT % 29.2* 31.0* 28.1*   < > 31.4*   MCV fL 101.4* 103.3* 102.6*   < > 98.7*   MCHC g/dL 30.8* 30.0* 30.6*   < > 31.8   PLATELETS 10*3/mm3 184 176 148   < > 108*    < > = values in this interval not displayed.     Acid/base balance:  Results from last 7 days   Lab Units 01/12/22  0436 01/11/22  0501 01/10/22  0501   PH, ARTERIAL pH units 7.335* 7.240* 7.300*   PO2 ART mm Hg 115.0* 134.0* 86.3   PCO2, ARTERIAL mm Hg 34.0* 39.1 37.9   HCO3 ART mmol/L 18.1* 16.7* 18.6*     Renal and  electrolytes:  Results from last 7 days   Lab Units 01/12/22  0554 01/12/22  0020 01/11/22  1748 01/11/22  1140 01/11/22  1140 01/11/22  0657 01/11/22  0657 01/10/22  2354 01/10/22  2354 01/10/22  0551 01/10/22  0031   SODIUM mmol/L 142 142 146*  --  143  --  146*   < > 147*   < > 151*   POTASSIUM mmol/L 4.5 4.7 5.0   < > 4.8   < > 4.9   < > 4.9   < > 4.4   MAGNESIUM mg/dL  --  2.1  --   --   --   --   --   --  2.1  --  2.1   CHLORIDE mmol/L 112* 113* 119*   < > 115*   < > 118*   < > 119*   < > 123*   CO2 mmol/L 16.6* 16.1* 15.7*   < > 14.4*   < > 15.5*   < > 14.3*   < > 17.8*   BUN mg/dL 78* 73* 70*   < > 67*   < > 65*   < > 59*   < > 47*   CREATININE mg/dL 6.80* 6.36* 5.92*  --  6.09*  --  5.92*   < > 5.64*   < > 4.41*   EGFR IF NONAFRICN AM mL/min/1.73 8* 9* 10*   < > 10*   < > 10*   < > 10*   < > 14*   CALCIUM mg/dL 7.3* 7.1* 7.1*   < > 7.0*   < > 7.0*   < > 6.8*   < > 6.8*   PHOSPHORUS mg/dL  --  5.4*  --   --   --   --   --   --  5.1*  --  3.2    < > = values in this interval not displayed.     Estimated Creatinine Clearance: 13.4 mL/min (A) (by C-G formula based on SCr of 6.8 mg/dL (H)).    Liver and pancreatic function:  Results from last 7 days   Lab Units 01/10/22  2354   AMMONIA umol/L 28         Cardiac:      Liver and pancreatic function:  Results from last 7 days   Lab Units 01/10/22  2354   AMMONIA umol/L 28       Medications :     bisacodyl, 10 mg, Rectal, Daily  cefTRIAXone, 2 g, Intravenous, Q24H  chlorhexidine, 15 mL, Mouth/Throat, Q12H  enoxaparin, 40 mg, Subcutaneous, Q24H  folic acid, 1 mg, Oral, Daily  insulin aspart, 0-7 Units, Subcutaneous, Q6H  insulin detemir, 15 Units, Subcutaneous, Daily  metroNIDAZOLE, 500 mg, Intravenous, Q8H  midodrine, 10 mg, Oral, TID AC  multivitamin, 1 tablet, Per G Tube, Daily  pantoprazole, 40 mg, Intravenous, Q AM  polyethylene glycol, 17 g, Oral, Daily  sodium chloride, 10 mL, Intravenous, Q12H  sodium chloride, 10 mL, Intravenous, Q12H  thiamine, 100 mg,  Oral, Daily  Vancomycin Pharmacy Intermittent Dosing, , Does not apply, Daily      norepinephrine, 0.02-0.3 mcg/kg/min, Last Rate: Stopped (01/11/22 1520)  Pharmacy Consult - Pharmacy to dose,   Pharmacy Consult - Pharmacy to dose,   propofol, 5-50 mcg/kg/min, Last Rate: Stopped (01/11/22 0998)          Assessment/Plan     1. HEIDI, ATN, baseline Cr around 1.2  2. Hypernatremia  3. Metabolic acidosis  4. Metabolic encephalopathy with Etoh withdrawal  4. Sepsis with bacteremia    Worsening oliguric ATN, father talked to Dr. Ma, he does not want dialysis and is ongoing goals of care discussion for comfort measures.   Continue conservative measure meanwhile, hypernatremia has improved so will continue on free water with TF and DC D5W      Negrito Solomon MD  01/12/22  07:43 EST

## 2022-01-12 NOTE — PROGRESS NOTES
PROGRESS NOTE         Patient Identification:  Name:  Jose Armando Reich  Age:  56 y.o.  Sex:  male  :  1965  MRN:  [id number]  Visit Number:  [id number]  Primary Care Provider:  Provider, No Known         LOS: 7 days       ----------------------------------------------------------------------------------------------------------------------  Subjective       Chief Complaints:    Fall        Interval History:      Patient remains intubated at 30% FiO2 today.  WBC normal.  Blood cultures from 1/10/2022 show no growth thus far.  Chest x-ray from 2022 reports minimal bibasilar airspace disease, little overall change.    Review of Systems:    Unable to obtain.  Intubated and sedated  ----------------------------------------------------------------------------------------------------------------------      Objective       Current Hospital Meds:  bisacodyl, 10 mg, Rectal, Daily  cefTRIAXone, 2 g, Intravenous, Q24H  chlorhexidine, 15 mL, Mouth/Throat, Q12H  enoxaparin, 40 mg, Subcutaneous, Q24H  folic acid, 1 mg, Oral, Daily  insulin aspart, 0-7 Units, Subcutaneous, Q6H  insulin detemir, 15 Units, Subcutaneous, Daily  metroNIDAZOLE, 500 mg, Intravenous, Q8H  midodrine, 10 mg, Oral, TID AC  multivitamin, 1 tablet, Per G Tube, Daily  pantoprazole, 40 mg, Intravenous, Q AM  polyethylene glycol, 17 g, Oral, Daily  sodium chloride, 10 mL, Intravenous, Q12H  sodium chloride, 10 mL, Intravenous, Q12H  thiamine, 100 mg, Oral, Daily  Vancomycin Pharmacy Intermittent Dosing, , Does not apply, Daily      norepinephrine, 0.02-0.3 mcg/kg/min, Last Rate: Stopped (22 1520)  Pharmacy Consult - Pharmacy to dose,   Pharmacy Consult - Pharmacy to dose,   propofol, 5-50 mcg/kg/min, Last Rate: Stopped (22 0923)      ----------------------------------------------------------------------------------------------------------------------    Vital Signs:  Temp:  [96.8 °F (36 °C)-97.8 °F (36.6 °C)] 96.8 °F (36  °C)  Heart Rate:  [80-93] 89  Resp:  [16-20] 16  BP: (107-148)/(64-89) 148/89  FiO2 (%):  [30 %] 30 %  Mean Arterial Pressure (Non-Invasive) for the past 24 hrs (Last 3 readings):   Noninvasive MAP (mmHg)   01/12/22 0905 96   01/12/22 0805 99   01/12/22 0700 91     SpO2 Percentage    01/12/22 0805 01/12/22 0905 01/12/22 1008   SpO2: 100% 99% 100%     SpO2:  [98 %-100 %] 100 %  on  Flow (L/min):  [15] 15;   Device (Oxygen Therapy): ventilator    Body mass index is 22.02 kg/m².  Wt Readings from Last 3 Encounters:   01/12/22 79.9 kg (176 lb 3.2 oz)        Intake/Output Summary (Last 24 hours) at 1/12/2022 1132  Last data filed at 1/12/2022 0548  Gross per 24 hour   Intake 2556 ml   Output 400 ml   Net 2156 ml     NPO Diet  ----------------------------------------------------------------------------------------------------------------------      Physical Exam:    Constitutional:   Intubated and sedated.    HENT:  Head: Normocephalic and atraumatic.  Mouth:  Moist mucous membranes.    Eyes:  Conjunctivae and EOM are normal.  No scleral icterus.  Neck:  Neck supple.  No JVD present.    Cardiovascular:  Normal rate, regular rhythm and normal heart sounds with no murmur. No edema.  Pulmonary/Chest: Intubated and sedated.  Diminished lung sounds bilaterally.  Abdominal:  Soft.  Bowel sounds are normal.  No distension and no tenderness.   Musculoskeletal:  No edema, no tenderness, and no deformity.  No swelling or redness of joints.  Neurological:  Intubated and sedated.  Skin:  Skin is warm and dry.  RLE erythema and warmth, but improving. Scattered ulcers and abrasions.   Psychiatric:  Intubated and sedated.    ----------------------------------------------------------------------------------------------------------------------  Results from last 7 days   Lab Units 01/08/22  0038 01/06/22  0124   CK TOTAL U/L 131 548*         Results from last 7 days   Lab Units 01/12/22  0436   PH, ARTERIAL pH units 7.335*   PO2 ART mm Hg  115.0*   PCO2, ARTERIAL mm Hg 34.0*   HCO3 ART mmol/L 18.1*     Results from last 7 days   Lab Units 01/12/22  0020 01/10/22  2354 01/10/22  0032 01/08/22  0038 01/07/22  0035   CRP mg/dL  --  25.26*  --   --  44.76*   WBC 10*3/mm3 10.08 12.88* 10.70   < > 8.46   HEMOGLOBIN g/dL 9.0* 9.3* 8.6*   < > 10.0*   HEMATOCRIT % 29.2* 31.0* 28.1*   < > 31.4*   MCV fL 101.4* 103.3* 102.6*   < > 98.7*   MCHC g/dL 30.8* 30.0* 30.6*   < > 31.8   PLATELETS 10*3/mm3 184 176 148   < > 108*    < > = values in this interval not displayed.     Results from last 7 days   Lab Units 01/12/22  0554 01/12/22  0020 01/11/22  1748 01/11/22  0657 01/10/22  2354 01/10/22  0551 01/10/22  0031   SODIUM mmol/L 142 142 146*   < > 147*   < > 151*   POTASSIUM mmol/L 4.5 4.7 5.0   < > 4.9   < > 4.4   MAGNESIUM mg/dL  --  2.1  --   --  2.1  --  2.1   CHLORIDE mmol/L 112* 113* 119*   < > 119*   < > 123*   CO2 mmol/L 16.6* 16.1* 15.7*   < > 14.3*   < > 17.8*   BUN mg/dL 78* 73* 70*   < > 59*   < > 47*   CREATININE mg/dL 6.80* 6.36* 5.92*   < > 5.64*   < > 4.41*   EGFR IF NONAFRICN AM mL/min/1.73 8* 9* 10*   < > 10*   < > 14*   CALCIUM mg/dL 7.3* 7.1* 7.1*   < > 6.8*   < > 6.8*   GLUCOSE mg/dL 337* 313* 223*   < > 138*   < > 118*    < > = values in this interval not displayed.   Estimated Creatinine Clearance: 13.4 mL/min (A) (by C-G formula based on SCr of 6.8 mg/dL (H)).  Ammonia   Date Value Ref Range Status   01/10/2022 28 16 - 60 umol/L Final       Glucose   Date/Time Value Ref Range Status   01/12/2022 1102 234 (H) 70 - 130 mg/dL Final     Comment:     Meter: [id number] : [id number] Adalberto Mason   01/12/2022 0537 334 (H) 70 - 130 mg/dL Final     Comment:     Meter: [id number] : [id number] BRENT ALDOSTNICOLE   01/12/2022 0002 310 (H) 70 - 130 mg/dL Final     Comment:     Meter: [id number] : [id number] BRENT BRANSTUTTER   01/11/2022 1738 208 (H) 70 - 130 mg/dL Final     Comment:     Meter: [id number] : [id number] Adalberto Mason    01/11/2022 1332 185 (H) 70 - 130 mg/dL Final     Comment:     Meter: [id number] : [id number] Adalberto Mason   01/11/2022 0508 195 (H) 70 - 130 mg/dL Final     Comment:     Meter: [id number] : [id number] BRENT ANTONIO   01/11/2022 0114 149 (H) 70 - 130 mg/dL Final     Comment:     Meter: [id number] : [id number] BRENT BRANSTUTTER   01/10/2022 1716 127 70 - 130 mg/dL Final     Comment:     Meter: [id number] : [id number] Roberto WHITE     Lab Results   Component Value Date    HGBA1C 7.60 (H) 01/04/2022     No results found for: TSH, FREET4    Blood Culture   Date Value Ref Range Status   01/07/2022 No growth at 24 hours  Preliminary   01/04/2022 Proteus mirabilis (C)  Final   01/04/2022 Streptococcus dysgalactiae ssp equisimilis (C)  Final   01/04/2022 Enterococcus faecalis (C)  Final     Comment:     Infectious disease consultation is highly recommended.   01/04/2022 Gram Negative Bacilli (C)  Final   01/04/2022 Streptococcus dysgalactiae ssp equisimilis (C)  Final   01/04/2022 Enterococcus faecalis (C)  Final     Comment:     Infectious disease consultation is highly recommended.   01/04/2022 Alcaligenes faecalis ssp faecalis (C)  Final   01/04/2022 Staphylococcus, coagulase negative (C)  Final     Comment:     Probable contaminant requires clinical correlation, susceptibility not performed unless requested by physician.       No results found for: URINECX  No results found for: WOUNDCX  No results found for: STOOLCX  No results found for: RESPCX  Pain Management Panel       Pain Management Panel Latest Ref Rng & Units 1/4/2022    AMPHETAMINES SCREEN, URINE Negative Negative    BARBITURATES SCREEN Negative Negative    BENZODIAZEPINE SCREEN, URINE Negative Negative    BUPRENORPHINEUR Negative Negative    COCAINE SCREEN, URINE Negative Negative    METHADONE SCREEN, URINE Negative Negative    METHAMPHETAMINEUR Negative Negative               ----------------------------------------------------------------------------------------------------------------------  Imaging Results (Last 24 Hours)       Procedure Component Value Units Date/Time    XR Chest 1 View [[id number]] Collected: 01/12/22 0757     Updated: 01/12/22 0759    Narrative:      XR CHEST 1 VW-     CLINICAL INDICATION: intubated; S00.83XA-Contusion of other part of  head, initial encounter; F10.231-Alcohol dependence with withdrawal  delirium        COMPARISON: 01/11/2022      TECHNIQUE: Single frontal view of the chest.     FINDINGS:     Minimal bibasilar airspace disease  No interval line change  There is no evidence of an acute osseous abnormality.   There are no suspicious-appearing parenchymal soft tissue nodules.          Impression:      Little overall change     This report was finalized on 1/12/2022 7:57 AM by Dr. Jose Armando Zhu MD.               ----------------------------------------------------------------------------------------------------------------------    Assessment/Plan       Assessment/Plan     ASSESSMENT:    1. Severe sepsis with lactic acid greater than 2 on admission  2. Bacteremia  3. Cellulitis right lower extremitiy    PLAN:    Patient remains intubated at 30% FiO2 today.  WBC normal.  Blood cultures from 1/10/2022 show no growth thus far.  Chest x-ray from 1/12/2022 reports minimal bibasilar airspace disease, little overall change.    Status post bronchoscopy on 1/10/2022.  Respiratory culture from 1/10/2022 currently in process.     Blood culture from 1/7/2022 reports growth of beta-hemolytic group G Streptococcus.    Blood cultures from 1/4/2022 finalized as 1 out of 2 sets positive for Streptococcus dysgalactiae, Enterococcus faecalis, Alcaligenes faecalis, coagulase-negative Staphylococcus and noted 2 sets positive for Proteus mirabilis, Streptococcus dysgalactiae, Enterococcus faecalis.     MRSA PCR negative. COVID-19 and influenza PCR negative. Lactic  2.2 on admission. Urinalysis unremarkable for infection.CT of the abdomen and pelvis from 1/5/22 reports diffuse fatty change of the liver. CT Chest from 1/5/22 unremarkable. CT of the facial bones from 1/4/22 reports significant periapical lucency seen on the lower left and the mandible. CT of the head from 1/4/22 reports significant periapical lucency seen on the lower left and the mandible.    2D echo from 1/7/2022 reports no evidence of vegetation.    On 1/8/22 patient experienced aspiration event this afternoon and rapid response was called, patient became pulseless and CODE BLUE was called, ROSC was obtained.  Patient was intubated.      Recommend to continue Rocephin, vancomycin, Flagyl for now. We will continue to follow closely and adjust antibiotic therapy as needed.    Code Status:   Code Status and Medical Interventions:   Ordered at: 01/10/22 1439     Medical Intervention Limits:    NO dialysis     Level Of Support Discussed With:    Next of Kin (If No Surrogate)     Code Status (Patient has no pulse and is not breathing):    CPR (Attempt to Resuscitate)     Medical Interventions (Patient has pulse or is breathing):    Limited Support       Prerna Babin, ANI  01/12/22  11:32 EST

## 2022-01-12 NOTE — PLAN OF CARE
Problem: Adult Inpatient Plan of Care  Goal: Plan of Care Review  Outcome: Ongoing, Progressing   Pt remains int. Sedation off. Pt arousing to voice; withdraws from pain. Unable to follow any commands at this time. TF infusing at 50. Tolerating well. Remains SR. VSS off levo. BG monitored. WCTM

## 2022-01-12 NOTE — PROGRESS NOTES
[facility] HOSPITALIST PROGRESS NOTE     Patient Identification:  Name:  Jose Armando Reich  Age:  56 y.o.  Sex:  male  :  1965  MRN:  [id number]  Visit Number:  [id number]  Primary Care Provider:  Provider, No Known    Length of stay:  7    Chief complaint: Intubated and sedated    Subjective:    Patient remains intubated and sedated and is relatively unchanged compared to yesterday.  Patient requiring minimal ventilatory assistance with mechanical ventilator.  However, after 24 hours of sedation vacation, patient cannot follow commands.  Patient will open eyes to voice but will not track.  Had lengthy conversation with patient's father and brother today regarding overall prognosis.  It is this practitioner's opinion that patient will eventually require hemodialysis.  However, after lengthy discussion both the patient's father and brother state they think the patient would not want to proceed with hemodialysis.  I did inform them that this would likely ultimately lead to his death.  Both the patient's father and brother expressed their understanding and stated they still believe this is what the patient would want.  Otherwise, no new events overnight.  ----------------------------------------------------------------------------------------------------------------------  Current Hospital Meds:  bisacodyl, 10 mg, Rectal, Daily  cefTRIAXone, 2 g, Intravenous, Q24H  chlorhexidine, 15 mL, Mouth/Throat, Q12H  enoxaparin, 40 mg, Subcutaneous, Q24H  folic acid, 1 mg, Oral, Daily  insulin aspart, 0-7 Units, Subcutaneous, Q6H  insulin detemir, 15 Units, Subcutaneous, Daily  metroNIDAZOLE, 500 mg, Intravenous, Q8H  midodrine, 10 mg, Oral, TID AC  multivitamin, 1 tablet, Per G Tube, Daily  pantoprazole, 40 mg, Intravenous, Q AM  polyethylene glycol, 17 g, Oral, Daily  sodium chloride, 10 mL, Intravenous, Q12H  sodium chloride, 10 mL, Intravenous, Q12H  thiamine, 100 mg, Oral, Daily  Vancomycin Pharmacy  Intermittent Dosing, , Does not apply, Daily      norepinephrine, 0.02-0.3 mcg/kg/min, Last Rate: Stopped (01/11/22 1520)  Pharmacy Consult - Pharmacy to dose,   Pharmacy Consult - Pharmacy to dose,   propofol, 5-50 mcg/kg/min, Last Rate: Stopped (01/11/22 0923)      ----------------------------------------------------------------------------------------------------------------------  Vital Signs:  Temp:  [96.8 °F (36 °C)-98.3 °F (36.8 °C)] 98.3 °F (36.8 °C)  Heart Rate:  [80-93] 83  Resp:  [16-24] 22  BP: (117-169)/(67-97) 157/94  FiO2 (%):  [30 %] 30 %      01/10/22  0500 01/11/22  0526 01/12/22  0542   Weight: 73.8 kg (162 lb 12.8 oz) 77.5 kg (170 lb 12.8 oz) 79.9 kg (176 lb 3.2 oz)     Body mass index is 22.02 kg/m².    Intake/Output Summary (Last 24 hours) at 1/12/2022 1731  Last data filed at 1/12/2022 1421  Gross per 24 hour   Intake 2656 ml   Output 400 ml   Net 2256 ml     NPO Diet  ----------------------------------------------------------------------------------------------------------------------  Physical exam:  Constitutional: Acutely ill-appearing  male, currently intubated and sedated.     HENT:  Head:  Normocephalic and atraumatic.  Mouth:  Moist mucous membranes.    Eyes:  Conjunctivae are normal.   No scleral icterus.    Neck:  Neck supple. No thyromegaly.  No JVD present.    Cardiovascular:  Regular rate and rhythm with no murmurs, rubs, clicks or gallops appreciated.  Pulmonary/Chest:  Clear to auscultation bilaterally with no crackles, wheezes or rhonchi appreciated.  Abdominal:  Soft. Nontender. Nondistended  Bowel sounds are normal in all four quadrants. No organomegally appreciated.   Musculoskeletal:  No edema, no tenderness, and no deformity.  No red or swollen joints anywhere.    Neurological: Intubated and sedated.   Skin:  Warm and dry to palpation with no rashes or lesions appreciated.  Peripheral vascular:  2+ radial and pedal pulses in bilateral upper and lower  extremities.    ------------------------------------------------------------------------------    ----------------------------------------------------------------------------------------------------------------------  Results from last 7 days   Lab Units 01/08/22  0038 01/06/22  0124   CK TOTAL U/L 131 548*     Results from last 7 days   Lab Units 01/12/22  0020 01/10/22  2354 01/10/22  0032 01/08/22  0038 01/07/22  0035   CRP mg/dL  --  25.26*  --   --  44.76*   WBC 10*3/mm3 10.08 12.88* 10.70   < > 8.46   HEMOGLOBIN g/dL 9.0* 9.3* 8.6*   < > 10.0*   HEMATOCRIT % 29.2* 31.0* 28.1*   < > 31.4*   MCV fL 101.4* 103.3* 102.6*   < > 98.7*   MCHC g/dL 30.8* 30.0* 30.6*   < > 31.8   PLATELETS 10*3/mm3 184 176 148   < > 108*    < > = values in this interval not displayed.     Results from last 7 days   Lab Units 01/12/22  0436   PH, ARTERIAL pH units 7.335*   PO2 ART mm Hg 115.0*   PCO2, ARTERIAL mm Hg 34.0*   HCO3 ART mmol/L 18.1*     Results from last 7 days   Lab Units 01/12/22  1134 01/12/22  0554 01/12/22  0020 01/11/22  0657 01/10/22  2354 01/10/22  0551 01/10/22  0031   SODIUM mmol/L 145 142 142   < > 147*   < > 151*   POTASSIUM mmol/L 4.6 4.5 4.7   < > 4.9   < > 4.4   MAGNESIUM mg/dL  --   --  2.1  --  2.1  --  2.1   CHLORIDE mmol/L 115* 112* 113*   < > 119*   < > 123*   CO2 mmol/L 16.7* 16.6* 16.1*   < > 14.3*   < > 17.8*   BUN mg/dL 80* 78* 73*   < > 59*   < > 47*   CREATININE mg/dL 6.70* 6.80* 6.36*   < > 5.64*   < > 4.41*   EGFR IF NONAFRICN AM mL/min/1.73 9* 8* 9*   < > 10*   < > 14*   CALCIUM mg/dL 7.4* 7.3* 7.1*   < > 6.8*   < > 6.8*   GLUCOSE mg/dL 265* 337* 313*   < > 138*   < > 118*    < > = values in this interval not displayed.   Estimated Creatinine Clearance: 13.6 mL/min (A) (by C-G formula based on SCr of 6.7 mg/dL (H)).    Ammonia   Date Value Ref Range Status   01/10/2022 28 16 - 60 umol/L Final         Blood Culture   Date Value Ref Range Status   01/07/2022 Streptococcus dysgalactiae ssp  equisimilis (C)  Final   01/04/2022 Proteus mirabilis (C)  Final   01/04/2022 Streptococcus dysgalactiae ssp equisimilis (C)  Final   01/04/2022 Enterococcus faecalis (C)  Final     Comment:     Infectious disease consultation is highly recommended.   01/04/2022 Gram Negative Bacilli (C)  Final   01/04/2022 Streptococcus dysgalactiae ssp equisimilis (C)  Final   01/04/2022 Enterococcus faecalis (C)  Final     Comment:     Infectious disease consultation is highly recommended.   01/04/2022 Alcaligenes faecalis ssp faecalis (C)  Final   01/04/2022 Staphylococcus, coagulase negative (C)  Final     Comment:     Probable contaminant requires clinical correlation, susceptibility not performed unless requested by physician.       No results found for: URINECX  No results found for: WOUNDCX  No results found for: STOOLCX    I have personally looked at the labs and they are summarized above.  ----------------------------------------------------------------------------------------------------------------------  Imaging Results (Last 24 Hours)     Procedure Component Value Units Date/Time    XR Chest 1 View [[id number]] Collected: 01/12/22 0757     Updated: 01/12/22 0759    Narrative:      XR CHEST 1 VW-     CLINICAL INDICATION: intubated; S00.83XA-Contusion of other part of  head, initial encounter; F10.231-Alcohol dependence with withdrawal  delirium        COMPARISON: 01/11/2022      TECHNIQUE: Single frontal view of the chest.     FINDINGS:     Minimal bibasilar airspace disease  No interval line change  There is no evidence of an acute osseous abnormality.   There are no suspicious-appearing parenchymal soft tissue nodules.          Impression:      Little overall change     This report was finalized on 1/12/2022 7:57 AM by Dr. Jose Armando Zhu MD.           ----------------------------------------------------------------------------------------------------------------------  Assessment and Plan:    1.  Acute hypoxic  respiratory failure -continue to hold sedation and once able to follow commands, will attempt spontaneous breathing trial.     2.  Alcohol withdrawal -CIWA protocol currently on hold, will start scheduled Librium once patient awakens if he continues to demonstrate signs of withdrawal.     3.  Aspiration pneumonia -continue Rocephin, Flagyl and vancomycin per infectious disease recommendations.    4.  Severe sepsis -likely secondary to aspiration pneumonia, continue Rocephin, Flagyl and vancomycin.  Repeat blood cultures are negative to date.    5.  Bacteremia -initial blood cultures dated 1/4/2020 to demonstrate Proteus mirabilis and Enterococcus faecalis bacteremia.  Appreciate infectious disease recommendations, will continue Rocephin, Flagyl and vancomycin for now.  Repeat blood cultures x2 currently pending.    6.  Hypernatremia -resolved     7.  Acute kidney injury -serum creatinine continues to worsen, patient with persistent oliguric ATN.  Patient will likely require hemodialysis in the near future.  However, patient's family have stated patient would not want to pursue hemodialysis.  After explaining this will likely transition to a life-threatening event, patient's family expressed their understanding and still insisted patient would not want to proceed on hemodialysis.  If this were to occur, I have discussed with patient's family transitioning to comfort measures.    8. acute metabolic encephalopathy -patient has been without sedation for the past 24 hours.  Patient will open eyes to verbal cues but will not track and will not follow commands.  Etiology is likely multifactorial from uremia and residual sedating medications in the setting of acute renal failure and hepatic cirrhosis.  There is also possibility of a anoxic brain injury.  If no clear improvement in the next 24 hours, will repeat head CT.    Disposition condition is critical, patient is at high risk for worsening morbidity, and will likely  require hemodialysis in the near future.        Bahman Ma, DO  01/12/22  17:31 EST

## 2022-01-12 NOTE — PLAN OF CARE
Problem: Adult Inpatient Plan of Care  Goal: Plan of Care Review  Outcome: Ongoing, Progressing  Flowsheets  Taken 1/12/2022 1736 by Isabella Glover, RN  Outcome Summary: pt tolerating vent settings.  all sedation meds.  Pt's dad and brother updated and to visit patient today.  Failed possible weaning due to apnea alarms.  Pt still having diarrhea.  Taken 1/8/2022 0334 by Marie García, RN  Plan of Care Reviewed With: patient   Goal Outcome Evaluation:              Outcome Summary: pt tolerating vent settings.  all sedation meds.  Pt's dad and brother updated and to visit patient today.  Failed possible weaning due to apnea alarms.  Pt still having diarrhea.

## 2022-01-13 NOTE — PROGRESS NOTES
Nephrology Progress Note      Subjective     Clinical status quo, no improvement. Minimal responsive on off sedation.   Objective       Vital signs :     Temp:  [96.8 °F (36 °C)-98.7 °F (37.1 °C)] 98.7 °F (37.1 °C)  Heart Rate:  [80-98] 89  Resp:  [16-24] 18  BP: (117-169)/() 127/79  FiO2 (%):  [28 %-30 %] 28 %      Intake/Output Summary (Last 24 hours) at 1/13/2022 0749  Last data filed at 1/13/2022 0635  Gross per 24 hour   Intake 1627 ml   Output 500 ml   Net 1127 ml       Physical Exam:    General Appearance : intubated sedated  Lungs : clear to auscultation, respirations regular  Heart :  regular rhythm & normal rate, normal S1, S2 and no murmur, no rub  Abdomen : normal bowel sounds, no masses, no hepatomegaly, no splenomegaly, soft non-tender and no guarding  Extremities : no edema, no cyanosis and no redness  Neurologic :   Sedated,  Unable to  Assess.          Laboratory Data :     Albumin Albumin   Date Value Ref Range Status   01/12/2022 1.14 (L) 3.50 - 5.20 g/dL Final      Magnesium Magnesium   Date Value Ref Range Status   01/12/2022 2.3 1.6 - 2.6 mg/dL Final   01/12/2022 2.1 1.6 - 2.6 mg/dL Final   01/10/2022 2.1 1.6 - 2.6 mg/dL Final          PTH               No results found for: PTH    CBC and coagulation:  Results from last 7 days   Lab Units 01/12/22  2332 01/12/22  0020 01/10/22  2354 01/08/22  0038 01/07/22  0035   CRP mg/dL  --   --  25.26*  --  44.76*   WBC 10*3/mm3 11.26* 10.08 12.88*   < > 8.46   HEMOGLOBIN g/dL 8.6* 9.0* 9.3*   < > 10.0*   HEMATOCRIT % 27.2* 29.2* 31.0*   < > 31.4*   MCV fL 97.5* 101.4* 103.3*   < > 98.7*   MCHC g/dL 31.6 30.8* 30.0*   < > 31.8   PLATELETS 10*3/mm3 224 184 176   < > 108*    < > = values in this interval not displayed.     Acid/base balance:  Results from last 7 days   Lab Units 01/13/22  0424 01/12/22  0436 01/11/22  0501   PH, ARTERIAL pH units 7.384 7.335* 7.240*   PO2 ART mm Hg 108.0 115.0* 134.0*   PCO2, ARTERIAL mm Hg 30.6* 34.0* 39.1   HCO3 ART  mmol/L 18.3* 18.1* 16.7*     Renal and electrolytes:  Results from last 7 days   Lab Units 01/13/22  0456 01/12/22  2332 01/12/22  1737 01/12/22  1134 01/12/22  1134 01/12/22  0554 01/12/22  0554 01/12/22  0020 01/12/22  0020 01/11/22  0657 01/10/22  2354   SODIUM mmol/L 145 143 143  --  145  --  142   < > 142   < > 147*   POTASSIUM mmol/L 4.5 4.4 4.5   < > 4.6   < > 4.5   < > 4.7   < > 4.9   MAGNESIUM mg/dL  --  2.3  --   --   --   --   --   --  2.1  --  2.1   CHLORIDE mmol/L 113* 113* 114*   < > 115*   < > 112*   < > 113*   < > 119*   CO2 mmol/L 17.4* 16.3* 16.2*   < > 16.7*   < > 16.6*   < > 16.1*   < > 14.3*   BUN mg/dL 89* 83* 83*   < > 80*   < > 78*   < > 73*   < > 59*   CREATININE mg/dL 7.70* 7.23* 7.05*  --  6.70*  --  6.80*   < > 6.36*   < > 5.64*   EGFR IF NONAFRICN AM mL/min/1.73 7* 8* 8*   < > 9*   < > 8*   < > 9*   < > 10*   CALCIUM mg/dL 7.9* 7.7* 7.6*   < > 7.4*   < > 7.3*   < > 7.1*   < > 6.8*   PHOSPHORUS mg/dL  --  4.6*  --   --   --   --   --   --  5.4*  --  5.1*    < > = values in this interval not displayed.     Estimated Creatinine Clearance: 11.8 mL/min (A) (by C-G formula based on SCr of 7.7 mg/dL (H)).    Liver and pancreatic function:  Results from last 7 days   Lab Units 01/12/22  2332 01/10/22  2354   ALBUMIN g/dL 1.14*  --    BILIRUBIN mg/dL 0.3  --    ALK PHOS U/L 599*  --    AST (SGOT) U/L 48*  --    ALT (SGPT) U/L 23  --    AMMONIA umol/L  --  28         Cardiac:      Liver and pancreatic function:  Results from last 7 days   Lab Units 01/12/22  2332 01/10/22  2354   ALBUMIN g/dL 1.14*  --    BILIRUBIN mg/dL 0.3  --    ALK PHOS U/L 599*  --    AST (SGOT) U/L 48*  --    ALT (SGPT) U/L 23  --    AMMONIA umol/L  --  28       Medications :     bisacodyl, 10 mg, Rectal, Daily  cefTRIAXone, 2 g, Intravenous, Q24H  chlorhexidine, 15 mL, Mouth/Throat, Q12H  enoxaparin, 40 mg, Subcutaneous, Q24H  folic acid, 1 mg, Oral, Daily  insulin aspart, 0-7 Units, Subcutaneous, Q6H  insulin detemir, 15  Units, Subcutaneous, Daily  metroNIDAZOLE, 500 mg, Intravenous, Q8H  midodrine, 10 mg, Oral, TID AC  multivitamin, 1 tablet, Per G Tube, Daily  pantoprazole, 40 mg, Intravenous, Q AM  polyethylene glycol, 17 g, Oral, Daily  sodium chloride, 10 mL, Intravenous, Q12H  sodium chloride, 10 mL, Intravenous, Q12H  thiamine, 100 mg, Oral, Daily  Vancomycin Pharmacy Intermittent Dosing, , Does not apply, Daily      norepinephrine, 0.02-0.3 mcg/kg/min, Last Rate: Stopped (01/11/22 1520)  Pharmacy Consult - Pharmacy to dose,   Pharmacy Consult - Pharmacy to dose,   propofol, 5-50 mcg/kg/min, Last Rate: Stopped (01/11/22 9268)          Assessment/Plan     1. HEIDI, ATN, baseline Cr around 1.2  2. Hypernatremia  3. Metabolic acidosis  4. Metabolic encephalopathy with Etoh withdrawal  4. Sepsis with bacteremia    No improvement in overall status, continue to be olliguric with worsening renal functions. Hypernatremia is  Stable on free water replacement with TF. As stated, family has declined for dialysis, would recommend code status and goals of care discussion.    Continue conservative measure meanwhile, hypernatremia has improved so will continue on free water with TF and DC D5W      Negrito Solomon MD  01/13/22  07:49 EST

## 2022-01-13 NOTE — PROGRESS NOTES
PROGRESS NOTE         Patient Identification:  Name:  Jose Armando Reich  Age:  56 y.o.  Sex:  male  :  1965  MRN:  [id number]  Visit Number:  [id number]  Primary Care Provider:  Provider, No Known         LOS: 8 days       ----------------------------------------------------------------------------------------------------------------------  Subjective       Chief Complaints:    Fall        Interval History:      Patient remains intubated at 28% FiO2 today.  Nurse reports no issues overnight.  Afebrile, no diarrhea.  No new labs this morning.  Chest x-ray on 2022 shows bibasilar airspace disease.  Blood cultures on 1/10/2022 are so far showing no growth.    Review of Systems:    Unable to obtain.  Intubated and sedated  ----------------------------------------------------------------------------------------------------------------------      Objective       [facility] Meds:  bisacodyl, 10 mg, Rectal, Daily  cefTRIAXone, 2 g, Intravenous, Q24H  chlorhexidine, 15 mL, Mouth/Throat, Q12H  enoxaparin, 40 mg, Subcutaneous, Q24H  folic acid, 1 mg, Oral, Daily  insulin aspart, 0-7 Units, Subcutaneous, Q6H  insulin detemir, 15 Units, Subcutaneous, Daily  metroNIDAZOLE, 500 mg, Intravenous, Q8H  multivitamin, 1 tablet, Per G Tube, Daily  pantoprazole, 40 mg, Intravenous, Q AM  polyethylene glycol, 17 g, Oral, Daily  sodium chloride, 10 mL, Intravenous, Q12H  sodium chloride, 10 mL, Intravenous, Q12H  thiamine, 100 mg, Oral, Daily  Vancomycin Pharmacy Intermittent Dosing, , Does not apply, Daily      Pharmacy Consult - Pharmacy to dose,   Pharmacy Consult - Pharmacy to dose,   propofol, 5-50 mcg/kg/min, Last Rate: Stopped (22)      ----------------------------------------------------------------------------------------------------------------------    Vital Signs:  Temp:  [97.4 °F (36.3 °C)-98.7 °F (37.1 °C)] 97.6 °F (36.4 °C)  Heart Rate:  [81-98] 87  Resp:  [16-24] 16  BP:  (117-169)/() 143/80  FiO2 (%):  [28 %-30 %] 28 %  Mean Arterial Pressure (Non-Invasive) for the past 24 hrs (Last 3 readings):   Noninvasive MAP (mmHg)   01/13/22 0932 104   01/13/22 0902 107   01/13/22 0832 102     SpO2 Percentage    01/13/22 0902 01/13/22 0932 01/13/22 1011   SpO2: 97% 98% 99%     SpO2:  [97 %-100 %] 99 %  on  Flow (L/min):  [15] 15;   Device (Oxygen Therapy): ventilator    Body mass index is 22.02 kg/m².  Wt Readings from Last 3 Encounters:   01/13/22 79.9 kg (176 lb 3.2 oz)        Intake/Output Summary (Last 24 hours) at 1/13/2022 1043  Last data filed at 1/13/2022 0635  Gross per 24 hour   Intake 1627 ml   Output 500 ml   Net 1127 ml     NPO Diet  ----------------------------------------------------------------------------------------------------------------------      Physical Exam:    Constitutional: Chronically ill-appearing  male is intubated.  HENT:  Head: Normocephalic and atraumatic.  Mouth:  Moist mucous membranes.    Eyes:  Conjunctivae and EOM are normal.  No scleral icterus.  Neck:  Neck supple.  No JVD present.    Cardiovascular:  Normal rate, regular rhythm and normal heart sounds with no murmur. No edema.  Pulmonary/Chest: Intubated.  Lungs clear to auscultation bilaterally.  No crackles, no rhonchi, and no wheezes.  Abdominal:  Soft.  Bowel sounds are normal.  No distension and no tenderness.   Musculoskeletal:  No edema, no tenderness, and no deformity.  No swelling or redness of joints.  Neurological: Off sedation, but not responsive.  Skin:  Skin is warm and dry.  RLE erythema and warmth, but improving. Scattered ulcers and abrasions.   Psychiatric: Off sedation, but not responsive.    ----------------------------------------------------------------------------------------------------------------------  Results from last 7 days   Lab Units 01/08/22  0038   CK TOTAL U/L 131         Results from last 7 days   Lab Units 01/13/22  0424   PH, ARTERIAL pH units 7.384    PO2 ART mm Hg 108.0   PCO2, ARTERIAL mm Hg 30.6*   HCO3 ART mmol/L 18.3*     Results from last 7 days   Lab Units 01/12/22  2332 01/12/22  0020 01/10/22  2354 01/08/22  0038 01/07/22  0035   CRP mg/dL  --   --  25.26*  --  44.76*   WBC 10*3/mm3 11.26* 10.08 12.88*   < > 8.46   HEMOGLOBIN g/dL 8.6* 9.0* 9.3*   < > 10.0*   HEMATOCRIT % 27.2* 29.2* 31.0*   < > 31.4*   MCV fL 97.5* 101.4* 103.3*   < > 98.7*   MCHC g/dL 31.6 30.8* 30.0*   < > 31.8   PLATELETS 10*3/mm3 224 184 176   < > 108*    < > = values in this interval not displayed.     Results from last 7 days   Lab Units 01/13/22  0456 01/12/22  2332 01/12/22  1737 01/12/22  0554 01/12/22  0020 01/11/22  0657 01/10/22  2354   SODIUM mmol/L 145 143 143   < > 142   < > 147*   POTASSIUM mmol/L 4.5 4.4 4.5   < > 4.7   < > 4.9   MAGNESIUM mg/dL  --  2.3  --   --  2.1  --  2.1   CHLORIDE mmol/L 113* 113* 114*   < > 113*   < > 119*   CO2 mmol/L 17.4* 16.3* 16.2*   < > 16.1*   < > 14.3*   BUN mg/dL 89* 83* 83*   < > 73*   < > 59*   CREATININE mg/dL 7.70* 7.23* 7.05*   < > 6.36*   < > 5.64*   EGFR IF NONAFRICN AM mL/min/1.73 7* 8* 8*   < > 9*   < > 10*   CALCIUM mg/dL 7.9* 7.7* 7.6*   < > 7.1*   < > 6.8*   GLUCOSE mg/dL 107* 200* 257*   < > 313*   < > 138*   ALBUMIN g/dL  --  1.14*  --   --   --   --   --    BILIRUBIN mg/dL  --  0.3  --   --   --   --   --    ALK PHOS U/L  --  599*  --   --   --   --   --    AST (SGOT) U/L  --  48*  --   --   --   --   --    ALT (SGPT) U/L  --  23  --   --   --   --   --     < > = values in this interval not displayed.   Estimated Creatinine Clearance: 11.8 mL/min (A) (by C-G formula based on SCr of 7.7 mg/dL (H)).  Ammonia   Date Value Ref Range Status   01/10/2022 28 16 - 60 umol/L Final       Glucose   Date/Time Value Ref Range Status   01/13/2022 0621 103 70 - 130 mg/dL Final     Comment:     Meter: [id number] : [id number] MARITZA AGUIRRE   01/12/2022 2311 202 (H) 70 - 130 mg/dL Final     Comment:     Meter: [id number] :  [id number] MARITZA AGUIRRE   01/12/2022 1755 248 (H) 70 - 130 mg/dL Final     Comment:     Meter: [id number] : [id number] Adalberto Mason   01/12/2022 1102 234 (H) 70 - 130 mg/dL Final     Comment:     Meter: [id number] : [id number] Adalberto Isabella   01/12/2022 0537 334 (H) 70 - 130 mg/dL Final     Comment:     Meter: [id number] : [id number] BRENT BRANSTUTTER   01/12/2022 0002 310 (H) 70 - 130 mg/dL Final     Comment:     Meter: [id number] : [id number] BRENT BRANSTUTTER   01/11/2022 1738 208 (H) 70 - 130 mg/dL Final     Comment:     Meter: [id number] : [id number] Adalberto Hoskinsothy   01/11/2022 1332 185 (H) 70 - 130 mg/dL Final     Comment:     Meter: [id number] : [id number] Adalberto Hoskinsothy     Lab Results   Component Value Date    HGBA1C 7.60 (H) 01/04/2022     No results found for: TSH, FREET4    Blood Culture   Date Value Ref Range Status   01/07/2022 No growth at 24 hours  Preliminary   01/04/2022 Proteus mirabilis (C)  Final   01/04/2022 Streptococcus dysgalactiae ssp equisimilis (C)  Final   01/04/2022 Enterococcus faecalis (C)  Final     Comment:     Infectious disease consultation is highly recommended.   01/04/2022 Gram Negative Bacilli (C)  Final   01/04/2022 Streptococcus dysgalactiae ssp equisimilis (C)  Final   01/04/2022 Enterococcus faecalis (C)  Final     Comment:     Infectious disease consultation is highly recommended.   01/04/2022 Alcaligenes faecalis ssp faecalis (C)  Final   01/04/2022 Staphylococcus, coagulase negative (C)  Final     Comment:     Probable contaminant requires clinical correlation, susceptibility not performed unless requested by physician.       No results found for: URINECX  No results found for: WOUNDCX  No results found for: STOOLCX  No results found for: RESPCX  Pain Management Panel       Pain Management Panel Latest Ref Rng & Units 1/4/2022    AMPHETAMINES SCREEN, URINE Negative Negative    BARBITURATES SCREEN Negative Negative    BENZODIAZEPINE SCREEN, URINE  Negative Negative    BUPRENORPHINEUR Negative Negative    COCAINE SCREEN, URINE Negative Negative    METHADONE SCREEN, URINE Negative Negative    METHAMPHETAMINEUR Negative Negative              ----------------------------------------------------------------------------------------------------------------------  Imaging Results (Last 24 Hours)       Procedure Component Value Units Date/Time    XR Chest 1 View [[id number]] Collected: 01/13/22 0753     Updated: 01/13/22 0755    Narrative:      XR CHEST 1 VW-     CLINICAL INDICATION: intubated; S00.83XA-Contusion of other part of  head, initial encounter; F10.231-Alcohol dependence with withdrawal  delirium        COMPARISON: 01/12/2022      TECHNIQUE: Single frontal view of the chest.     FINDINGS:     Bibasilar airspace disease  No interval line change  There is no evidence of an acute osseous abnormality.   There are no suspicious-appearing parenchymal soft tissue nodules.          Impression:      Bibasilar airspace disease     This report was finalized on 1/13/2022 7:53 AM by Dr. Jose Armando Zhu MD.       XR Chest 1 View [[id number]] Collected: 01/12/22 2142     Updated: 01/12/22 2144    Narrative:      CR Chest 1 Vw    INDICATION:   Check NG tube placement.     COMPARISON:    Chest 1/12/2022    FINDINGS:  Portable AP view(s) of the chest.    Enteric tube tip projects over the proximal body of the stomach. Support structures are otherwise stable.    The heart and mediastinal contours are normal. The lungs are clear. No pneumothorax or pleural effusion.       Impression:      Enteric tube tip projects over the proximal body of the stomach.    Signer Name: Bert Shore MD   Signed: 1/12/2022 9:42 PM   Workstation Name: [email or web address]    [facility]            ----------------------------------------------------------------------------------------------------------------------    Assessment/Plan       Assessment/Plan     ASSESSMENT:    1. Severe  sepsis with lactic acid greater than 2 on admission  2. Bacteremia  3. Cellulitis right lower extremitiy    PLAN:    Patient remains intubated at 28% FiO2 today.  Nurse reports no issues overnight.  Afebrile, no diarrhea.  No new labs this morning.  Chest x-ray on 1/13/2022 shows bibasilar airspace disease.  Blood cultures on 1/10/2022 are so far showing no growth.    Status post bronchoscopy on 1/10/2022.  Respiratory culture from 1/10/2022 finalized as no growth.    Blood cultures on 1/10/2022 are so far showing no growth.    Blood culture from 1/7/2022 finalized as Streptococcus dysgalactiae.   Blood cultures from 1/4/2022 finalized as 2 out of 2 sets positive for Streptococcus dysgalactiae and Enterococcus faecalis.  1 out of 2 were noted with Alcaligenes faecalis, coagulase-negative Staphylococcus, and Proteus mirabilis.    MRSA PCR negative. COVID-19 and influenza PCR negative. Lactic 2.2 on admission. Urinalysis unremarkable for infection.CT of the abdomen and pelvis from 1/5/22 reports diffuse fatty change of the liver. CT Chest from 1/5/22 unremarkable. CT of the facial bones from 1/4/22 reports significant periapical lucency seen on the lower left and the mandible. CT of the head from 1/4/22 reports significant periapical lucency seen on the lower left and the mandible.    2D echo from 1/7/2022 reports no evidence of vegetation.    On 1/8/22 patient experienced aspiration event and rapid response was called. Patient became pulseless and CODE BLUE was called, but ROSC was obtained.  Patient was intubated.      Recommend to continue Rocephin and vancomycin for now.  Flagyl was discontinued in the setting of improved respiratory status and clear lung exam.  We will continue to follow closely and adjust antibiotic therapy as needed.    Code Status:   Code Status and Medical Interventions:   Ordered at: 01/10/22 8026     Medical Intervention Limits:    NO dialysis     Level Of Support Discussed With:    Next of  Kin (If No Surrogate)     Code Status (Patient has no pulse and is not breathing):    CPR (Attempt to Resuscitate)     Medical Interventions (Patient has pulse or is breathing):    Limited Support       MEKHI Magana  01/13/22  10:43 EST

## 2022-01-13 NOTE — PROGRESS NOTES
"[facility]      Mar/chart reviewed  Follow up AHRF, pneumonia, alcohol withdrawal  Sedation off, but patient not following commands  Eyes are open, tries to follow some commands    Vital Sign Min/Max for last 24 hours  Temp  Min: 97.6 °F (36.4 °C)  Max: 98.7 °F (37.1 °C)   BP  Min: 117/72  Max: 169/97   Pulse  Min: 81  Max: 98   Resp  Min: 16  Max: 24   SpO2  Min: 97 %  Max: 100 %   Flow (L/min)  Min: 15  Max: 15   Weight  Min: 79.9 kg (176 lb 3.2 oz)  Max: 79.9 kg (176 lb 3.2 oz)   1627/500    Appears ill,  eyes open  makes eye contact and tries to follow commands  Perrl,normal sclera  mmm, no jvd, trachea midline, neck supple,  chest decreased coarse bilaterally, no crackles, no wheezes,   rrr,   soft, nt, nd +bs,  no c/c/ e  Skin warm, dry no rashes    Labs: 1/13: reviewed:  Glucose 193  Bun 91  Cr 7.56  Na 146  Bicarb 16  7.38/30/108       A/P:  1. Acute hypoxemic respiratory failure -- oxygenation not bad, daily sbt/weaning trials. --mental status not great at the moment, may preclude extubation  2. Alcohol withdrawal, abuse and dependence with delerium tremens -- no longer needing prn benzos  3. Aspiration pneumonia -- antibitoics per ID  4. S/p code blue arrest -- sounds like was primarily a respiratory arrest due to inability to protect airway  5. HEIDI -- nephrology following  6. Hypernatremia \"  \"  7. Megaloblastic anemia  8. Bacteremia  9. Acidemia    Mental status preventing extubation, but he is very close, suspect he can be extubated tomorrow,    CC 35 mins.       "

## 2022-01-13 NOTE — PROGRESS NOTES
[facility] HOSPITALIST PROGRESS NOTE     Patient Identification:  Name:  Jose Armando Reich  Age:  56 y.o.  Sex:  male  :  1965  MRN:  [id number]  Visit Number:  [id number]  Primary Care Provider:  Provider, No Known    Length of stay:  8    Chief complaint: Intubated and sedated    Subjective:    Patient is still intubated and sedated.  Patient will open eyes and intermittently track to voice.  Did meet with patient's father and brother in his room today.  Did discuss overall plan of care.  Patient with persistent renal failure with relatively unchanged serum creatinine.  Electrolytes are still stable.  No need for urgent/emergent dialysis at this time.  Did discuss obtaining CT head without contrast to rule out anoxic brain injury as patient is still slow to completely awaken after 48 hours of sedation vacation.   ----------------------------------------------------------------------------------------------------------------------  Current Hospital Meds:  bisacodyl, 10 mg, Rectal, Daily  cefTRIAXone, 2 g, Intravenous, Q24H  chlorhexidine, 15 mL, Mouth/Throat, Q12H  enoxaparin, 40 mg, Subcutaneous, Q24H  folic acid, 1 mg, Oral, Daily  insulin aspart, 0-7 Units, Subcutaneous, Q6H  insulin detemir, 15 Units, Subcutaneous, Daily  multivitamin, 1 tablet, Per G Tube, Daily  pantoprazole, 40 mg, Intravenous, Q AM  polyethylene glycol, 17 g, Oral, Daily  sodium chloride, 10 mL, Intravenous, Q12H  sodium chloride, 10 mL, Intravenous, Q12H  thiamine, 100 mg, Oral, Daily  Vancomycin Pharmacy Intermittent Dosing, , Does not apply, Daily      Pharmacy Consult - Pharmacy to dose,   Pharmacy Consult - Pharmacy to dose,   propofol, 5-50 mcg/kg/min, Last Rate: Stopped (22)      ----------------------------------------------------------------------------------------------------------------------  Vital Signs:  Temp:  [97.6 °F (36.4 °C)-98.7 °F (37.1 °C)] 98.5 °F (36.9 °C)  Heart Rate:  [81-98]  84  Resp:  [14-22] 14  BP: (117-167)/() 154/90  FiO2 (%):  [28 %-30 %] 28 %      01/11/22  0526 01/12/22  0542 01/13/22  0500   Weight: 77.5 kg (170 lb 12.8 oz) 79.9 kg (176 lb 3.2 oz) 79.9 kg (176 lb 3.2 oz)     Body mass index is 22.02 kg/m².    Intake/Output Summary (Last 24 hours) at 1/13/2022 1523  Last data filed at 1/13/2022 1433  Gross per 24 hour   Intake 1527 ml   Output 500 ml   Net 1027 ml     NPO Diet  ----------------------------------------------------------------------------------------------------------------------  Physical exam:  Constitutional: Acutely ill-appearing  male, currently intubated with no sedation, patient will intermittently open eyes and track to sound   HENT:  Head:  Normocephalic and atraumatic.  Mouth:  Moist mucous membranes.    Eyes:  Conjunctivae are normal.   No scleral icterus.    Neck:  Neck supple. No thyromegaly.  No JVD present.    Cardiovascular:  Regular rate and rhythm with no murmurs, rubs, clicks or gallops appreciated.  Pulmonary/Chest:  Clear to auscultation bilaterally with no crackles, wheezes or rhonchi appreciated.  Abdominal:  Soft. Nontender. Nondistended  Bowel sounds are normal in all four quadrants. No organomegally appreciated.   Musculoskeletal:  No edema, no tenderness, and no deformity.  No red or swollen joints anywhere.    Neurological: Intubated and sedated.   Skin:  Warm and dry to palpation with no rashes or lesions appreciated.  Peripheral vascular:  2+ radial and pedal pulses in bilateral upper and lower extremities.    ------------------------------------------------------------------------------    ----------------------------------------------------------------------------------------------------------------------  Results from last 7 days   Lab Units 01/08/22 0038   CK TOTAL U/L 131     Results from last 7 days   Lab Units 01/12/22  2332 01/12/22 0020 01/10/22  2354 01/08/22  0038 01/07/22 0035   CRP mg/dL  --   --  25.26*   --  44.76*   WBC 10*3/mm3 11.26* 10.08 12.88*   < > 8.46   HEMOGLOBIN g/dL 8.6* 9.0* 9.3*   < > 10.0*   HEMATOCRIT % 27.2* 29.2* 31.0*   < > 31.4*   MCV fL 97.5* 101.4* 103.3*   < > 98.7*   MCHC g/dL 31.6 30.8* 30.0*   < > 31.8   PLATELETS 10*3/mm3 224 184 176   < > 108*    < > = values in this interval not displayed.     Results from last 7 days   Lab Units 01/13/22  0424   PH, ARTERIAL pH units 7.384   PO2 ART mm Hg 108.0   PCO2, ARTERIAL mm Hg 30.6*   HCO3 ART mmol/L 18.3*     Results from last 7 days   Lab Units 01/13/22  1134 01/13/22  0456 01/12/22  2332 01/12/22  0554 01/12/22  0020 01/11/22  0657 01/10/22  2354   SODIUM mmol/L 146* 145 143   < > 142   < > 147*   POTASSIUM mmol/L 4.8 4.5 4.4   < > 4.7   < > 4.9   MAGNESIUM mg/dL  --   --  2.3  --  2.1  --  2.1   CHLORIDE mmol/L 116* 113* 113*   < > 113*   < > 119*   CO2 mmol/L 16.0* 17.4* 16.3*   < > 16.1*   < > 14.3*   BUN mg/dL 91* 89* 83*   < > 73*   < > 59*   CREATININE mg/dL 7.56* 7.70* 7.23*   < > 6.36*   < > 5.64*   EGFR IF NONAFRICN AM mL/min/1.73 7* 7* 8*   < > 9*   < > 10*   CALCIUM mg/dL 8.0* 7.9* 7.7*   < > 7.1*   < > 6.8*   GLUCOSE mg/dL 193* 107* 200*   < > 313*   < > 138*   ALBUMIN g/dL  --   --  1.14*  --   --   --   --    BILIRUBIN mg/dL  --   --  0.3  --   --   --   --    ALK PHOS U/L  --   --  599*  --   --   --   --    AST (SGOT) U/L  --   --  48*  --   --   --   --    ALT (SGPT) U/L  --   --  23  --   --   --   --     < > = values in this interval not displayed.   Estimated Creatinine Clearance: 12 mL/min (A) (by C-G formula based on SCr of 7.56 mg/dL (H)).    Ammonia   Date Value Ref Range Status   01/10/2022 28 16 - 60 umol/L Final         Blood Culture   Date Value Ref Range Status   01/07/2022 Streptococcus dysgalactiae ssp equisimilis (C)  Final   01/04/2022 Proteus mirabilis (C)  Final   01/04/2022 Streptococcus dysgalactiae ssp equisimilis (C)  Final   01/04/2022 Enterococcus faecalis (C)  Final     Comment:     Infectious disease  consultation is highly recommended.   01/04/2022 Gram Negative Bacilli (C)  Final   01/04/2022 Streptococcus dysgalactiae ssp equisimilis (C)  Final   01/04/2022 Enterococcus faecalis (C)  Final     Comment:     Infectious disease consultation is highly recommended.   01/04/2022 Alcaligenes faecalis ssp faecalis (C)  Final   01/04/2022 Staphylococcus, coagulase negative (C)  Final     Comment:     Probable contaminant requires clinical correlation, susceptibility not performed unless requested by physician.       No results found for: URINECX  No results found for: WOUNDCX  No results found for: STOOLCX    I have personally looked at the labs and they are summarized above.  ----------------------------------------------------------------------------------------------------------------------  Imaging Results (Last 24 Hours)     Procedure Component Value Units Date/Time    XR Chest 1 View [[id number]] Collected: 01/13/22 0753     Updated: 01/13/22 0755    Narrative:      XR CHEST 1 VW-     CLINICAL INDICATION: intubated; S00.83XA-Contusion of other part of  head, initial encounter; F10.231-Alcohol dependence with withdrawal  delirium        COMPARISON: 01/12/2022      TECHNIQUE: Single frontal view of the chest.     FINDINGS:     Bibasilar airspace disease  No interval line change  There is no evidence of an acute osseous abnormality.   There are no suspicious-appearing parenchymal soft tissue nodules.          Impression:      Bibasilar airspace disease     This report was finalized on 1/13/2022 7:53 AM by Dr. Jose Armando Zhu MD.       XR Chest 1 View [[id number]] Collected: 01/12/22 2142     Updated: 01/12/22 2144    Narrative:      CR Chest 1 Vw    INDICATION:   Check NG tube placement.     COMPARISON:    Chest 1/12/2022    FINDINGS:  Portable AP view(s) of the chest.    Enteric tube tip projects over the proximal body of the stomach. Support structures are otherwise stable.    The heart and mediastinal contours are  normal. The lungs are clear. No pneumothorax or pleural effusion.       Impression:      Enteric tube tip projects over the proximal body of the stomach.    Signer Name: Bert Shore MD   Signed: 1/12/2022 9:42 PM   Workstation Name: [id number]    [facility]        ----------------------------------------------------------------------------------------------------------------------  Assessment and Plan:    1.  Acute hypoxic respiratory failure -continue to hold sedation and once able to follow commands, will attempt spontaneous breathing trial.     2.  Alcohol withdrawal -CIWA protocol currently on hold, will start scheduled Librium once patient awakens if he continues to demonstrate signs of withdrawal.     3.  Aspiration pneumonia -continue Rocephin and vancomycin per infectious disease recommendations.  Have stopped Flagyl today.    4.  Severe sepsis -likely secondary to aspiration pneumonia, continue Rocephin and vancomycin.  Repeat blood cultures are negative to date.    5.  Bacteremia - initial blood cultures dated 1/4/2020 to demonstrate Proteus mirabilis and Enterococcus faecalis bacteremia.  Appreciate infectious disease recommendations, will continue Rocephin, Flagyl and vancomycin for now.  Repeat blood cultures negative to date    6.  Hypernatremia -serum sodium stable between 145 and 146.  We will continue to monitor closely.  Appreciate nephrology recommendations, continue current free water flushes.    7.  Acute kidney injury -serum creatinine relatively unchanged compared to yesterday.  Patient with approximately 500 cc of urine output yesterday.  Patient still net +1127 cc in the past 24 hours.  Currently no indication for emergent hemodialysis, appreciate nephrology recommendations.  Continues to worsen, patient with persistent oliguric ATN.  Patient will likely require hemodialysis in the near future.  However, patient's family have stated patient would not want to  pursue hemodialysis.  After explaining this will likely transition to a life-threatening event, patient's family expressed their understanding and still insisted patient would not want to proceed on hemodialysis.  If this were to occur, I have discussed with patient's family transitioning to comfort measures.    8. acute metabolic encephalopathy -patient has been without sedation for the past 24 hours.  Patient will open eyes to verbal cues but will not track and will not follow commands.  Etiology is likely multifactorial from uremia and residual sedating medications in the setting of acute renal failure and hepatic cirrhosis.  There is also possibility of a anoxic brain injury.  If no clear improvement in the next 24 hours, will repeat head CT.    Disposition condition is critical, patient is at high risk for worsening morbidity, and will likely require hemodialysis in the near future.        Bahman Ma,   01/13/22  15:23 EST

## 2022-01-13 NOTE — PLAN OF CARE
Goal Outcome Evaluation:  Plan of Care Reviewed With: patient        Progress: no change  Outcome Summary: VSS and afebrile. No sedation currently on at this time. Pt not following commands at this time, but is awake. 1 BM this shift.

## 2022-01-13 NOTE — PLAN OF CARE
Goal Outcome Evaluation:      Patient remains on the vent; however, no sedation is in use.  Patient does appear to be more alert today.  He is still not following commands but he is opening his eyes.  He has been on SBT most of day and tolerating well so far.  Afebrile.  BM x 2.  Minimal UOP at this time.  Saline locked.  Patient's father and brother are currently at bedside and have been updated on patient's status.

## 2022-01-14 NOTE — PROGRESS NOTES
PROGRESS NOTE         Patient Identification:  Name:  Jose Armando Reich  Age:  56 y.o.  Sex:  male  :  1965  MRN:  [id number]  Visit Number:  [id number]  Primary Care Provider:  Provider, No Known         LOS: 9 days       ----------------------------------------------------------------------------------------------------------------------  Subjective       Chief Complaints:    Fall        Interval History:      Patient remains intubated at 28% FiO2 today.  No new issues reported.  Afebrile, no diarrhea.  CRP is improved significantly at 5.30. WBC is stable at 11.97.  Blood cultures on 1/10/2022 are so far showing no growth.    Review of Systems:    Unable to obtain.  Intubated, but not following commands.  ----------------------------------------------------------------------------------------------------------------------      Objective       Current [facility] Meds:  bisacodyl, 10 mg, Rectal, Daily  cefTRIAXone, 2 g, Intravenous, Q24H  chlorhexidine, 15 mL, Mouth/Throat, Q12H  enoxaparin, 40 mg, Subcutaneous, Q24H  folic acid, 1 mg, Oral, Daily  insulin aspart, 0-7 Units, Subcutaneous, Q6H  insulin detemir, 15 Units, Subcutaneous, Daily  multivitamin, 1 tablet, Per G Tube, Daily  pantoprazole, 40 mg, Intravenous, Q AM  polyethylene glycol, 17 g, Oral, Daily  sodium chloride, 10 mL, Intravenous, Q12H  sodium chloride, 10 mL, Intravenous, Q12H  thiamine, 100 mg, Oral, Daily  Vancomycin Pharmacy Intermittent Dosing, , Does not apply, Daily      Pharmacy Consult - Pharmacy to dose,   Pharmacy Consult - Pharmacy to dose,   propofol, 5-50 mcg/kg/min, Last Rate: Stopped (22)      ----------------------------------------------------------------------------------------------------------------------    Vital Signs:  Temp:  [98.2 °F (36.8 °C)-99 °F (37.2 °C)] 98.7 °F (37.1 °C)  Heart Rate:  [] 92  Resp:  [14-24] 15  BP: (128-180)/(65-99) 137/73  FiO2 (%):  [28 %] 28 %  Mean Arterial  Pressure (Non-Invasive) for the past 24 hrs (Last 3 readings):   Noninvasive MAP (mmHg)   01/14/22 1302 94   01/14/22 1232 97   01/14/22 1202 102     SpO2 Percentage    01/14/22 1202 01/14/22 1232 01/14/22 1302   SpO2: 98% 98% 98%     SpO2:  [94 %-100 %] 98 %  on   ;   Device (Oxygen Therapy): ventilator    Body mass index is 21 kg/m².  Wt Readings from Last 3 Encounters:   01/14/22 76.2 kg (168 lb)        Intake/Output Summary (Last 24 hours) at 1/14/2022 1325  Last data filed at 1/14/2022 1305  Gross per 24 hour   Intake 1395 ml   Output 825 ml   Net 570 ml     NPO Diet  ----------------------------------------------------------------------------------------------------------------------      Physical Exam:    Constitutional: Chronically ill-appearing  male is intubated.  HENT:  Head: Normocephalic and atraumatic.  Mouth:  Moist mucous membranes.    Eyes:  Conjunctivae and EOM are normal.  No scleral icterus.  Neck:  Neck supple.  No JVD present.    Cardiovascular:  Normal rate, regular rhythm and normal heart sounds with no murmur. No edema.  Pulmonary/Chest: Intubated.  Lungs clear to auscultation bilaterally.  No crackles, no rhonchi, and no wheezes.  Abdominal:  Soft.  Bowel sounds are normal.  No distension and no tenderness.   Musculoskeletal:  No edema, no tenderness, and no deformity.  No swelling or redness of joints.  Neurological: Off sedation, but not following commands.   Skin:  Skin is warm and dry.  RLE erythema and warmth, but improving. Scattered ulcers and abrasions.   Psychiatric: Off sedation, but not following commands.    ----------------------------------------------------------------------------------------------------------------------  Results from last 7 days   Lab Units 01/08/22  0038   CK TOTAL U/L 131         Results from last 7 days   Lab Units 01/14/22  0449   PH, ARTERIAL pH units 7.387   PO2 ART mm Hg 86.7   PCO2, ARTERIAL mm Hg 29.6*   HCO3 ART mmol/L 17.8*     Results  from last 7 days   Lab Units 01/14/22  0657 01/12/22  2332 01/12/22  0020 01/10/22  2354 01/10/22  2354   CRP mg/dL 5.30*  --   --   --  25.26*   WBC 10*3/mm3 11.97* 11.26* 10.08   < > 12.88*   HEMOGLOBIN g/dL 8.9* 8.6* 9.0*   < > 9.3*   HEMATOCRIT % 28.3* 27.2* 29.2*   < > 31.0*   MCV fL 99.3* 97.5* 101.4*   < > 103.3*   MCHC g/dL 31.4* 31.6 30.8*   < > 30.0*   PLATELETS 10*3/mm3 258 224 184   < > 176    < > = values in this interval not displayed.     Results from last 7 days   Lab Units 01/14/22  0657 01/13/22  1134 01/13/22  0456 01/12/22 2332 01/12/22  2332 01/12/22  0554 01/12/22  0020   SODIUM mmol/L 144 146* 145   < > 143   < > 142   POTASSIUM mmol/L 4.5 4.8 4.5   < > 4.4   < > 4.7   MAGNESIUM mg/dL 2.6  --   --   --  2.3  --  2.1   CHLORIDE mmol/L 114* 116* 113*   < > 113*   < > 113*   CO2 mmol/L 13.4* 16.0* 17.4*   < > 16.3*   < > 16.1*   BUN mg/dL 105* 91* 89*   < > 83*   < > 73*   CREATININE mg/dL 8.30* 7.56* 7.70*   < > 7.23*   < > 6.36*   EGFR IF NONAFRICN AM mL/min/1.73 7* 7* 7*   < > 8*   < > 9*   CALCIUM mg/dL 8.4* 8.0* 7.9*   < > 7.7*   < > 7.1*   GLUCOSE mg/dL 197* 193* 107*   < > 200*   < > 313*   ALBUMIN g/dL  --   --   --   --  1.14*  --   --    BILIRUBIN mg/dL  --   --   --   --  0.3  --   --    ALK PHOS U/L  --   --   --   --  599*  --   --    AST (SGOT) U/L  --   --   --   --  48*  --   --    ALT (SGPT) U/L  --   --   --   --  23  --   --     < > = values in this interval not displayed.   Estimated Creatinine Clearance: 10.5 mL/min (A) (by C-G formula based on SCr of 8.3 mg/dL (H)).  No results found for: AMMONIA    Glucose   Date/Time Value Ref Range Status   01/14/2022 1134 231 (H) 70 - 130 mg/dL Final     Comment:     Meter: [id number] : [id number] KATHY OJEDA   01/14/2022 0605 213 (H) 70 - 130 mg/dL Final     Comment:     Meter: [id number] : [id number] MARITZA AGUIRRE   01/14/2022 0017 211 (H) 70 - 130 mg/dL Final     Comment:     Meter: [id number] : [id number] MARITZA AGUIRRE    01/13/2022 1748 198 (H) 70 - 130 mg/dL Final     Comment:     Meter: [id number] : [id number] KATHY BUTLER R   01/13/2022 1245 200 (H) 70 - 130 mg/dL Final     Comment:     Meter: [id number] : [id number] KATHY BUTLER R   01/13/2022 0621 103 70 - 130 mg/dL Final     Comment:     Meter: [id number] : [id number] MARITZA CORCORNELL   01/12/2022 2311 202 (H) 70 - 130 mg/dL Final     Comment:     Meter: [id number] : [id number] MARITZA CORMAN   01/12/2022 1755 248 (H) 70 - 130 mg/dL Final     Comment:     Meter: [id number] : [id number] Adalberto Mason     Lab Results   Component Value Date    HGBA1C 7.60 (H) 01/04/2022     No results found for: TSH, FREET4    Blood Culture   Date Value Ref Range Status   01/07/2022 No growth at 24 hours  Preliminary   01/04/2022 Proteus mirabilis (C)  Final   01/04/2022 Streptococcus dysgalactiae ssp equisimilis (C)  Final   01/04/2022 Enterococcus faecalis (C)  Final     Comment:     Infectious disease consultation is highly recommended.   01/04/2022 Gram Negative Bacilli (C)  Final   01/04/2022 Streptococcus dysgalactiae ssp equisimilis (C)  Final   01/04/2022 Enterococcus faecalis (C)  Final     Comment:     Infectious disease consultation is highly recommended.   01/04/2022 Alcaligenes faecalis ssp faecalis (C)  Final   01/04/2022 Staphylococcus, coagulase negative (C)  Final     Comment:     Probable contaminant requires clinical correlation, susceptibility not performed unless requested by physician.       No results found for: URINECX  No results found for: WOUNDCX  No results found for: STOOLCX  No results found for: RESPCX  Pain Management Panel       Pain Management Panel Latest Ref Rng & Units 1/4/2022    AMPHETAMINES SCREEN, URINE Negative Negative    BARBITURATES SCREEN Negative Negative    BENZODIAZEPINE SCREEN, URINE Negative Negative    BUPRENORPHINEUR Negative Negative    COCAINE SCREEN, URINE Negative Negative    METHADONE SCREEN, URINE Negative Negative     METHAMPHETAMINEUR Negative Negative              ----------------------------------------------------------------------------------------------------------------------  Imaging Results (Last 24 Hours)       Procedure Component Value Units Date/Time    CT Head Without Contrast [[id number]] Collected: 01/14/22 0742     Updated: 01/14/22 0745    Narrative:      CT HEAD WO CONTRAST-     CLINICAL INDICATION: Neuro deficit, acute, stroke suspected;  S00.83XA-Contusion of other part of head, initial encounter;  F10.231-Alcohol dependence with withdrawal delirium        COMPARISON: 01/04/2022      TECHNIQUE: Axial images of the brain were obtained with out intravenous  contrast.  Reformatted images were created in the sagittal and coronal  planes.     DOSE: 1121.70 mGy.cm     Radiation dose reduction techniques were utilized per ALARA protocol.  Automated exposure control was initiated through either or [employer] or  [employer] software packages by  protocol.           FINDINGS:   Today's study shows no mass, hemorrhage, or midline shift.   The ventricles, cisterns, and sulci are unremarkable. There is no  hydrocephalus.   There is no evidence of acute ischemia.  I do not see epidural or subdural hematoma.  The gray-white differentiation is appropriate.   The bone window setting images show no destructive calvarial lesion or  acute calvarial fracture.   The posterior fossa is unremarkable.          Impression:      No acute intracranial pathology. Nothing is seen on this exam to  specifically account for the patient's symptoms.     This report was finalized on 1/14/2022 7:43 AM by Dr. Jose Armando Zhu MD.       XR Chest 1 View [[id number]] Collected: 01/14/22 0742     Updated: 01/14/22 0744    Narrative:      XR CHEST 1 VW-     CLINICAL INDICATION: intubated; S00.83XA-Contusion of other part of  head, initial encounter; F10.231-Alcohol dependence with withdrawal  delirium        COMPARISON: 01/13/2022      TECHNIQUE:  Single frontal view of the chest.     FINDINGS:     Minimal right basilar airspace disease  No interval line change  There is no evidence of an acute osseous abnormality.   There are no suspicious-appearing parenchymal soft tissue nodules.          Impression:      Overall slight interval improvement in aeration of the lungs     This report was finalized on 1/14/2022 7:42 AM by Dr. Jose Armando Zhu MD.               ----------------------------------------------------------------------------------------------------------------------    Assessment/Plan       Assessment/Plan     ASSESSMENT:    1. Severe sepsis with lactic acid greater than 2 on admission  2. Bacteremia  3. Cellulitis right lower extremitiy    PLAN:    Patient remains intubated at 28% FiO2 today.  No new issues reported.  Afebrile, no diarrhea.  CRP is improved significantly at 5.30.  WBC is stable at 11.97.  Blood cultures on 1/10/2022 are so far showing no growth.     Chest x-ray on 1/13/2022 shows bibasilar airspace disease.     Status post bronchoscopy on 1/10/2022.  Respiratory culture from 1/10/2022 finalized as no growth.    Blood cultures on 1/10/2022 are so far showing no growth.    Blood culture from 1/7/2022 finalized as Streptococcus dysgalactiae.   Blood cultures from 1/4/2022 finalized as 2 out of 2 sets positive for Streptococcus dysgalactiae and Enterococcus faecalis.  1 out of 2 were noted with Alcaligenes faecalis, coagulase-negative Staphylococcus, and Proteus mirabilis.    MRSA PCR negative. COVID-19 and influenza PCR negative. Lactic 2.2 on admission. Urinalysis unremarkable for infection.CT of the abdomen and pelvis from 1/5/22 reports diffuse fatty change of the liver. CT Chest from 1/5/22 unremarkable. CT of the facial bones from 1/4/22 reports significant periapical lucency seen on the lower left and the mandible. CT of the head from 1/4/22 reports significant periapical lucency seen on the lower left and the mandible.    2D echo  from 1/7/2022 reports no evidence of vegetation.    On 1/8/22 patient experienced aspiration event and rapid response was called. Patient became pulseless and CODE BLUE was called, but ROSC was obtained.  Patient was intubated.      Recommend to continue Rocephin and vancomycin for now. We will continue to follow closely and adjust antibiotic therapy as needed.    Code Status:   Code Status and Medical Interventions:   Ordered at: 01/10/22 1439     Medical Intervention Limits:    NO dialysis     Level Of Support Discussed With:    Next of Kin (If No Surrogate)     Code Status (Patient has no pulse and is not breathing):    CPR (Attempt to Resuscitate)     Medical Interventions (Patient has pulse or is breathing):    Limited Support       MEKHI Magana  01/14/22  13:25 EST

## 2022-01-14 NOTE — PROGRESS NOTES
"[facility]      Mar/chart reviewed  Follow up AHRF, pneumonia, alcohol withdrawal  Sedation off, but patient not following commands  Eyes are open, tries to follow some commands       Vital Sign Min/Max for last 24 hours  Temp  Min: 98.2 °F (36.8 °C)  Max: 99 °F (37.2 °C)   BP  Min: 129/71  Max: 180/96   Pulse  Min: 79  Max: 101   Resp  Min: 14  Max: 24   SpO2  Min: 94 %  Max: 100 %   No data recorded   Weight  Min: 76.2 kg (168 lb)  Max: 76.2 kg (168 lb)   1395/550  Appears ill,  eyes open  makes eye contact and tries to follow commands  Perrl,normal sclera  mmm, no jvd, trachea midline, neck supple,  chest decreased coarse bilaterally, no crackles, no wheezes,   rrr,   soft, nt, nd +bs,  no c/c/ e  Skin warm, dry no rashes    Labs: 1/14: reviewed:  Glucose 197  Bun 105  Cr 8.3  Bicarb 13.4  Wbc 11.9  hgb 8.9  plts 258  7.38/29/86  1/14: CXR: reviewed tubes, line in place, right lower lobe infiltrate, maybe a small layering effusion       A/P:  1. Acute hypoxemic respiratory failure -- oxygenation not bad, daily sbt/weaning trials. --mental status slowly improving may preclude extubation  2. Alcohol withdrawal, abuse and dependence with delerium tremens -- no longer needing prn benzos  3. Aspiration pneumonia -- antibitoics per ID  4. S/p code blue arrest -- sounds like was primarily a respiratory arrest due to inability to protect airway  5. HEIDI -- nephrology following  6. Hypernatremia \"  \"  7. Megaloblastic anemia  8. Bacteremia  9. Acidemia     Mental status preventing extubation,    CC 35         "

## 2022-01-14 NOTE — CASE MANAGEMENT/SOCIAL WORK
Discharge Planning Assessment   Adrien     Patient Name: Jose Armando Reich  MRN: [id number]  Today's Date: 1/14/2022    Admit Date: 1/4/2022     Discharge Plan     Row Name 01/14/22 1241       Plan    Plan Pt admitted 1/4/22. Pt remains intubated and off sedation. Pt was discussed with rounding team on this date, pt is slowly starting to try to follow commands. Pt had been on SBT since yesterday. Pt is from home and does not utilize home health services or DME. Discharge plan pending improvement. SS to continue to follow and assist.              WILLIAM Tyson

## 2022-01-14 NOTE — PLAN OF CARE
Goal Outcome Evaluation:      Patient remains on the vent but is not on any sedation.  Continues with TF and IV antibiotics.  Afebrile.  Ya patent with minimal output.  Father and brother have visited today and met with Dr. Ma.  Update on status/prognosis given.

## 2022-01-14 NOTE — PROGRESS NOTES
Nephrology Progress Note      Subjective     Remains on vent, no change in clinical status  Objective       Vital signs :     Temp:  [97.6 °F (36.4 °C)-99 °F (37.2 °C)] 99 °F (37.2 °C)  Heart Rate:  [] 98  Resp:  [14-24] 24  BP: (129-180)/(71-99) 150/80  FiO2 (%):  [28 %] 28 %      Intake/Output Summary (Last 24 hours) at 1/14/2022 0742  Last data filed at 1/14/2022 0721  Gross per 24 hour   Intake 1395 ml   Output 700 ml   Net 695 ml       Physical Exam:    General Appearance : intubated sedated  Lungs : clear to auscultation, respirations regular  Heart :  regular rhythm & normal rate, normal S1, S2 and no murmur, no rub  Abdomen : normal bowel sounds, no masses, no hepatomegaly, no splenomegaly, soft non-tender and no guarding  Extremities : no edema, no cyanosis and no redness  Neurologic :   Sedated,  Unable to  Assess.          Laboratory Data :     Albumin Albumin   Date Value Ref Range Status   01/12/2022 1.14 (L) 3.50 - 5.20 g/dL Final      Magnesium Magnesium   Date Value Ref Range Status   01/12/2022 2.3 1.6 - 2.6 mg/dL Final   01/12/2022 2.1 1.6 - 2.6 mg/dL Final          PTH               No results found for: PTH    CBC and coagulation:  Results from last 7 days   Lab Units 01/12/22  2332 01/12/22  0020 01/10/22  2354   CRP mg/dL  --   --  25.26*   WBC 10*3/mm3 11.26* 10.08 12.88*   HEMOGLOBIN g/dL 8.6* 9.0* 9.3*   HEMATOCRIT % 27.2* 29.2* 31.0*   MCV fL 97.5* 101.4* 103.3*   MCHC g/dL 31.6 30.8* 30.0*   PLATELETS 10*3/mm3 224 184 176     Acid/base balance:  Results from last 7 days   Lab Units 01/14/22  0449 01/13/22  0424 01/12/22  0436   PH, ARTERIAL pH units 7.387 7.384 7.335*   PO2 ART mm Hg 86.7 108.0 115.0*   PCO2, ARTERIAL mm Hg 29.6* 30.6* 34.0*   HCO3 ART mmol/L 17.8* 18.3* 18.1*     Renal and electrolytes:  Results from last 7 days   Lab Units 01/13/22  1134 01/13/22  0456 01/12/22  2332 01/12/22  1737 01/12/22  1737 01/12/22  1134 01/12/22  1134 01/12/22  0554 01/12/22  0020  01/11/22  0657 01/10/22  2354   SODIUM mmol/L 146* 145 143  --  143  --  145   < > 142   < > 147*   POTASSIUM mmol/L 4.8 4.5 4.4   < > 4.5   < > 4.6   < > 4.7   < > 4.9   MAGNESIUM mg/dL  --   --  2.3  --   --   --   --   --  2.1  --  2.1   CHLORIDE mmol/L 116* 113* 113*   < > 114*   < > 115*   < > 113*   < > 119*   CO2 mmol/L 16.0* 17.4* 16.3*   < > 16.2*   < > 16.7*   < > 16.1*   < > 14.3*   BUN mg/dL 91* 89* 83*   < > 83*   < > 80*   < > 73*   < > 59*   CREATININE mg/dL 7.56* 7.70* 7.23*  --  7.05*  --  6.70*   < > 6.36*   < > 5.64*   EGFR IF NONAFRICN AM mL/min/1.73 7* 7* 8*   < > 8*   < > 9*   < > 9*   < > 10*   CALCIUM mg/dL 8.0* 7.9* 7.7*   < > 7.6*   < > 7.4*   < > 7.1*   < > 6.8*   PHOSPHORUS mg/dL  --   --  4.6*  --   --   --   --   --  5.4*  --  5.1*    < > = values in this interval not displayed.     Estimated Creatinine Clearance: 11.5 mL/min (A) (by C-G formula based on SCr of 7.56 mg/dL (H)).    Liver and pancreatic function:  Results from last 7 days   Lab Units 01/12/22  2332 01/10/22  2354   ALBUMIN g/dL 1.14*  --    BILIRUBIN mg/dL 0.3  --    ALK PHOS U/L 599*  --    AST (SGOT) U/L 48*  --    ALT (SGPT) U/L 23  --    AMMONIA umol/L  --  28         Cardiac:      Liver and pancreatic function:  Results from last 7 days   Lab Units 01/12/22  2332 01/10/22  2354   ALBUMIN g/dL 1.14*  --    BILIRUBIN mg/dL 0.3  --    ALK PHOS U/L 599*  --    AST (SGOT) U/L 48*  --    ALT (SGPT) U/L 23  --    AMMONIA umol/L  --  28       Medications :     bisacodyl, 10 mg, Rectal, Daily  cefTRIAXone, 2 g, Intravenous, Q24H  chlorhexidine, 15 mL, Mouth/Throat, Q12H  enoxaparin, 40 mg, Subcutaneous, Q24H  folic acid, 1 mg, Oral, Daily  insulin aspart, 0-7 Units, Subcutaneous, Q6H  insulin detemir, 15 Units, Subcutaneous, Daily  multivitamin, 1 tablet, Per G Tube, Daily  pantoprazole, 40 mg, Intravenous, Q AM  polyethylene glycol, 17 g, Oral, Daily  sodium chloride, 10 mL, Intravenous, Q12H  sodium chloride, 10 mL,  Intravenous, Q12H  thiamine, 100 mg, Oral, Daily  Vancomycin Pharmacy Intermittent Dosing, , Does not apply, Daily      Pharmacy Consult - Pharmacy to dose,   Pharmacy Consult - Pharmacy to dose,   propofol, 5-50 mcg/kg/min, Last Rate: Stopped (01/11/22 0923)          Assessment/Plan     1. HEIDI, ATN, baseline Cr around 1.2  2. Hypernatremia  3. Metabolic acidosis  4. Metabolic encephalopathy  4. Sepsis with bacteremia  5. Hypoxic respiratory failure on vent    Renal functions continue to get worse, with mild hypernatremia otherwise electrolytes are ok, continue on conservative management, 30 cc/h free water with TF and increase free water if sodium continue to get worse  Dr Ma is having family discussion today, for goals of care discussion.   As mentioned previously, patient' father has declined for  dialysis      Negrito Solomon MD  01/14/22  07:42 EST

## 2022-01-15 NOTE — PROGRESS NOTES
Nephrology Progress Note    Interval History:     Patient Complaints: Patient on the ventilator.  Tolerating feeds.  Nurse reports no acute issues.  Reports family has not made any decisions about dialysis and that Dr. Ma has been talking to the family        Vital Signs  Temp:  [97.3 °F (36.3 °C)-99.2 °F (37.3 °C)] 97.3 °F (36.3 °C)  Heart Rate:  [] 99  Resp:  [14-21] 16  BP: (137-178)/() 151/85  FiO2 (%):  [28 %] 28 %    Physical Exam:    General:           Intubated      HEENT:  No facial asymmetry               Neck:  No JVD       Lungs:    basal crackles   Heart:   Regular,  no rub       Abdomen:   Normal bowel sounds, soft non-tender, non-distended, no guarding       Extremities:  No edema       Skin: No petechiae, no rash                Results Review:    I reviewed the patient's new clinical results.    Lab Results (last 24 hours)     Procedure Component Value Units Date/Time    Blood Culture - Blood, Arm, Right [[id number]]  (Normal) Collected: 01/10/22 0833    Specimen: Blood from Arm, Right Updated: 01/15/22 1000     Blood Culture No growth at 5 days    Blood Culture - Blood, Arm, Left [[id number]]  (Normal) Collected: 01/10/22 0833    Specimen: Blood from Arm, Left Updated: 01/15/22 1000     Blood Culture No growth at 5 days    Vancomycin, Random [[id number]]  (Normal) Collected: 01/15/22 0751    Specimen: Blood Updated: 01/15/22 0843     Vancomycin Random 20.20 mcg/mL     Narrative:      Therapeutic Ranges for Vancomycin    Vancomycin Random   5.0-40.0 mcg/mL  Vancomycin Trough   5.0-20.0 mcg/mL  Vancomycin Peak     20.0-40.0 mcg/mL    POC Glucose Once [[id number]]  (Abnormal) Collected: 01/15/22 0517    Specimen: Blood Updated: 01/15/22 0533     Glucose 246 mg/dL      Comment: Meter: [id number] : [id number] BRENT ANTONIO       Blood Gas, Arterial With Co-Ox [[id number]]  (Abnormal) Collected: 01/15/22 5123    Specimen:    Panel Management Review      Patient has the following on her problem list: None      Composite cancer screening  Chart review shows that this patient is due/due soon for the following Pap Smear  Summary:    Patient is due/failing the following:   PAP and PHYSICAL    Action needed:   Patient needs office visit for see above.    Type of outreach:    pt has an appointment on 11-19-19 for a physical.    Questions for provider review:    None                                                                                                                                    .GUSTAVO MONACO LPN       Chart routed to none .           Arterial Blood Updated: 01/15/22 0425     Site Right Brachial     Leonidas's Test N/A     pH, Arterial 7.416 pH units      pCO2, Arterial 26.0 mm Hg      Comment: 84 Value below reference range        pO2, Arterial 129.0 mm Hg      Comment: 83 Value above reference range        HCO3, Arterial 16.7 mmol/L      Comment: 84 Value below reference range        Base Excess, Arterial -6.9 mmol/L      O2 Saturation, Arterial >99.2 %      Comment: 93 Value above reportable range > 99.2        Hemoglobin, Blood Gas 8.2 g/dL      Comment: 84 Value below reference range        Hematocrit, Blood Gas 25.1 %      Comment: 84 Value below reference range        Oxyhemoglobin 98.1 %      Methemoglobin 0.20 %      Carboxyhemoglobin 1.0 %      A-a Gradiant 32.5 mmHg      CO2 Content 17.5 mmol/L      Temperature 0.0 C      Barometric Pressure for Blood Gas 728 mmHg      Modality Ventilator     FIO2 28 %      Ventilator Mode PS     PEEP 5.0     PSV 10.0 cmH2O      Note --     Collected by [id number]     Comment: Meter: [id number]     :  [id number]        pH, Temp Corrected --     pCO2, Temperature Corrected --     pO2, Temperature Corrected --    Basic Metabolic Panel [[id number]]  (Abnormal) Collected: 01/15/22 0113    Specimen: Blood Updated: 01/15/22 0207     Glucose 241 mg/dL       mg/dL      Creatinine 9.33 mg/dL      Sodium 141 mmol/L      Potassium 4.5 mmol/L      Chloride 112 mmol/L      CO2 15.7 mmol/L      Calcium 8.6 mg/dL      eGFR   Amer --     Comment: <15 Indicative of kidney failure.        eGFR Non African Amer 6 mL/min/1.73      Comment: <15 Indicative of kidney failure.        BUN/Creatinine Ratio 12.8     Anion Gap 13.3 mmol/L     Narrative:      GFR Normal >60  Chronic Kidney Disease <60  Kidney Failure <15      Phosphorus [[id number]]  (Abnormal) Collected: 01/15/22 0113    Specimen: Blood Updated: 01/15/22 0159     Phosphorus 5.5 mg/dL     Magnesium [[id number]]  (Abnormal) Collected: 01/15/22 0113     Specimen: Blood Updated: 01/15/22 0159     Magnesium 2.8 mg/dL     CBC & Differential [[id number]]  (Abnormal) Collected: 01/15/22 0113    Specimen: Blood Updated: 01/15/22 0136    Narrative:      The following orders were created for panel order CBC & Differential.  Procedure                               Abnormality         Status                     ---------                               -----------         ------                     CBC Auto Differential[[id number]]        Abnormal            Final result                 Please view results for these tests on the individual orders.    CBC Auto Differential [[id number]]  (Abnormal) Collected: 01/15/22 0113    Specimen: Blood Updated: 01/15/22 0136     WBC 11.68 10*3/mm3      RBC 2.55 10*6/mm3      Hemoglobin 7.8 g/dL      Hematocrit 24.4 %      MCV 95.7 fL      MCH 30.6 pg      MCHC 32.0 g/dL      RDW 15.9 %      RDW-SD 55.3 fl      MPV 9.6 fL      Platelets 274 10*3/mm3      Neutrophil % 76.0 %      Lymphocyte % 11.9 %      Monocyte % 6.7 %      Eosinophil % 0.9 %      Basophil % 0.7 %      Immature Grans % 3.8 %      Neutrophils, Absolute 8.89 10*3/mm3      Lymphocytes, Absolute 1.39 10*3/mm3      Monocytes, Absolute 0.78 10*3/mm3      Eosinophils, Absolute 0.10 10*3/mm3      Basophils, Absolute 0.08 10*3/mm3      Immature Grans, Absolute 0.44 10*3/mm3      nRBC 0.0 /100 WBC     POC Glucose Once [[id number]]  (Abnormal) Collected: 01/15/22 0010    Specimen: Blood Updated: 01/15/22 0017     Glucose 232 mg/dL      Comment: Meter: [id number] : [id number] BRENT ANTONIO       POC Glucose Once [[id number]]  (Abnormal) Collected: 01/14/22 1751    Specimen: Blood Updated: 01/14/22 1805     Glucose 268 mg/dL      Comment: Meter: [id number] : [id number] KATHY OJEDA             Imaging Results (Last 24 Hours)     Procedure Component Value Units Date/Time    XR Chest 1 View [[id number]] Collected: 01/15/22 0934     Updated: 01/15/22 0937    Narrative:       XR CHEST 1 VW-     CLINICAL INDICATION: intubated; S00.83XA-Contusion of other part of  head, initial encounter; F10.231-Alcohol dependence with withdrawal  delirium        COMPARISON: 01/14/2022      TECHNIQUE: Single frontal view of the chest.     FINDINGS:     There is no focal alveolar infiltrate or effusion.  NG tube is been retracted. The tip is just proximal to the GE junction.  Other lines are stable  There is no evidence of an acute osseous abnormality.   There are no suspicious-appearing parenchymal soft tissue nodules.          Impression:      NG tube tip is been retracted. It is now just proximal to the GE  junction     This report was finalized on 1/15/2022 9:35 AM by Dr. Jose Armando Zhu MD.             Assessment and Plan:    1.  Acute renal failure with acute tubular necrosis  2.  Hypoxic respiratory failure  3.  Metabolic encephalopathy  4.  Streptococcus  bacteremia with sepsis    Will defer to Dr. Ma who has been communicating with family as to their willingness to proceed with dialysis.  There is no immediate need for the same today but his GFR is extremely low and is only a matter of time as the creatinine is still rising.    Sarbjit Rodriguez MD  01/15/22  12:28 EST

## 2022-01-15 NOTE — PLAN OF CARE
Problem: Adult Inpatient Plan of Care  Goal: Plan of Care Review  Outcome: Ongoing, Progressing   Pt remains intubated on vent pressure support only. Mental status has not improved. Unable to follow commands, arousing to voice. VSS. PRN meds given for HTN. SR/ST. WCTM

## 2022-01-15 NOTE — PROGRESS NOTES
[facility] HOSPITALIST PROGRESS NOTE     Patient Identification:  Name:  Jose Armando Reich  Age:  56 y.o.  Sex:  male  :  1965  MRN:  [id number]  Visit Number:  [id number]  Primary Care Provider:  Provider, No Known    Length of stay:  9    Chief complaint: Intubated and sedated    Subjective:    Patient continues to be intubated and sedated.  Patient has relatively no change in comparison to yesterday.  Did have another conversation with patient's father and brother at bedside.  Patient's father has expressed he does not wish to see patient placed on hemodialysis.  Note is made of worsening creatinine but electrolytes are relatively stable and no evidence of pulmonary edema/pleural effusions.  As such, there is no emergent need for hemodialysis at this time.  With this in mind, patient's family has elected to continue mechanical ventilation and give patient a chance to wake up so he can make medical decisions on his own.  However, patient's family has made it very clear that if he were to continue to decline to the point he would require hemodialysis or die, they would request to make him comfort measures and not place him on hemodialysis.  It should be noted patient has been off of sedation for 72 hours with no improvement in neurologic status.  ----------------------------------------------------------------------------------------------------------------------  Current Hospital Meds:  bisacodyl, 10 mg, Rectal, Daily  cefTRIAXone, 2 g, Intravenous, Q24H  chlorhexidine, 15 mL, Mouth/Throat, Q12H  enoxaparin, 40 mg, Subcutaneous, Q24H  folic acid, 1 mg, Oral, Daily  insulin aspart, 0-7 Units, Subcutaneous, Q6H  insulin detemir, 15 Units, Subcutaneous, Daily  multivitamin, 1 tablet, Per G Tube, Daily  pantoprazole, 40 mg, Intravenous, Q AM  polyethylene glycol, 17 g, Oral, Daily  sodium chloride, 10 mL, Intravenous, Q12H  sodium chloride, 10 mL, Intravenous, Q12H  thiamine, 100 mg, Oral,  Daily  Vancomycin Pharmacy Intermittent Dosing, , Does not apply, Daily      Pharmacy Consult - Pharmacy to dose,   Pharmacy Consult - Pharmacy to dose,   propofol, 5-50 mcg/kg/min, Last Rate: Stopped (01/11/22 0923)      ----------------------------------------------------------------------------------------------------------------------  Vital Signs:  Temp:  [98.5 °F (36.9 °C)-99.2 °F (37.3 °C)] 99.2 °F (37.3 °C)  Heart Rate:  [] 94  Resp:  [15-24] 19  BP: (128-180)/(65-99) 163/92  FiO2 (%):  [28 %] 28 %      01/12/22  0542 01/13/22  0500 01/14/22  0500   Weight: 79.9 kg (176 lb 3.2 oz) 79.9 kg (176 lb 3.2 oz) 76.2 kg (168 lb)     Body mass index is 21 kg/m².    Intake/Output Summary (Last 24 hours) at 1/14/2022 1912  Last data filed at 1/14/2022 1755  Gross per 24 hour   Intake 1428 ml   Output 650 ml   Net 778 ml     NPO Diet  ----------------------------------------------------------------------------------------------------------------------  Physical exam:  Constitutional: Acutely ill-appearing  male, currently intubated with no sedation, patient will intermittently open eyes and track to sound   HENT:  Head:  Normocephalic and atraumatic.  Mouth:  Moist mucous membranes.    Eyes:  Conjunctivae are normal.   No scleral icterus.    Neck:  Neck supple. No thyromegaly.  No JVD present.    Cardiovascular:  Regular rate and rhythm with no murmurs, rubs, clicks or gallops appreciated.  Pulmonary/Chest:  Clear to auscultation bilaterally with no crackles, wheezes or rhonchi appreciated.  Abdominal:  Soft. Nontender. Nondistended  Bowel sounds are normal in all four quadrants. No organomegally appreciated.   Musculoskeletal:  No edema, no tenderness, and no deformity.  No red or swollen joints anywhere.    Neurological: Intubated and sedated.   Skin:  Warm and dry to palpation with no rashes or lesions appreciated.  Peripheral vascular:  2+ radial and pedal pulses in bilateral upper and lower  extremities.    ------------------------------------------------------------------------------    ----------------------------------------------------------------------------------------------------------------------  Results from last 7 days   Lab Units 01/08/22  0038   CK TOTAL U/L 131     Results from last 7 days   Lab Units 01/14/22  0657 01/12/22 2332 01/12/22 0020 01/10/22  2354 01/10/22  2354   CRP mg/dL 5.30*  --   --   --  25.26*   WBC 10*3/mm3 11.97* 11.26* 10.08   < > 12.88*   HEMOGLOBIN g/dL 8.9* 8.6* 9.0*   < > 9.3*   HEMATOCRIT % 28.3* 27.2* 29.2*   < > 31.0*   MCV fL 99.3* 97.5* 101.4*   < > 103.3*   MCHC g/dL 31.4* 31.6 30.8*   < > 30.0*   PLATELETS 10*3/mm3 258 224 184   < > 176    < > = values in this interval not displayed.     Results from last 7 days   Lab Units 01/14/22  0449   PH, ARTERIAL pH units 7.387   PO2 ART mm Hg 86.7   PCO2, ARTERIAL mm Hg 29.6*   HCO3 ART mmol/L 17.8*     Results from last 7 days   Lab Units 01/14/22  0657 01/13/22  1134 01/13/22  0456 01/12/22 2332 01/12/22  2332 01/12/22  0554 01/12/22  0020   SODIUM mmol/L 144 146* 145   < > 143   < > 142   POTASSIUM mmol/L 4.5 4.8 4.5   < > 4.4   < > 4.7   MAGNESIUM mg/dL 2.6  --   --   --  2.3  --  2.1   CHLORIDE mmol/L 114* 116* 113*   < > 113*   < > 113*   CO2 mmol/L 13.4* 16.0* 17.4*   < > 16.3*   < > 16.1*   BUN mg/dL 105* 91* 89*   < > 83*   < > 73*   CREATININE mg/dL 8.30* 7.56* 7.70*   < > 7.23*   < > 6.36*   EGFR IF NONAFRICN AM mL/min/1.73 7* 7* 7*   < > 8*   < > 9*   CALCIUM mg/dL 8.4* 8.0* 7.9*   < > 7.7*   < > 7.1*   GLUCOSE mg/dL 197* 193* 107*   < > 200*   < > 313*   ALBUMIN g/dL  --   --   --   --  1.14*  --   --    BILIRUBIN mg/dL  --   --   --   --  0.3  --   --    ALK PHOS U/L  --   --   --   --  599*  --   --    AST (SGOT) U/L  --   --   --   --  48*  --   --    ALT (SGPT) U/L  --   --   --   --  23  --   --     < > = values in this interval not displayed.   Estimated Creatinine Clearance: 10.5 mL/min (A)  (by C-G formula based on SCr of 8.3 mg/dL (H)).    No results found for: AMMONIA      Blood Culture   Date Value Ref Range Status   01/07/2022 Streptococcus dysgalactiae ssp equisimilis (C)  Final   01/04/2022 Proteus mirabilis (C)  Final   01/04/2022 Streptococcus dysgalactiae ssp equisimilis (C)  Final   01/04/2022 Enterococcus faecalis (C)  Final     Comment:     Infectious disease consultation is highly recommended.   01/04/2022 Gram Negative Bacilli (C)  Final   01/04/2022 Streptococcus dysgalactiae ssp equisimilis (C)  Final   01/04/2022 Enterococcus faecalis (C)  Final     Comment:     Infectious disease consultation is highly recommended.   01/04/2022 Alcaligenes faecalis ssp faecalis (C)  Final   01/04/2022 Staphylococcus, coagulase negative (C)  Final     Comment:     Probable contaminant requires clinical correlation, susceptibility not performed unless requested by physician.       No results found for: URINECX  No results found for: WOUNDCX  No results found for: STOOLCX    I have personally looked at the labs and they are summarized above.  ----------------------------------------------------------------------------------------------------------------------  Imaging Results (Last 24 Hours)     Procedure Component Value Units Date/Time    CT Head Without Contrast [[id number]] Collected: 01/14/22 0742     Updated: 01/14/22 0745    Narrative:      CT HEAD WO CONTRAST-     CLINICAL INDICATION: Neuro deficit, acute, stroke suspected;  S00.83XA-Contusion of other part of head, initial encounter;  F10.231-Alcohol dependence with withdrawal delirium        COMPARISON: 01/04/2022      TECHNIQUE: Axial images of the brain were obtained with out intravenous  contrast.  Reformatted images were created in the sagittal and coronal  planes.     DOSE: 1121.70 mGy.cm     Radiation dose reduction techniques were utilized per ALARA protocol.  Automated exposure control was initiated through either or CareDose  or  Tripping software packages by  protocol.           FINDINGS:   Today's study shows no mass, hemorrhage, or midline shift.   The ventricles, cisterns, and sulci are unremarkable. There is no  hydrocephalus.   There is no evidence of acute ischemia.  I do not see epidural or subdural hematoma.  The gray-white differentiation is appropriate.   The bone window setting images show no destructive calvarial lesion or  acute calvarial fracture.   The posterior fossa is unremarkable.          Impression:      No acute intracranial pathology. Nothing is seen on this exam to  specifically account for the patient's symptoms.     This report was finalized on 1/14/2022 7:43 AM by Dr. Jose Armando Zhu MD.       XR Chest 1 View [[id number]] Collected: 01/14/22 0742     Updated: 01/14/22 0744    Narrative:      XR CHEST 1 VW-     CLINICAL INDICATION: intubated; S00.83XA-Contusion of other part of  head, initial encounter; F10.231-Alcohol dependence with withdrawal  delirium        COMPARISON: 01/13/2022      TECHNIQUE: Single frontal view of the chest.     FINDINGS:     Minimal right basilar airspace disease  No interval line change  There is no evidence of an acute osseous abnormality.   There are no suspicious-appearing parenchymal soft tissue nodules.          Impression:      Overall slight interval improvement in aeration of the lungs     This report was finalized on 1/14/2022 7:42 AM by Dr. Jose Armando Zhu MD.           ----------------------------------------------------------------------------------------------------------------------  Assessment and Plan:    1.  Acute hypoxic respiratory failure -patient appears ready for extubation from a respiratory standpoint.  However, we will continue mechanical ventilation as patient's mental status will prohibit him from successful extubation.    2.  Alcohol withdrawal -no evidence of continued alcohol withdrawal.    3.  Aspiration pneumonia -continue Rocephin and  vancomycin per infectious disease recommendations.      4.  Severe sepsis -likely secondary to aspiration pneumonia, continue Rocephin and vancomycin.  Repeat blood cultures are negative to date.    5.  Bacteremia - initial blood cultures dated 1/4/2020 to demonstrate Proteus mirabilis and Enterococcus faecalis bacteremia.  Appreciate infectious disease recommendations, will continue Rocephin and vancomycin for now.  Repeat blood cultures negative to date    6.  Hypernatremia -serum sodium slightly improved at 144 today.  Continue 30 cc/hour free water flushes with tube feed.  Will increase free water flushes if sodium worsens.  Appreciate nephrology recommendations.    7.  Acute kidney injury -serum creatinine has worsened again today.  Patient has had 400 cc of urine output in the past 24 hours.  Currently no indication for emergent hemodialysis, appreciate nephrology recommendations.      8. Acute metabolic encephalopathy -patient has been without sedation for 72 hours.  Patient will intermittently open his eyes but will not track movements or show purposeful movement.  CT of head without evidence of anoxic brain injury.  Etiology of encephalopathy not quite clear but likely multifactorial from chronic alcohol use, possible vitamin deficiency and inability to clear sedating medications secondary to renal failure and liver cirrhosis    Disposition condition is critical, patient is at high risk for worsening morbidity, and will likely require hemodialysis in the near future.        Bahman Ma,   01/14/22  19:12 EST

## 2022-01-15 NOTE — PROGRESS NOTES
PROGRESS NOTE         Patient Identification:  Name:  Jose Armando Reich  Age:  56 y.o.  Sex:  male  :  1965  MRN:  [id number]  Visit Number:  [id number]  Primary Care Provider:  Provider, No Known         LOS: 10 days       ----------------------------------------------------------------------------------------------------------------------  Subjective       Chief Complaints:    Fall        Interval History:      Patient remains intubated at 28% FiO2 today. Nurse and CNA at bedside. No new issues reported. Patient seems to be more responsive today and can blink his eyes by command. Afebrile, no diarrhea. WBC is stable at 11.68.  Blood cultures on 1/10/2022 finalized as no growth. Chest x-ray on 1/15/2022 shows there is no focal alveolar infiltrate or effusion.    Review of Systems:    Unable to obtain.  Intubated, but not following commands.  ----------------------------------------------------------------------------------------------------------------------      Objective       Current Hospital Meds:  bisacodyl, 10 mg, Rectal, Daily  cefTRIAXone, 2 g, Intravenous, Q24H  chlorhexidine, 15 mL, Mouth/Throat, Q12H  enoxaparin, 40 mg, Subcutaneous, Q24H  folic acid, 1 mg, Oral, Daily  insulin aspart, 0-7 Units, Subcutaneous, Q6H  insulin detemir, 15 Units, Subcutaneous, Daily  multivitamin, 1 tablet, Per G Tube, Daily  pantoprazole, 40 mg, Intravenous, Q AM  polyethylene glycol, 17 g, Oral, Daily  sodium chloride, 10 mL, Intravenous, Q12H  sodium chloride, 10 mL, Intravenous, Q12H  thiamine, 100 mg, Oral, Daily  Vancomycin Pharmacy Intermittent Dosing, , Does not apply, Daily      Pharmacy Consult - Pharmacy to dose,   Pharmacy Consult - Pharmacy to dose,   propofol, 5-50 mcg/kg/min, Last Rate: Stopped (22)      ----------------------------------------------------------------------------------------------------------------------    Vital Signs:  Temp:  [97.3 °F (36.3 °C)-99.2 °F (37.3  °C)] 97.3 °F (36.3 °C)  Heart Rate:  [] 99  Resp:  [14-21] 16  BP: (135-178)/() 151/85  FiO2 (%):  [28 %] 28 %  Mean Arterial Pressure (Non-Invasive) for the past 24 hrs (Last 3 readings):   Noninvasive MAP (mmHg)   01/15/22 1033 110   01/15/22 1018 124   01/15/22 1003 122     SpO2 Percentage    01/15/22 1003 01/15/22 1018 01/15/22 1033   SpO2: 99% 99% 99%     SpO2:  [94 %-100 %] 99 %  on   ;   Device (Oxygen Therapy): ventilator    Body mass index is 21.72 kg/m².  Wt Readings from Last 3 Encounters:   01/15/22 78.8 kg (173 lb 12.8 oz)        Intake/Output Summary (Last 24 hours) at 1/15/2022 1042  Last data filed at 1/15/2022 0515  Gross per 24 hour   Intake 961 ml   Output 525 ml   Net 436 ml     NPO Diet  ----------------------------------------------------------------------------------------------------------------------      Physical Exam:    Constitutional: Chronically ill-appearing  male is intubated.  HENT:  Head: Normocephalic and atraumatic.  Mouth:  Moist mucous membranes.    Eyes:  Conjunctivae and EOM are normal.  No scleral icterus.  Neck:  Neck supple.  No JVD present.    Cardiovascular:  Normal rate, regular rhythm and normal heart sounds with no murmur. No edema.  Pulmonary/Chest: Intubated.  Lungs clear to auscultation bilaterally.  No crackles, no rhonchi, and no wheezes.  Abdominal:  Soft.  Bowel sounds are normal.  No distension and no tenderness.   Musculoskeletal:  No edema, no tenderness, and no deformity.  No swelling or redness of joints.  Neurological: Off sedation and moving eyes to command.  Skin:  Skin is warm and dry.  RLE erythema and warmth, but improving. Scattered ulcers and abrasions.   Psychiatric: Off sedation and moving eyes to command. Otherwise cannot assess.    ----------------------------------------------------------------------------------------------------------------------          Results from last 7 days   Lab Units 01/15/22  0423   PH, ARTERIAL  pH units 7.416   PO2 ART mm Hg 129.0*   PCO2, ARTERIAL mm Hg 26.0*   HCO3 ART mmol/L 16.7*     Results from last 7 days   Lab Units 01/15/22  0113 01/14/22  0657 01/12/22  2332 01/12/22  0020 01/10/22  2354   CRP mg/dL  --  5.30*  --   --  25.26*   WBC 10*3/mm3 11.68* 11.97* 11.26*   < > 12.88*   HEMOGLOBIN g/dL 7.8* 8.9* 8.6*   < > 9.3*   HEMATOCRIT % 24.4* 28.3* 27.2*   < > 31.0*   MCV fL 95.7 99.3* 97.5*   < > 103.3*   MCHC g/dL 32.0 31.4* 31.6   < > 30.0*   PLATELETS 10*3/mm3 274 258 224   < > 176    < > = values in this interval not displayed.     Results from last 7 days   Lab Units 01/15/22  0113 01/14/22  0657 01/13/22  1134 01/13/22  0456 01/12/22  2332   SODIUM mmol/L 141 144 146*   < > 143   POTASSIUM mmol/L 4.5 4.5 4.8   < > 4.4   MAGNESIUM mg/dL 2.8* 2.6  --   --  2.3   CHLORIDE mmol/L 112* 114* 116*   < > 113*   CO2 mmol/L 15.7* 13.4* 16.0*   < > 16.3*   BUN mg/dL 119* 105* 91*   < > 83*   CREATININE mg/dL 9.33* 8.30* 7.56*   < > 7.23*   EGFR IF NONAFRICN AM mL/min/1.73 6* 7* 7*   < > 8*   CALCIUM mg/dL 8.6 8.4* 8.0*   < > 7.7*   GLUCOSE mg/dL 241* 197* 193*   < > 200*   ALBUMIN g/dL  --   --   --   --  1.14*   BILIRUBIN mg/dL  --   --   --   --  0.3   ALK PHOS U/L  --   --   --   --  599*   AST (SGOT) U/L  --   --   --   --  48*   ALT (SGPT) U/L  --   --   --   --  23    < > = values in this interval not displayed.   Estimated Creatinine Clearance: 9.6 mL/min (A) (by C-G formula based on SCr of 9.33 mg/dL (H)).  No results found for: AMMONIA    Glucose   Date/Time Value Ref Range Status   01/15/2022 0517 246 (H) 70 - 130 mg/dL Final     Comment:     Meter: [id number] : [id number] BRENT ANTONIO   01/15/2022 0010 232 (H) 70 - 130 mg/dL Final     Comment:     Meter: [id number] : [id number] BRENT ANTONIO   01/14/2022 1751 268 (H) 70 - 130 mg/dL Final     Comment:     Meter: [id number] : [id number] KATHY OJEDA   01/14/2022 1134 231 (H) 70 - 130 mg/dL Final     Comment:     Meter:  [id number] : [id number] KATHY CARRIE R   01/14/2022 0605 213 (H) 70 - 130 mg/dL Final     Comment:     Meter: [id number] : [id number] MARITZA AGUIRRE   01/14/2022 0017 211 (H) 70 - 130 mg/dL Final     Comment:     Meter: [id number] : [id number] MARITZA AGUIRRE   01/13/2022 1748 198 (H) 70 - 130 mg/dL Final     Comment:     Meter: [id number] : [id number] KATHY CARRIE R   01/13/2022 1245 200 (H) 70 - 130 mg/dL Final     Comment:     Meter: [id number] : [id number] KATHY CARRIE R     Lab Results   Component Value Date    HGBA1C 7.60 (H) 01/04/2022     No results found for: TSH, FREET4    Blood Culture   Date Value Ref Range Status   01/07/2022 No growth at 24 hours  Preliminary   01/04/2022 Proteus mirabilis (C)  Final   01/04/2022 Streptococcus dysgalactiae ssp equisimilis (C)  Final   01/04/2022 Enterococcus faecalis (C)  Final     Comment:     Infectious disease consultation is highly recommended.   01/04/2022 Gram Negative Bacilli (C)  Final   01/04/2022 Streptococcus dysgalactiae ssp equisimilis (C)  Final   01/04/2022 Enterococcus faecalis (C)  Final     Comment:     Infectious disease consultation is highly recommended.   01/04/2022 Alcaligenes faecalis ssp faecalis (C)  Final   01/04/2022 Staphylococcus, coagulase negative (C)  Final     Comment:     Probable contaminant requires clinical correlation, susceptibility not performed unless requested by physician.       No results found for: URINECX  No results found for: WOUNDCX  No results found for: STOOLCX  No results found for: RESPCX  Pain Management Panel       Pain Management Panel Latest Ref Rng & Units 1/4/2022    AMPHETAMINES SCREEN, URINE Negative Negative    BARBITURATES SCREEN Negative Negative    BENZODIAZEPINE SCREEN, URINE Negative Negative    BUPRENORPHINEUR Negative Negative    COCAINE SCREEN, URINE Negative Negative    METHADONE SCREEN, URINE Negative Negative    METHAMPHETAMINEUR Negative Negative               ----------------------------------------------------------------------------------------------------------------------  Imaging Results (Last 24 Hours)       Procedure Component Value Units Date/Time    XR Chest 1 View [[id number]] Collected: 01/15/22 0934     Updated: 01/15/22 0937    Narrative:      XR CHEST 1 VW-     CLINICAL INDICATION: intubated; S00.83XA-Contusion of other part of  head, initial encounter; F10.231-Alcohol dependence with withdrawal  delirium        COMPARISON: 01/14/2022      TECHNIQUE: Single frontal view of the chest.     FINDINGS:     There is no focal alveolar infiltrate or effusion.  NG tube is been retracted. The tip is just proximal to the GE junction.  Other lines are stable  There is no evidence of an acute osseous abnormality.   There are no suspicious-appearing parenchymal soft tissue nodules.          Impression:      NG tube tip is been retracted. It is now just proximal to the GE  junction     This report was finalized on 1/15/2022 9:35 AM by Dr. Jose Armando Zhu MD.               ----------------------------------------------------------------------------------------------------------------------    Assessment/Plan       Assessment/Plan     ASSESSMENT:    1. Severe sepsis with lactic acid greater than 2 on admission  2. Bacteremia  3. Cellulitis right lower extremitiy    PLAN:    Patient remains intubated at 28% FiO2 today. Nurse and CNA at bedside. No new issues reported. Patient seems to be more responsive today and can blink his eyes by command. Afebrile, no diarrhea. WBC is stable at 11.68.  Blood cultures on 1/10/2022 finalized as no growth. Chest x-ray on 1/15/2022 shows there is no focal alveolar infiltrate or effusion.    Status post bronchoscopy on 1/10/2022.  Respiratory culture from 1/10/2022 finalized as no growth.    Blood cultures on 1/10/2022 are so far showing no growth.    Blood culture from 1/7/2022 finalized as Streptococcus dysgalactiae.   Blood cultures  from 1/4/2022 finalized as 2 out of 2 sets positive for Streptococcus dysgalactiae and Enterococcus faecalis.  1 out of 2 were noted with Alcaligenes faecalis, coagulase-negative Staphylococcus, and Proteus mirabilis.    MRSA PCR negative. COVID-19 and influenza PCR negative. Lactic 2.2 on admission. Urinalysis unremarkable for infection.CT of the abdomen and pelvis from 1/5/22 reports diffuse fatty change of the liver. CT Chest from 1/5/22 unremarkable. CT of the facial bones from 1/4/22 reports significant periapical lucency seen on the lower left and the mandible. CT of the head from 1/4/22 reports significant periapical lucency seen on the lower left and the mandible.    2D echo from 1/7/2022 reports no evidence of vegetation.    On 1/8/22 patient experienced aspiration event and rapid response was called. Patient became pulseless and CODE BLUE was called, but ROSC was obtained.  Patient was intubated.      Recommend to continue Rocephin and vancomycin for now. We will continue to follow closely and adjust antibiotic therapy as needed.    Code Status:   Code Status and Medical Interventions:   Ordered at: 01/10/22 1439     Medical Intervention Limits:    NO dialysis     Level Of Support Discussed With:    Next of Kin (If No Surrogate)     Code Status (Patient has no pulse and is not breathing):    CPR (Attempt to Resuscitate)     Medical Interventions (Patient has pulse or is breathing):    Limited Support       MEKHI Magana  01/15/22  10:42 EST

## 2022-01-15 NOTE — PROGRESS NOTES
Pharmacokinetics Service Note:     continues on day 11 of vancomycin for bacteremia.  A random vancomycin level was reported as 20.2 mg/L. Patient continues on intermittent dosing. Will not order a dose today and will check a random level in the morning to guide further dosing.    Thank you,  Gracy Sparks, PharmD  01/15/22  08:52 EST

## 2022-01-15 NOTE — PROGRESS NOTES
[facility] HOSPITALIST PROGRESS NOTE     Patient Identification:  Name:  Jose Armando Reich  Age:  56 y.o.  Sex:  male  :  1965  MRN:  [id number]  Visit Number:  [id number]  Primary Care Provider:  Provider, No Known    Length of stay:  10    Chief complaint: Intubated and sedated    Subjective:    Patient remains intubated and sedated.  Patient will open eyes to verbal stimulus and appears to be attempting to follow commands intermittently.  However, this is not reproducible with every attempt.  Did have lengthy conversation with patient's father via telephone regarding current status.  Did discuss his current neurologic exam as well as repeat lab work demonstrating worsening renal failure with worsening serum creatinine of 9.3.  Did discuss with patient's father it would be highly unlikely that patient's renal function would recover and that he would likely require hemodialysis in the near future.  Did discuss without hemodialysis patient will likely continue to decline and would eventually succumb to electrolyte abnormalities and/or pulmonary edema resulting in hypoxic respiratory failure which would ultimately threaten his life.  Patient's father continued to emphasize that the patient would not want to be placed on hemodialysis even if it meant saving his life.  Patient's father went on to say that the patient's quality of life over the past 1 year has been very poor.  Many other concerns were shared including but not limited to patient likely requiring inpatient rehab if he were to survive this hospital stay as well as alcohol abuse counseling and many other life changes that the patient would almost certainly not be in agreement to do.  With this in mind, after lengthy discussion, patient's father has elected to transition goals of care and to make patient comfort measures at this time.  Patient will be removed from the ventilator and comfort medications  provided.  ----------------------------------------------------------------------------------------------------------------------  Current Hospital Meds:  bisacodyl, 10 mg, Rectal, Daily  cefTRIAXone, 2 g, Intravenous, Q24H  chlorhexidine, 15 mL, Mouth/Throat, Q12H  enoxaparin, 30 mg, Subcutaneous, Q24H  folic acid, 1 mg, Oral, Daily  insulin aspart, 0-7 Units, Subcutaneous, Q6H  insulin detemir, 15 Units, Subcutaneous, Daily  multivitamin, 1 tablet, Per G Tube, Daily  pantoprazole, 40 mg, Intravenous, Q AM  polyethylene glycol, 17 g, Oral, Daily  sodium chloride, 10 mL, Intravenous, Q12H  sodium chloride, 10 mL, Intravenous, Q12H  thiamine, 100 mg, Oral, Daily  Vancomycin Pharmacy Intermittent Dosing, , Does not apply, Daily      Pharmacy Consult - Pharmacy to dose,   Pharmacy Consult - Pharmacy to dose,   propofol, 5-50 mcg/kg/min, Last Rate: Stopped (01/11/22 0923)      ----------------------------------------------------------------------------------------------------------------------  Vital Signs:  Temp:  [97.3 °F (36.3 °C)-99.1 °F (37.3 °C)] 97.3 °F (36.3 °C)  Heart Rate:  [] 93  Resp:  [14-21] 15  BP: (149-178)/() 151/85  FiO2 (%):  [28 %] 28 %      01/13/22  0500 01/14/22  0500 01/15/22  0500   Weight: 79.9 kg (176 lb 3.2 oz) 76.2 kg (168 lb) 78.8 kg (173 lb 12.8 oz)     Body mass index is 21.72 kg/m².    Intake/Output Summary (Last 24 hours) at 1/15/2022 1615  Last data filed at 1/15/2022 1526  Gross per 24 hour   Intake 961 ml   Output 650 ml   Net 311 ml     NPO Diet  ----------------------------------------------------------------------------------------------------------------------  Physical exam:  Constitutional: Acutely ill-appearing  male, currently intubated with no sedation, patient will intermittently open eyes and track to sound   HENT:  Head:  Normocephalic and atraumatic.  Mouth:  Moist mucous membranes.    Eyes:  Conjunctivae are normal.   No scleral icterus.    Neck:   Neck supple. No thyromegaly.  No JVD present.    Cardiovascular:  Regular rate and rhythm with no murmurs, rubs, clicks or gallops appreciated.  Pulmonary/Chest:  Clear to auscultation bilaterally with no crackles, wheezes or rhonchi appreciated.  Abdominal:  Soft. Nontender. Nondistended  Bowel sounds are normal in all four quadrants. No organomegally appreciated.   Musculoskeletal:  No edema, no tenderness, and no deformity.  No red or swollen joints anywhere.    Neurological: Intubated, will occasionally open eyes and intermittently attempt to follow  commands  Skin:  Warm and dry to palpation with no rashes or lesions appreciated.  Peripheral vascular:  2+ radial and pedal pulses in bilateral upper and lower extremities.    ------------------------------------------------------------------------------    ----------------------------------------------------------------------------------------------------------------------      Results from last 7 days   Lab Units 01/15/22  0113 01/14/22  0657 01/12/22  2332 01/12/22  0020 01/10/22  2354   CRP mg/dL  --  5.30*  --   --  25.26*   WBC 10*3/mm3 11.68* 11.97* 11.26*   < > 12.88*   HEMOGLOBIN g/dL 7.8* 8.9* 8.6*   < > 9.3*   HEMATOCRIT % 24.4* 28.3* 27.2*   < > 31.0*   MCV fL 95.7 99.3* 97.5*   < > 103.3*   MCHC g/dL 32.0 31.4* 31.6   < > 30.0*   PLATELETS 10*3/mm3 274 258 224   < > 176    < > = values in this interval not displayed.     Results from last 7 days   Lab Units 01/15/22  0423   PH, ARTERIAL pH units 7.416   PO2 ART mm Hg 129.0*   PCO2, ARTERIAL mm Hg 26.0*   HCO3 ART mmol/L 16.7*     Results from last 7 days   Lab Units 01/15/22  0113 01/14/22  0657 01/13/22  1134 01/13/22  0456 01/12/22  2332   SODIUM mmol/L 141 144 146*   < > 143   POTASSIUM mmol/L 4.5 4.5 4.8   < > 4.4   MAGNESIUM mg/dL 2.8* 2.6  --   --  2.3   CHLORIDE mmol/L 112* 114* 116*   < > 113*   CO2 mmol/L 15.7* 13.4* 16.0*   < > 16.3*   BUN mg/dL 119* 105* 91*   < > 83*   CREATININE mg/dL  9.33* 8.30* 7.56*   < > 7.23*   EGFR IF NONAFRICN AM mL/min/1.73 6* 7* 7*   < > 8*   CALCIUM mg/dL 8.6 8.4* 8.0*   < > 7.7*   GLUCOSE mg/dL 241* 197* 193*   < > 200*   ALBUMIN g/dL  --   --   --   --  1.14*   BILIRUBIN mg/dL  --   --   --   --  0.3   ALK PHOS U/L  --   --   --   --  599*   AST (SGOT) U/L  --   --   --   --  48*   ALT (SGPT) U/L  --   --   --   --  23    < > = values in this interval not displayed.   Estimated Creatinine Clearance: 9.6 mL/min (A) (by C-G formula based on SCr of 9.33 mg/dL (H)).    No results found for: AMMONIA      Blood Culture   Date Value Ref Range Status   01/07/2022 Streptococcus dysgalactiae ssp equisimilis (C)  Final   01/04/2022 Proteus mirabilis (C)  Final   01/04/2022 Streptococcus dysgalactiae ssp equisimilis (C)  Final   01/04/2022 Enterococcus faecalis (C)  Final     Comment:     Infectious disease consultation is highly recommended.   01/04/2022 Gram Negative Bacilli (C)  Final   01/04/2022 Streptococcus dysgalactiae ssp equisimilis (C)  Final   01/04/2022 Enterococcus faecalis (C)  Final     Comment:     Infectious disease consultation is highly recommended.   01/04/2022 Alcaligenes faecalis ssp faecalis (C)  Final   01/04/2022 Staphylococcus, coagulase negative (C)  Final     Comment:     Probable contaminant requires clinical correlation, susceptibility not performed unless requested by physician.       No results found for: URINECX  No results found for: WOUNDCX  No results found for: STOOLCX    I have personally looked at the labs and they are summarized above.  ----------------------------------------------------------------------------------------------------------------------  Imaging Results (Last 24 Hours)     Procedure Component Value Units Date/Time    XR Chest 1 View [[id number]] Collected: 01/15/22 0934     Updated: 01/15/22 0937    Narrative:      XR CHEST 1 VW-     CLINICAL INDICATION: intubated; S00.83XA-Contusion of other part of  head, initial  encounter; F10.231-Alcohol dependence with withdrawal  delirium        COMPARISON: 01/14/2022      TECHNIQUE: Single frontal view of the chest.     FINDINGS:     There is no focal alveolar infiltrate or effusion.  NG tube is been retracted. The tip is just proximal to the GE junction.  Other lines are stable  There is no evidence of an acute osseous abnormality.   There are no suspicious-appearing parenchymal soft tissue nodules.          Impression:      NG tube tip is been retracted. It is now just proximal to the GE  junction     This report was finalized on 1/15/2022 9:35 AM by Dr. Jose Armando Zhu MD.           ----------------------------------------------------------------------------------------------------------------------  Assessment and Plan:    1.  Acute hypoxic respiratory failure     2.  Alcohol withdrawal     3.  Aspiration pneumonia       4.  Severe sepsis     5.  Bacteremia     6.  Hypernatremia     7.  Acute kidney injury       8. Acute metabolic encephalopathy     9.  Comfort measures -after lengthy discussion with patient's father regarding all aspects of care, patient has now been made comfort measures.  We will discontinue all antibiotic therapy, IV fluids, labs and imaging.  Will provide medications for comfort only.  Patient is expected to decline rapidly and death would be expected in the near future.            Bahman Ma,   01/15/22  16:14 EST

## 2022-01-15 NOTE — PLAN OF CARE
Pt VSS and afebrile. No sedation currently on at this time. Pt does not follow commands, but is awake and appears to look at you & blink when talking to him. Adequate UOP for pt and 3 small liquid BM's this shift. IVAB's & insulin being given per SS. Bed low, locked & side rails up x3, Call light within reach, will cont to monitor.  Mariana Mendez RN    Problem: Adult Inpatient Plan of Care  Goal: Plan of Care Review  Outcome: Ongoing, Not Progressing  Flowsheets (Taken 1/14/2022 0429 by Jaylin Jovel, RN)  Progress: no change  Plan of Care Reviewed With: patient  Goal: Patient-Specific Goal (Individualized)  Outcome: Ongoing, Not Progressing  Goal: Absence of Hospital-Acquired Illness or Injury  Outcome: Ongoing, Not Progressing  Intervention: Prevent Skin Injury  Recent Flowsheet Documentation  Taken 1/15/2022 0800 by Mariana Mendez, RN  Skin Protection: tubing/devices free from skin contact  Goal: Optimal Comfort and Wellbeing  Outcome: Ongoing, Not Progressing  Intervention: Provide Person-Centered Care  Recent Flowsheet Documentation  Taken 1/15/2022 0800 by Mariana Mendez, RN  Trust Relationship/Rapport: care explained  Goal: Readiness for Transition of Care  Outcome: Ongoing, Not Progressing

## 2022-01-15 NOTE — NURSING NOTE
[id number] - Spoke to patients father Mr. Jose Armando Reich & he stated to me over the phone that he would like to make his son comfort measures at this time. Mr Reich states that he understands what that means & that he just wants his son to be as comfortable as possible.  Will message the Dr to verify this change in plan of care.    Mariana Mendez RN

## 2022-01-16 PROBLEM — F10.939 ALCOHOL WITHDRAWAL: Status: RESOLVED | Noted: 2022-01-01 | Resolved: 2022-01-01

## 2022-01-16 PROBLEM — S00.83XA CONTUSION OF FACE: Status: RESOLVED | Noted: 2022-01-01 | Resolved: 2022-01-01

## 2022-01-16 NOTE — DISCHARGE SUMMARY
[facility] HOSPITALIST MEDICINE DISCHARGE SUMMARY    Patient Identification:  Name:  Jose Armando Reich  Age:  56 y.o.  Sex:  male  :  1965  MRN:  [id number]  Visit Number:  [id number]    Date of Admission: 2022  Date of Discharge: 2022    PCP: Provider, No Known    DISCHARGE DIAGNOSIS   1. acute hypoxic respiratory failure  2.  Alcohol withdrawal  3.  Aspiration pneumonia  4.  Severe sepsis  5.  Bacteremia  6.  Hypernatremia  7.  Acute kidney injury  8.  Metabolic encephalopathy      CONSULTS  1. Dr. Gomez, Pulmonology  2. Dr. Chavez, ID  3. Dr. Solomon, Nephrology      PROCEDURES PERFORMED   1. Patient did have bronchoscopy performed 1/10/2022 secondary to extensive mucous plugging.  Please see procedure note for specific details.  Patient tolerated the procedure well with no postprocedural complications.      HOSPITAL COURSE  Mr. Reich was a 56 y.o. male who presented to [facility] ED on 2022 with a chief complaint of mechanical fall and alcohol withdrawal.  Patient had a past medical history remarkable for alcoholism.  It should be noted all history was obtained from H&P as patient was intubated and unable to give any history during my entire T of knowing him.  Per H&P, patient lived in his father's basement where the father reports the patient drank vodka all day long.  Patient apparently had a mechanical fall at approximately 6 AM on the date of admission while intoxicated and could not get up on his own.  EMS was called and patient was brought to the emergency department for further treatment and evaluation.  Please see H&P for specific details concerning history of presenting illness.  While in the emergency department, patient appeared to be in active alcohol withdrawal with tremors and tachycardia and confusion.  Patient was given both Valium and Ativan per psychiatry recommendations.  Withdrawal symptoms persisted and there is concern for further  deterioration and as such she was admitted to the intensive care unit.    Patient was started on CIWA protocol and alcohol withdrawal symptoms were addressed.  Patient was continued on CIWA protocol but unfortunately patient's condition continued to deteriorate.  He eventually had decreased respiratory rate with shallow breathing and rapid response was called on 2022.  Patient continued to deteriorate with worsening hypoxic respiratory failure and did become bradycardic with eventual PEA.  Patient did undergo a short code and did have successful ROSC and was intubated and placed on mechanical ventilation at that time.  Unfortunately, patient's condition continued to deteriorate throughout the remainder of his hospitalization.  Patient did develop acute kidney injury which continued to worsen throughout his hospital stay.  Patient became an uric and serum creatinine did rise above 9 the last time it was checked.  Patient was given over 72 hours of sedation vacation to allow patient to awaken so he could make medical decisions for himself.  Unfortunately, this never occurred.  Did have lengthy discussion with patient's father and his brother who both stated the patient would never want to live on hemodialysis.  Did discuss patient may be able to be placed on temporary hemodialysis where he may be successfully extubated and then start his long road to recovery.  After lengthy discussion, patient's father and brother stated the patient would never want to go through inpatient rehab or physical therapy/Occupational Therapy if he were to survive this event.  They further stressed the patient would never want to be placed on hemodialysis whatsoever.  As the patient's condition continued to decline with no promise of improvement from a renal standpoint, patient's family decided to make him comfort measures and did have him extubated on 1/15/2022.  Patient's condition continued to steadily decline until he did  on  2022 at 1225.    VITAL SIGNS:      22  0500 01/15/22  0500 22  0500   Weight: 76.2 kg (168 lb) 78.8 kg (173 lb 12.8 oz) 79.4 kg (175 lb)     Body mass index is 21.87 kg/m².    PHYSICAL EXAM:  General -patient does not respond to verbal or physical stimuli  Cardiovascular -no heartbeat on auscultation, no carotid artery pulse on palpation  Lungs -no spontaneous chest rise, absent breath sounds on auscultation  Neurological -pupils fixed and dilated, unresponsive to both verbal and physical stimuli    DISCHARGE DISPOSITION              Bahman Ma, DO  22  14:13 EST    Please note that this discharge summary required more than 30 minutes to complete.    Please send a copy of this dictation to the following providers:  Provider, No Known

## 2022-01-16 NOTE — PLAN OF CARE
Goal Outcome Evaluation:  Plan of Care Reviewed With: patient        Progress: no change  Outcome Summary: Pt appears to be resting comfortably, has received ativan throughout shift, UOP 450cc, normal sinus on monitor.

## 2022-01-16 NOTE — PROGRESS NOTES
Patient has been transitioned to comfort care. ID will sign off at this time. Please call for any questions. Thank you.    Diana Harding PA-C  1028 EST  1/16/2022

## 2022-01-17 NOTE — PAYOR COMM NOTE
"[facility]  KAREL CHAMBERS  PHONE  [phone]  FAX  [phone]  NPI:  [id number]    PATIENT D/C 2022      Cleo Archer (Dcsd. Male)             Date of Birth Social Security Number Address Home Phone MRN    1965  [address] [address] [phone] [id number]    Mandaeism Marital Status             None Single       Admission Date Admission Type Admitting Provider Attending Provider Department, Room/Bed    22 Emergency Alberto Angeles MD  [facility] CRITICAL CARE, C219/1C    Discharge Date Discharge Disposition Discharge Destination          2022               Attending Provider: (none)   Allergies: No Known Allergies    Isolation: None   Infection: None   Code Status: Prior   Advance Care Planning Activity    Ht: 190.5 cm (75\")   Wt: 79.4 kg (175 lb)    Admission Cmt: None   Principal Problem: None                Active Insurance as of 2022     Primary Coverage     Payor Plan Insurance Group Employer/Plan Group    [facility] BY GM [facility] BY GM [id number]     Payor Plan Address Payor Plan Phone Number Payor Plan Fax Number Effective Dates    [address]   2021 - None Entered    [facility] [address]       Subscriber Name Subscriber Birth Date Member ID       ARCHER,CLEO TOWNSEND 1965 [id number]                 Emergency Contacts      (Rel.) Home Phone Work Phone Mobile Phone    ARCHERCLEO (Father) [phone] -- --    Virgil Archer (Brother) [phone] -- --              "

## 2022-03-08 NOTE — PLAN OF CARE
Goal Outcome Evaluation:  Plan of Care Reviewed With: patient        Progress: no change  Outcome Summary: VSS and afebrile. No sedation currently on at this time. Pt does not follow commands at thist time, but is awake and appears to look at you when talking to him. Adequate UOP for pt and 1 BM this shift. Call light within reach.   Tranexamic Acid Counseling:  Patient advised of the small risk of bleeding problems with tranexamic acid. They were also instructed to call if they developed any nausea, vomiting or diarrhea. All of the patient's questions and concerns were addressed.